# Patient Record
Sex: MALE | Race: WHITE | Employment: OTHER | URBAN - METROPOLITAN AREA
[De-identification: names, ages, dates, MRNs, and addresses within clinical notes are randomized per-mention and may not be internally consistent; named-entity substitution may affect disease eponyms.]

---

## 2023-02-24 ENCOUNTER — APPOINTMENT (OUTPATIENT)
Dept: GENERAL RADIOLOGY | Age: 83
DRG: 057 | End: 2023-02-24
Payer: MEDICARE

## 2023-02-24 ENCOUNTER — HOSPITAL ENCOUNTER (INPATIENT)
Age: 83
LOS: 1 days | Discharge: HOME | DRG: 057 | End: 2023-02-25
Attending: STUDENT IN AN ORGANIZED HEALTH CARE EDUCATION/TRAINING PROGRAM | Admitting: PSYCHIATRY & NEUROLOGY
Payer: MEDICARE

## 2023-02-24 ENCOUNTER — APPOINTMENT (OUTPATIENT)
Dept: CT IMAGING | Age: 83
DRG: 057 | End: 2023-02-24
Payer: MEDICARE

## 2023-02-24 DIAGNOSIS — F02.818 ALZHEIMER'S DEMENTIA WITH OTHER BEHAVIORAL DISTURBANCE, UNSPECIFIED DEMENTIA SEVERITY, UNSPECIFIED TIMING OF DEMENTIA ONSET (HCC): Primary | ICD-10-CM

## 2023-02-24 DIAGNOSIS — G30.9 ALZHEIMER'S DEMENTIA WITH OTHER BEHAVIORAL DISTURBANCE, UNSPECIFIED DEMENTIA SEVERITY, UNSPECIFIED TIMING OF DEMENTIA ONSET (HCC): Primary | ICD-10-CM

## 2023-02-24 LAB
A/G RATIO: 1.6 (ref 1.1–2.2)
ACETAMINOPHEN LEVEL: <5 UG/ML (ref 10–30)
ALBUMIN SERPL-MCNC: 3.9 G/DL (ref 3.4–5)
ALP BLD-CCNC: 69 U/L (ref 40–129)
ALT SERPL-CCNC: 19 U/L (ref 10–40)
ANION GAP SERPL CALCULATED.3IONS-SCNC: 8 MMOL/L (ref 3–16)
AST SERPL-CCNC: 21 U/L (ref 15–37)
BACTERIA: ABNORMAL /HPF
BASOPHILS ABSOLUTE: 0 K/UL (ref 0–0.2)
BASOPHILS RELATIVE PERCENT: 0.4 %
BILIRUB SERPL-MCNC: 0.4 MG/DL (ref 0–1)
BILIRUBIN URINE: NEGATIVE
BLOOD, URINE: ABNORMAL
BUN BLDV-MCNC: 19 MG/DL (ref 7–20)
CALCIUM SERPL-MCNC: 9.1 MG/DL (ref 8.3–10.6)
CHLORIDE BLD-SCNC: 103 MMOL/L (ref 99–110)
CLARITY: CLEAR
CO2: 30 MMOL/L (ref 21–32)
COLOR: YELLOW
CREAT SERPL-MCNC: 1 MG/DL (ref 0.8–1.3)
EOSINOPHILS ABSOLUTE: 0 K/UL (ref 0–0.6)
EOSINOPHILS RELATIVE PERCENT: 0.7 %
ETHANOL: NORMAL MG/DL (ref 0–0.08)
GFR SERPL CREATININE-BSD FRML MDRD: >60 ML/MIN/{1.73_M2}
GLUCOSE BLD-MCNC: 192 MG/DL (ref 70–99)
GLUCOSE BLD-MCNC: 62 MG/DL (ref 70–99)
GLUCOSE URINE: NEGATIVE MG/DL
HCT VFR BLD CALC: 39.3 % (ref 40.5–52.5)
HEMOGLOBIN: 13.3 G/DL (ref 13.5–17.5)
INFLUENZA A: NOT DETECTED
INFLUENZA B: NOT DETECTED
KETONES, URINE: NEGATIVE MG/DL
LEUKOCYTE ESTERASE, URINE: NEGATIVE
LYMPHOCYTES ABSOLUTE: 1 K/UL (ref 1–5.1)
LYMPHOCYTES RELATIVE PERCENT: 18.7 %
MAGNESIUM: 2.1 MG/DL (ref 1.8–2.4)
MCH RBC QN AUTO: 30.7 PG (ref 26–34)
MCHC RBC AUTO-ENTMCNC: 33.9 G/DL (ref 31–36)
MCV RBC AUTO: 90.6 FL (ref 80–100)
MICROSCOPIC EXAMINATION: YES
MONOCYTES ABSOLUTE: 0.5 K/UL (ref 0–1.3)
MONOCYTES RELATIVE PERCENT: 9.2 %
NEUTROPHILS ABSOLUTE: 3.9 K/UL (ref 1.7–7.7)
NEUTROPHILS RELATIVE PERCENT: 71 %
NITRITE, URINE: NEGATIVE
PDW BLD-RTO: 14.3 % (ref 12.4–15.4)
PERFORMED ON: ABNORMAL
PH UA: 6 (ref 5–8)
PLATELET # BLD: 81 K/UL (ref 135–450)
PLATELET SLIDE REVIEW: ABNORMAL
PMV BLD AUTO: 7.1 FL (ref 5–10.5)
POTASSIUM REFLEX MAGNESIUM: 3.2 MMOL/L (ref 3.5–5.1)
PROTEIN UA: ABNORMAL MG/DL
RBC # BLD: 4.34 M/UL (ref 4.2–5.9)
RBC UA: ABNORMAL /HPF (ref 0–4)
SALICYLATE, SERUM: <0.3 MG/DL (ref 15–30)
SARS-COV-2 RNA, RT PCR: NOT DETECTED
SLIDE REVIEW: ABNORMAL
SODIUM BLD-SCNC: 141 MMOL/L (ref 136–145)
SPECIFIC GRAVITY UA: 1.01 (ref 1–1.03)
TOTAL PROTEIN: 6.4 G/DL (ref 6.4–8.2)
TROPONIN: 0.11 NG/ML
TROPONIN: 0.13 NG/ML
URINE TYPE: ABNORMAL
UROBILINOGEN, URINE: 0.2 E.U./DL
WBC # BLD: 5.5 K/UL (ref 4–11)
WBC UA: ABNORMAL /HPF (ref 0–5)

## 2023-02-24 PROCEDURE — 2580000003 HC RX 258: Performed by: STUDENT IN AN ORGANIZED HEALTH CARE EDUCATION/TRAINING PROGRAM

## 2023-02-24 PROCEDURE — 93005 ELECTROCARDIOGRAM TRACING: CPT | Performed by: STUDENT IN AN ORGANIZED HEALTH CARE EDUCATION/TRAINING PROGRAM

## 2023-02-24 PROCEDURE — 85025 COMPLETE CBC W/AUTO DIFF WBC: CPT

## 2023-02-24 PROCEDURE — 80143 DRUG ASSAY ACETAMINOPHEN: CPT

## 2023-02-24 PROCEDURE — 80053 COMPREHEN METABOLIC PANEL: CPT

## 2023-02-24 PROCEDURE — 2580000003 HC RX 258

## 2023-02-24 PROCEDURE — 84443 ASSAY THYROID STIM HORMONE: CPT

## 2023-02-24 PROCEDURE — 36415 COLL VENOUS BLD VENIPUNCTURE: CPT

## 2023-02-24 PROCEDURE — 84484 ASSAY OF TROPONIN QUANT: CPT

## 2023-02-24 PROCEDURE — 82077 ASSAY SPEC XCP UR&BREATH IA: CPT

## 2023-02-24 PROCEDURE — 99285 EMERGENCY DEPT VISIT HI MDM: CPT

## 2023-02-24 PROCEDURE — 6360000002 HC RX W HCPCS: Performed by: STUDENT IN AN ORGANIZED HEALTH CARE EDUCATION/TRAINING PROGRAM

## 2023-02-24 PROCEDURE — 83735 ASSAY OF MAGNESIUM: CPT

## 2023-02-24 PROCEDURE — 81001 URINALYSIS AUTO W/SCOPE: CPT

## 2023-02-24 PROCEDURE — 80179 DRUG ASSAY SALICYLATE: CPT

## 2023-02-24 PROCEDURE — 71045 X-RAY EXAM CHEST 1 VIEW: CPT

## 2023-02-24 PROCEDURE — 87636 SARSCOV2 & INF A&B AMP PRB: CPT

## 2023-02-24 PROCEDURE — 96372 THER/PROPH/DIAG INJ SC/IM: CPT

## 2023-02-24 PROCEDURE — 70450 CT HEAD/BRAIN W/O DYE: CPT

## 2023-02-24 RX ORDER — LANOLIN ALCOHOL/MO/W.PET/CERES
CREAM (GRAM) TOPICAL
Status: ON HOLD | COMMUNITY
End: 2023-02-25

## 2023-02-24 RX ORDER — AMLODIPINE BESYLATE 2.5 MG/1
TABLET ORAL
Status: ON HOLD | COMMUNITY
End: 2023-03-02 | Stop reason: SDUPTHER

## 2023-02-24 RX ORDER — ASPIRIN 81 MG/1
TABLET ORAL
Status: ON HOLD | COMMUNITY
End: 2023-02-25

## 2023-02-24 RX ORDER — GLIMEPIRIDE 1 MG/1
TABLET ORAL
Status: ON HOLD | COMMUNITY
Start: 2023-01-06 | End: 2023-03-08 | Stop reason: HOSPADM

## 2023-02-24 RX ORDER — ZIPRASIDONE MESYLATE 20 MG/ML
20 INJECTION, POWDER, LYOPHILIZED, FOR SOLUTION INTRAMUSCULAR ONCE
Status: COMPLETED | OUTPATIENT
Start: 2023-02-24 | End: 2023-02-24

## 2023-02-24 RX ORDER — LORAZEPAM 2 MG/ML
2 INJECTION INTRAMUSCULAR ONCE
Status: COMPLETED | OUTPATIENT
Start: 2023-02-24 | End: 2023-02-24

## 2023-02-24 RX ORDER — DIVALPROEX SODIUM 500 MG/1
TABLET, EXTENDED RELEASE ORAL
Status: ON HOLD | COMMUNITY
End: 2023-03-02 | Stop reason: HOSPADM

## 2023-02-24 RX ORDER — LOSARTAN POTASSIUM 100 MG/1
TABLET ORAL
Status: ON HOLD | COMMUNITY
End: 2023-03-02 | Stop reason: SDUPTHER

## 2023-02-24 RX ORDER — ASCORBIC ACID 1000 MG
120 TABLET ORAL
Status: ON HOLD | COMMUNITY
End: 2023-02-25

## 2023-02-24 RX ORDER — VITAMIN E 268 MG
CAPSULE ORAL
Status: ON HOLD | COMMUNITY
End: 2023-02-25

## 2023-02-24 RX ORDER — WATER 1000 ML/1000ML
INJECTION, SOLUTION INTRAVENOUS
Status: COMPLETED
Start: 2023-02-24 | End: 2023-02-24

## 2023-02-24 RX ORDER — DONEPEZIL HYDROCHLORIDE 10 MG/1
TABLET, FILM COATED ORAL
Status: ON HOLD | COMMUNITY
End: 2023-03-02 | Stop reason: HOSPADM

## 2023-02-24 RX ORDER — ATORVASTATIN CALCIUM 40 MG/1
TABLET, FILM COATED ORAL
Status: ON HOLD | COMMUNITY
End: 2023-03-08 | Stop reason: HOSPADM

## 2023-02-24 RX ORDER — DEXTROSE MONOHYDRATE 25 G/50ML
25 INJECTION, SOLUTION INTRAVENOUS ONCE
Status: DISCONTINUED | OUTPATIENT
Start: 2023-02-24 | End: 2023-02-24 | Stop reason: CLARIF

## 2023-02-24 RX ADMIN — WATER 10 ML: 1 INJECTION INTRAMUSCULAR; INTRAVENOUS; SUBCUTANEOUS at 18:45

## 2023-02-24 RX ADMIN — ZIPRASIDONE MESYLATE 20 MG: 20 INJECTION, POWDER, LYOPHILIZED, FOR SOLUTION INTRAMUSCULAR at 18:44

## 2023-02-24 RX ADMIN — LORAZEPAM 2 MG: 2 INJECTION INTRAMUSCULAR; INTRAVENOUS at 18:44

## 2023-02-24 RX ADMIN — DEXTROSE MONOHYDRATE 250 ML: 100 INJECTION, SOLUTION INTRAVENOUS at 20:41

## 2023-02-24 NOTE — ED PROVIDER NOTES
[address] ED     EMERGENCY DEPARTMENT ENCOUNTER            Pt Name: Halina Dodge   MRN: [id number]   [place] 1940   Date of evaluation: 2/24/2023   Provider: Abby Guzman MD   PCP: Vicente Renee   Note Started: 5:31 PM EST 2/24/23          CHIEF COMPLAINT     Chief Complaint   Patient presents with    Altered Mental Status     Pt from home with a history of Alzheimer's. Per EMS, pt has been wandering from home for miles in the last few days and needing to be searched out. HISTORY OF PRESENT ILLNESS:   History from : EMS   Limitations to history : Altered Mental Status     Halina Dodge is a [de-identified] y.o. male who presents with concerns for altered mental status. Patient presents from home, does have a history of Alzheimer's. Apparently has been leaving the house the past few days. Upon arrival in the ED, patient is unable to contribute to the history and and is oriented only to self. Per EMS, patient's family members are hoping to have them placed. Nursing Notes were all reviewed and agreed with, or any disagreements were addressed in the HPI. REVIEW OF SYSTEMS :    Positives and Pertinent negatives as per HPI. MEDICAL HISTORY   has no past medical history on file. No past surgical history on file. CURRENTMEDICATIONS       Previous Medications    AMLODIPINE (NORVASC) 2.5 MG TABLET    amlodipine 2.5 mg tablet   TAKE 1 TABLET BY MOUTH EVERY DAY AT BEDTIME    ASPIRIN 81 MG EC TABLET    aspirin 81 mg tablet,delayed release   Take 1 tablet every day by oral route.     ATORVASTATIN (LIPITOR) 40 MG TABLET    atorvastatin 40 mg tablet   TAKE 1 TABLET BY MOUTH EVERY DAY AT BEDTIME    DIVALPROEX (DEPAKOTE ER) 500 MG EXTENDED RELEASE TABLET    divalproex  mg tablet,extended release 24 hr   TAKE 1 TABLET BY MOUTH ONCE DAILY FOR 2 WEEKS THEN INCREASE TO 1 TABLET TWICE DAILY    DONEPEZIL (ARICEPT) 10 MG TABLET    donepezil 10 mg tablet    GINKGO BILOBA 40 MG TABS    Take 120 mg by mouth    GLIMEPIRIDE (AMARYL) 1 MG TABLET    TAKE 1 TABLET BY MOUTH EVERY DAY    LOSARTAN (COZAAR) 100 MG TABLET    losartan 100 mg tablet   TAKE 1 TABLET BY MOUTH DAILY    MELATONIN 3 MG TABS TABLET    melatonin 3 mg tablet   Take 4 tablets every day by oral route at bedtime. MULTIPLE VITAMINS-MINERALS (CENTRUM SILVER 50+MEN PO)    Take by mouth    VITAMIN B-12 (CYANOCOBALAMIN) 1000 MCG TABLET    Vitamin B12   1 po daily    VITAMIN E 180 MG (400 UNIT) CAPS CAPSULE    vitamin E   1 po daily      SCREENINGS          Ragan Coma Scale  Eye Opening: Spontaneous  Best Verbal Response: Confused  Best Motor Response: Obeys commands  Ragan Coma Scale Score: 14                CIWA Assessment  BP: 122/65  Heart Rate: 66                  PHYSICAL EXAM :  ED Triage Vitals   BP Temp Temp src Pulse Resp SpO2 Height Weight   -- -- -- -- -- -- -- --      GENERAL APPEARANCE: Awake and alert. Cooperative. No acute distress. HEAD: Normocephalic. Atraumatic. EYES: PERRL. EOM's grossly intact. ENT: Mucous membranes are moist.   NECK: Supple, trachea midline. HEART: RRR. Normal S1, S2. No murmurs, rubs or gallops. LUNGS: Respirations unlabored. CTAB. Good air exchange. No wheezes, rales, or rhonchi. ABDOMEN: Soft. Non-distended. Non-tender. No guarding or rebound. Normal Bowel sounds. EXTREMITIES: No peripheral edema. MAEE. No acute deformities. SKIN: Warm and dry. No acute rashes. NEUROLOGICAL: Alert and oriented X 1. Doing all extremities spontaneously and attempting to get out of bed.   PSYCHIATRIC: Demented    DIAGNOSTIC RESULTS     LABS:   Labs Reviewed   CBC WITH AUTO DIFFERENTIAL - Abnormal; Notable for the following components:       Result Value    Hemoglobin 13.3 (*)     Hematocrit 39.3 (*)     Platelets 81 (*)     All other components within normal limits   COMPREHENSIVE METABOLIC PANEL W/ REFLEX TO MG FOR LOW K - Abnormal; Notable for the following components:    Potassium reflex Magnesium 3.2 (*)     Glucose 62 (*)     All other components within normal limits   TROPONIN - Abnormal; Notable for the following components:    Troponin 0.13 (*)     All other components within normal limits   URINALYSIS - Abnormal; Notable for the following components:    Blood, Urine TRACE-INTACT (*)     Protein, UA TRACE (*)     All other components within normal limits   ACETAMINOPHEN LEVEL - Abnormal; Notable for the following components:    Acetaminophen Level <5 (*)     All other components within normal limits   SALICYLATE LEVEL - Abnormal; Notable for the following components:    Salicylate, Serum <9.3 (*)     All other components within normal limits   MICROSCOPIC URINALYSIS - Abnormal; Notable for the following components:    Bacteria, UA Rare (*)     All other components within normal limits   TROPONIN - Abnormal; Notable for the following components:    Troponin 0.11 (*)     All other components within normal limits   POCT GLUCOSE - Abnormal; Notable for the following components:    POC Glucose 192 (*)     All other components within normal limits   COVID-19 & INFLUENZA COMBO   ETHANOL   MAGNESIUM      When ordered only abnormal lab results are displayed. All other labs were within normal range or not returned as of this dictation. RADIOLOGY:      Non-plain film images such as CT, Ultrasound and MRI are read by the radiologist. Plain radiographic images are visualized and preliminarily interpreted by the ED Provider with the below findings:   Interpretation per the Radiologist below, if available at the time of this note:     CT HEAD WO CONTRAST   Final Result   Chronic findings in the brain without acute CT abnormality identified. XR CHEST PORTABLE   Final Result   Mild bibasilar discoid atelectasis or scarring with no acute infiltrate or   effusion            No results found. EKG:   The Ekg interpreted by me shows  normal sinus rhythm with a rate of 91  Axis is   Normal  QTc is  within an acceptable range  Intervals and Durations are unremarkable. ST Segments: nonspecific changes  No previous available for comparison    PROCEDURES   Unless otherwise noted below, none     CRITICAL CARE TIME   0         Vitals:    Vitals:    02/24/23 1804 02/24/23 1929 02/24/23 2002 02/24/23 2202   BP: (!) 166/83 (!) 129/97 127/67 122/65   Pulse: 70 96 76 66   Resp: 18 13 20 20   Temp: 97.1 °F (36.2 °C)      TempSrc: Oral      SpO2: 100% 100% 100% 100%             Is this patient to be included in the SEP-1 Core Measure due to severe sepsis or septic shock? No   Exclusion criteria - the patient is NOT to be included for SEP-1 Core Measure due to: Infection is not suspected       CC/HPI Summary, DDx, ED Course, and Reassessment: Patient is an 82-year-old male, with history of Alzheimer's, presenting with home care with concerns for worsening altered mental status. He is alert and oriented only to self. He is moving all extremities spontaneously. Upon arrival in the ED, vitals are reassuring. Patient is resting comfortably and is in no acute distress but was attempting to get out of bed and for this reason was placed in a Posey belt for his own safety. He became increasingly more agitated and was treated with Geodon and IM Ativan with improvement of his agitation. Labs were performed and are overall reassuring. No acute concerning electrolyte abnormalities. His troponin was initially elevated at 0.13. I have no previous for comparison and repeat was found to be downtrending at 0.11. They with no evidence of infection. CT head negative for acute concerning findings. Patient is medically cleared for behavioral health assessment. Will be signed out to the oncoming physician pending their evaluation and recommendations.      Patient was given the following medications:   Medications   dextrose bolus 10% 125 mL ( IntraVENous See Alternative 2/24/23 2041)     Or   dextrose bolus 10% 250 mL (250 mLs IntraVENous New Bag 2/24/23 2041)   dextrose bolus 10% 250 mL ( IntraVENous Canceled Entry 2/24/23 2225)   ziprasidone (GEODON) injection 20 mg (20 mg IntraMUSCular Given 2/24/23 1844)   LORazepam (ATIVAN) injection 2 mg (2 mg IntraMUSCular Given 2/24/23 1844)   sterile water injection (10 mLs  Given 2/24/23 1845)        CONSULTS:   IP CONSULT TO PSYCHIATRY   Discussion with Other Professionals: None   {Social Determinants: None  Chronic Conditions: Alzheimer's dementia  Records Reviewed: NA      Disposition Considerations: Will be signed out to oncoming physician pending behavioral health evaluation and recommendations. I am the Primary Clinician of Record. FINAL IMPRESSION    1. Alzheimer's dementia with other behavioral disturbance, unspecified dementia severity, unspecified timing of dementia onset ([facility] [address]           DISPOSITION/PLAN     PATIENT REFERRED TO:   No follow-up provider specified.      DISCHARGE MEDICATIONS:   New Prescriptions    No medications on file        DISCONTINUED MEDICATIONS:   Discontinued Medications    No medications on file              (Please note that portions of this note were completed with a voice recognition program.  Efforts were made to edit the dictations but occasionally words are mis-transcribed.)       Hermann Geronimo MD (electronically signed)            Hermann Geronimo MD  02/24/23 3888
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
## 2023-02-24 NOTE — ED NOTES
Pt uncooperative not allowing staff to touch him; could not obtain EKG or blood work at this time; provider notified           Elijah Diane RN  02/24/23 6012

## 2023-02-25 ENCOUNTER — APPOINTMENT (OUTPATIENT)
Dept: GENERAL RADIOLOGY | Age: 83
End: 2023-02-25
Attending: INTERNAL MEDICINE
Payer: MEDICARE

## 2023-02-25 ENCOUNTER — HOSPITAL ENCOUNTER (INPATIENT)
Age: 83
LOS: 6 days | Discharge: NURSING, REHAB OR LONG-TERM CARE | End: 2023-03-03
Attending: INTERNAL MEDICINE | Admitting: INTERNAL MEDICINE
Payer: MEDICARE

## 2023-02-25 ENCOUNTER — APPOINTMENT (OUTPATIENT)
Dept: CT IMAGING | Age: 83
DRG: 057 | End: 2023-02-25
Payer: MEDICARE

## 2023-02-25 VITALS
RESPIRATION RATE: 18 BRPM | SYSTOLIC BLOOD PRESSURE: 132 MMHG | OXYGEN SATURATION: 97 % | HEART RATE: 90 BPM | TEMPERATURE: 98.7 F | DIASTOLIC BLOOD PRESSURE: 80 MMHG

## 2023-02-25 PROBLEM — I10 PRIMARY HYPERTENSION: Status: ACTIVE | Noted: 2023-02-25

## 2023-02-25 PROBLEM — F03.918 DEMENTIA WITH BEHAVIORAL DISTURBANCE: Status: ACTIVE | Noted: 2023-02-25

## 2023-02-25 PROBLEM — R41.82 ALTERED MENTAL STATUS: Status: ACTIVE | Noted: 2023-02-25

## 2023-02-25 PROBLEM — J96.01 ACUTE RESPIRATORY FAILURE WITH HYPOXIA (HCC): Status: ACTIVE | Noted: 2023-02-25

## 2023-02-25 PROBLEM — F02.80 ALZHEIMER'S DEMENTIA (HCC): Status: ACTIVE | Noted: 2023-02-25

## 2023-02-25 PROBLEM — T17.908A ASPIRATION INTO AIRWAY: Status: ACTIVE | Noted: 2023-02-25

## 2023-02-25 PROBLEM — E11.9 TYPE 2 DIABETES MELLITUS WITHOUT COMPLICATION, WITHOUT LONG-TERM CURRENT USE OF INSULIN (HCC): Status: ACTIVE | Noted: 2023-02-25

## 2023-02-25 PROBLEM — G30.9 ALZHEIMER'S DEMENTIA (HCC): Status: ACTIVE | Noted: 2023-02-25

## 2023-02-25 LAB
ANION GAP SERPL CALCULATED.3IONS-SCNC: 14 MMOL/L (ref 3–16)
BASE EXCESS ARTERIAL: 3.7 MMOL/L (ref -3–3)
BASOPHILS ABSOLUTE: 0 K/UL (ref 0–0.2)
BASOPHILS RELATIVE PERCENT: 0.1 %
BUN BLDV-MCNC: 23 MG/DL (ref 7–20)
CALCIUM SERPL-MCNC: 9.1 MG/DL (ref 8.3–10.6)
CARBOXYHEMOGLOBIN ARTERIAL: 1 % (ref 0–1.5)
CHLORIDE BLD-SCNC: 103 MMOL/L (ref 99–110)
CO2: 26 MMOL/L (ref 21–32)
CREAT SERPL-MCNC: 1.4 MG/DL (ref 0.8–1.3)
EKG ATRIAL RATE: 91 BPM
EKG DIAGNOSIS: NORMAL
EKG P AXIS: 62 DEGREES
EKG P-R INTERVAL: 138 MS
EKG Q-T INTERVAL: 370 MS
EKG QRS DURATION: 82 MS
EKG QTC CALCULATION (BAZETT): 455 MS
EKG R AXIS: 46 DEGREES
EKG T AXIS: -28 DEGREES
EKG VENTRICULAR RATE: 91 BPM
EOSINOPHILS ABSOLUTE: 0 K/UL (ref 0–0.6)
EOSINOPHILS RELATIVE PERCENT: 0 %
GFR SERPL CREATININE-BSD FRML MDRD: 50 ML/MIN/{1.73_M2}
GLUCOSE BLD-MCNC: 111 MG/DL (ref 70–99)
GLUCOSE BLD-MCNC: 130 MG/DL (ref 70–99)
HCO3 ARTERIAL: 29.3 MMOL/L (ref 21–29)
HCT VFR BLD CALC: 47.1 % (ref 40.5–52.5)
HEMOGLOBIN, ART, EXTENDED: 16.3 G/DL (ref 13.5–17.5)
HEMOGLOBIN: 15.5 G/DL (ref 13.5–17.5)
LACTIC ACID: 2.6 MMOL/L (ref 0.4–2)
LYMPHOCYTES ABSOLUTE: 0.4 K/UL (ref 1–5.1)
LYMPHOCYTES RELATIVE PERCENT: 3.4 %
MCH RBC QN AUTO: 30.8 PG (ref 26–34)
MCHC RBC AUTO-ENTMCNC: 32.9 G/DL (ref 31–36)
MCV RBC AUTO: 93.7 FL (ref 80–100)
METHEMOGLOBIN ARTERIAL: 0 %
MONOCYTES ABSOLUTE: 1.3 K/UL (ref 0–1.3)
MONOCYTES RELATIVE PERCENT: 10.6 %
NEUTROPHILS ABSOLUTE: 10.7 K/UL (ref 1.7–7.7)
NEUTROPHILS RELATIVE PERCENT: 85.9 %
O2 SAT, ARTERIAL: 54.8 %
O2 THERAPY: ABNORMAL
PCO2 ARTERIAL: 47.5 MMHG (ref 35–45)
PDW BLD-RTO: 14.8 % (ref 12.4–15.4)
PERFORMED ON: ABNORMAL
PH ARTERIAL: 7.41 (ref 7.35–7.45)
PLATELET # BLD: 80 K/UL (ref 135–450)
PMV BLD AUTO: 7.5 FL (ref 5–10.5)
PO2 ARTERIAL: 29 MMHG (ref 75–108)
POTASSIUM REFLEX MAGNESIUM: 3.8 MMOL/L (ref 3.5–5.1)
PROCALCITONIN: 1.58 NG/ML (ref 0–0.15)
RBC # BLD: 5.02 M/UL (ref 4.2–5.9)
SODIUM BLD-SCNC: 143 MMOL/L (ref 136–145)
TCO2 ARTERIAL: 30.8 MMOL/L
TROPONIN: 0.09 NG/ML
TSH SERPL DL<=0.05 MIU/L-ACNC: 3.38 UIU/ML (ref 0.27–4.2)
WBC # BLD: 12.5 K/UL (ref 4–11)

## 2023-02-25 PROCEDURE — 99223 1ST HOSP IP/OBS HIGH 75: CPT | Performed by: PSYCHIATRY & NEUROLOGY

## 2023-02-25 PROCEDURE — 6360000004 HC RX CONTRAST MEDICATION

## 2023-02-25 PROCEDURE — 2060000000 HC ICU INTERMEDIATE R&B

## 2023-02-25 PROCEDURE — 71045 X-RAY EXAM CHEST 1 VIEW: CPT

## 2023-02-25 PROCEDURE — 93010 ELECTROCARDIOGRAM REPORT: CPT | Performed by: INTERNAL MEDICINE

## 2023-02-25 PROCEDURE — 85025 COMPLETE CBC W/AUTO DIFF WBC: CPT

## 2023-02-25 PROCEDURE — 82803 BLOOD GASES ANY COMBINATION: CPT

## 2023-02-25 PROCEDURE — 80048 BASIC METABOLIC PNL TOTAL CA: CPT

## 2023-02-25 PROCEDURE — 6360000002 HC RX W HCPCS

## 2023-02-25 PROCEDURE — 1240000000 HC EMOTIONAL WELLNESS R&B

## 2023-02-25 PROCEDURE — 93005 ELECTROCARDIOGRAM TRACING: CPT

## 2023-02-25 PROCEDURE — 94761 N-INVAS EAR/PLS OXIMETRY MLT: CPT

## 2023-02-25 PROCEDURE — 36415 COLL VENOUS BLD VENIPUNCTURE: CPT

## 2023-02-25 PROCEDURE — 2700000000 HC OXYGEN THERAPY PER DAY

## 2023-02-25 PROCEDURE — 71260 CT THORAX DX C+: CPT

## 2023-02-25 PROCEDURE — 31720 CLEARANCE OF AIRWAYS: CPT

## 2023-02-25 PROCEDURE — 83036 HEMOGLOBIN GLYCOSYLATED A1C: CPT

## 2023-02-25 PROCEDURE — 84145 PROCALCITONIN (PCT): CPT

## 2023-02-25 PROCEDURE — 99223 1ST HOSP IP/OBS HIGH 75: CPT

## 2023-02-25 PROCEDURE — 83605 ASSAY OF LACTIC ACID: CPT

## 2023-02-25 PROCEDURE — 84484 ASSAY OF TROPONIN QUANT: CPT

## 2023-02-25 PROCEDURE — 2580000003 HC RX 258

## 2023-02-25 RX ORDER — SODIUM CHLORIDE 0.9 % (FLUSH) 0.9 %
5-40 SYRINGE (ML) INJECTION EVERY 12 HOURS SCHEDULED
Status: CANCELLED | OUTPATIENT
Start: 2023-02-25

## 2023-02-25 RX ORDER — BENZTROPINE MESYLATE 1 MG/ML
2 INJECTION INTRAMUSCULAR; INTRAVENOUS 2 TIMES DAILY PRN
Status: CANCELLED | OUTPATIENT
Start: 2023-02-25

## 2023-02-25 RX ORDER — AMLODIPINE BESYLATE 2.5 MG/1
2.5 TABLET ORAL NIGHTLY
Status: DISCONTINUED | OUTPATIENT
Start: 2023-02-25 | End: 2023-02-25 | Stop reason: HOSPADM

## 2023-02-25 RX ORDER — OLANZAPINE 5 MG/1
5 TABLET ORAL EVERY 8 HOURS PRN
Status: CANCELLED | OUTPATIENT
Start: 2023-02-25

## 2023-02-25 RX ORDER — POTASSIUM CHLORIDE 20 MEQ/1
40 TABLET, EXTENDED RELEASE ORAL PRN
Status: DISCONTINUED | OUTPATIENT
Start: 2023-02-25 | End: 2023-03-03 | Stop reason: HOSPADM

## 2023-02-25 RX ORDER — POTASSIUM CHLORIDE 7.45 MG/ML
10 INJECTION INTRAVENOUS PRN
Status: CANCELLED | OUTPATIENT
Start: 2023-02-25

## 2023-02-25 RX ORDER — DONEPEZIL HYDROCHLORIDE 5 MG/1
10 TABLET, FILM COATED ORAL NIGHTLY
Status: DISCONTINUED | OUTPATIENT
Start: 2023-02-25 | End: 2023-03-03 | Stop reason: HOSPADM

## 2023-02-25 RX ORDER — MAGNESIUM HYDROXIDE/ALUMINUM HYDROXICE/SIMETHICONE 120; 1200; 1200 MG/30ML; MG/30ML; MG/30ML
30 SUSPENSION ORAL EVERY 6 HOURS PRN
Status: DISCONTINUED | OUTPATIENT
Start: 2023-02-25 | End: 2023-03-03 | Stop reason: HOSPADM

## 2023-02-25 RX ORDER — ONDANSETRON 4 MG/1
4 TABLET, ORALLY DISINTEGRATING ORAL EVERY 8 HOURS PRN
Status: CANCELLED | OUTPATIENT
Start: 2023-02-25

## 2023-02-25 RX ORDER — LOSARTAN POTASSIUM 100 MG/1
100 TABLET ORAL DAILY
Status: DISCONTINUED | OUTPATIENT
Start: 2023-02-26 | End: 2023-03-03 | Stop reason: HOSPADM

## 2023-02-25 RX ORDER — ACETAMINOPHEN 325 MG/1
650 TABLET ORAL EVERY 6 HOURS PRN
Status: CANCELLED | OUTPATIENT
Start: 2023-02-25

## 2023-02-25 RX ORDER — M-VIT,TX,IRON,MINS/CALC/FOLIC 27MG-0.4MG
1 TABLET ORAL
Status: DISCONTINUED | OUTPATIENT
Start: 2023-02-25 | End: 2023-02-25 | Stop reason: HOSPADM

## 2023-02-25 RX ORDER — DIVALPROEX SODIUM 250 MG/1
250 TABLET, EXTENDED RELEASE ORAL 2 TIMES DAILY
Status: CANCELLED | OUTPATIENT
Start: 2023-02-25

## 2023-02-25 RX ORDER — ENOXAPARIN SODIUM 100 MG/ML
40 INJECTION SUBCUTANEOUS DAILY
Status: CANCELLED | OUTPATIENT
Start: 2023-02-25

## 2023-02-25 RX ORDER — ASPIRIN 81 MG/1
81 TABLET ORAL DAILY
Status: DISCONTINUED | OUTPATIENT
Start: 2023-02-26 | End: 2023-03-03 | Stop reason: HOSPADM

## 2023-02-25 RX ORDER — SODIUM CHLORIDE 9 MG/ML
INJECTION, SOLUTION INTRAVENOUS PRN
Status: CANCELLED | OUTPATIENT
Start: 2023-02-25

## 2023-02-25 RX ORDER — LABETALOL HYDROCHLORIDE 5 MG/ML
10 INJECTION, SOLUTION INTRAVENOUS EVERY 6 HOURS PRN
Status: DISCONTINUED | OUTPATIENT
Start: 2023-02-25 | End: 2023-03-03 | Stop reason: HOSPADM

## 2023-02-25 RX ORDER — BENZTROPINE MESYLATE 1 MG/ML
2 INJECTION INTRAMUSCULAR; INTRAVENOUS 2 TIMES DAILY PRN
Status: DISCONTINUED | OUTPATIENT
Start: 2023-02-25 | End: 2023-03-03 | Stop reason: HOSPADM

## 2023-02-25 RX ORDER — SODIUM CHLORIDE 9 MG/ML
INJECTION, SOLUTION INTRAVENOUS CONTINUOUS
Status: DISCONTINUED | OUTPATIENT
Start: 2023-02-25 | End: 2023-02-26

## 2023-02-25 RX ORDER — NICOTINE 21 MG/24HR
1 PATCH, TRANSDERMAL 24 HOURS TRANSDERMAL DAILY
Status: DISCONTINUED | OUTPATIENT
Start: 2023-02-25 | End: 2023-02-25 | Stop reason: HOSPADM

## 2023-02-25 RX ORDER — ATORVASTATIN CALCIUM 40 MG/1
40 TABLET, FILM COATED ORAL NIGHTLY
Status: DISCONTINUED | OUTPATIENT
Start: 2023-02-25 | End: 2023-03-03 | Stop reason: HOSPADM

## 2023-02-25 RX ORDER — AMLODIPINE BESYLATE 2.5 MG/1
2.5 TABLET ORAL NIGHTLY
Status: DISCONTINUED | OUTPATIENT
Start: 2023-02-25 | End: 2023-03-03 | Stop reason: HOSPADM

## 2023-02-25 RX ORDER — VITAMIN E 268 MG
400 CAPSULE ORAL DAILY
Status: DISCONTINUED | OUTPATIENT
Start: 2023-02-25 | End: 2023-02-25 | Stop reason: HOSPADM

## 2023-02-25 RX ORDER — ASPIRIN 81 MG/1
81 TABLET ORAL DAILY
Status: CANCELLED | OUTPATIENT
Start: 2023-02-26

## 2023-02-25 RX ORDER — POLYETHYLENE GLYCOL 3350 17 G/17G
17 POWDER, FOR SOLUTION ORAL DAILY PRN
Status: CANCELLED | OUTPATIENT
Start: 2023-02-25

## 2023-02-25 RX ORDER — ONDANSETRON 4 MG/1
4 TABLET, ORALLY DISINTEGRATING ORAL EVERY 8 HOURS PRN
Status: DISCONTINUED | OUTPATIENT
Start: 2023-02-25 | End: 2023-03-03 | Stop reason: HOSPADM

## 2023-02-25 RX ORDER — HYDROXYZINE 50 MG/1
50 TABLET, FILM COATED ORAL 3 TIMES DAILY PRN
Status: DISCONTINUED | OUTPATIENT
Start: 2023-02-25 | End: 2023-02-25 | Stop reason: HOSPADM

## 2023-02-25 RX ORDER — ATORVASTATIN CALCIUM 40 MG/1
40 TABLET, FILM COATED ORAL NIGHTLY
Status: DISCONTINUED | OUTPATIENT
Start: 2023-02-25 | End: 2023-02-25 | Stop reason: HOSPADM

## 2023-02-25 RX ORDER — GLIPIZIDE 5 MG/1
2.5 TABLET ORAL DAILY
Status: DISCONTINUED | OUTPATIENT
Start: 2023-02-26 | End: 2023-02-25

## 2023-02-25 RX ORDER — ONDANSETRON 2 MG/ML
4 INJECTION INTRAMUSCULAR; INTRAVENOUS EVERY 6 HOURS PRN
Status: DISCONTINUED | OUTPATIENT
Start: 2023-02-25 | End: 2023-03-03 | Stop reason: HOSPADM

## 2023-02-25 RX ORDER — POTASSIUM CHLORIDE 7.45 MG/ML
10 INJECTION INTRAVENOUS PRN
Status: DISCONTINUED | OUTPATIENT
Start: 2023-02-25 | End: 2023-03-03 | Stop reason: HOSPADM

## 2023-02-25 RX ORDER — DONEPEZIL HYDROCHLORIDE 5 MG/1
10 TABLET, FILM COATED ORAL NIGHTLY
Status: DISCONTINUED | OUTPATIENT
Start: 2023-02-25 | End: 2023-02-25 | Stop reason: HOSPADM

## 2023-02-25 RX ORDER — DEXTROSE MONOHYDRATE 100 MG/ML
INJECTION, SOLUTION INTRAVENOUS CONTINUOUS PRN
Status: DISCONTINUED | OUTPATIENT
Start: 2023-02-25 | End: 2023-03-03 | Stop reason: HOSPADM

## 2023-02-25 RX ORDER — POLYETHYLENE GLYCOL 3350 17 G/17G
17 POWDER, FOR SOLUTION ORAL DAILY PRN
Status: DISCONTINUED | OUTPATIENT
Start: 2023-02-25 | End: 2023-03-03 | Stop reason: HOSPADM

## 2023-02-25 RX ORDER — ASPIRIN 81 MG/1
81 TABLET ORAL DAILY
Status: DISCONTINUED | OUTPATIENT
Start: 2023-02-25 | End: 2023-02-25 | Stop reason: HOSPADM

## 2023-02-25 RX ORDER — CHOLECALCIFEROL (VITAMIN D3) 125 MCG
500 CAPSULE ORAL DAILY
Status: DISCONTINUED | OUTPATIENT
Start: 2023-02-25 | End: 2023-02-25 | Stop reason: HOSPADM

## 2023-02-25 RX ORDER — GLIPIZIDE 5 MG/1
2.5 TABLET ORAL DAILY
Status: CANCELLED | OUTPATIENT
Start: 2023-02-26

## 2023-02-25 RX ORDER — LANOLIN ALCOHOL/MO/W.PET/CERES
4.5 CREAM (GRAM) TOPICAL NIGHTLY PRN
Status: DISCONTINUED | OUTPATIENT
Start: 2023-02-25 | End: 2023-02-25 | Stop reason: HOSPADM

## 2023-02-25 RX ORDER — GLIPIZIDE 5 MG/1
2.5 TABLET ORAL DAILY
Status: DISCONTINUED | OUTPATIENT
Start: 2023-02-25 | End: 2023-02-25 | Stop reason: HOSPADM

## 2023-02-25 RX ORDER — BENZTROPINE MESYLATE 1 MG/ML
2 INJECTION INTRAMUSCULAR; INTRAVENOUS 2 TIMES DAILY PRN
Status: DISCONTINUED | OUTPATIENT
Start: 2023-02-25 | End: 2023-02-25 | Stop reason: HOSPADM

## 2023-02-25 RX ORDER — AMLODIPINE BESYLATE 2.5 MG/1
2.5 TABLET ORAL NIGHTLY
Status: CANCELLED | OUTPATIENT
Start: 2023-02-25

## 2023-02-25 RX ORDER — CHOLECALCIFEROL (VITAMIN D3) 125 MCG
500 CAPSULE ORAL DAILY
Status: CANCELLED | OUTPATIENT
Start: 2023-02-26

## 2023-02-25 RX ORDER — M-VIT,TX,IRON,MINS/CALC/FOLIC 27MG-0.4MG
1 TABLET ORAL
Status: CANCELLED | OUTPATIENT
Start: 2023-02-26

## 2023-02-25 RX ORDER — M-VIT,TX,IRON,MINS/CALC/FOLIC 27MG-0.4MG
1 TABLET ORAL
Status: DISCONTINUED | OUTPATIENT
Start: 2023-02-26 | End: 2023-03-03 | Stop reason: HOSPADM

## 2023-02-25 RX ORDER — SODIUM CHLORIDE 0.9 % (FLUSH) 0.9 %
5-40 SYRINGE (ML) INJECTION EVERY 12 HOURS SCHEDULED
Status: DISCONTINUED | OUTPATIENT
Start: 2023-02-25 | End: 2023-03-03 | Stop reason: HOSPADM

## 2023-02-25 RX ORDER — MAGNESIUM SULFATE 1 G/100ML
1000 INJECTION INTRAVENOUS PRN
Status: DISCONTINUED | OUTPATIENT
Start: 2023-02-25 | End: 2023-03-03 | Stop reason: HOSPADM

## 2023-02-25 RX ORDER — MAGNESIUM HYDROXIDE/ALUMINUM HYDROXICE/SIMETHICONE 120; 1200; 1200 MG/30ML; MG/30ML; MG/30ML
30 SUSPENSION ORAL EVERY 6 HOURS PRN
Status: DISCONTINUED | OUTPATIENT
Start: 2023-02-25 | End: 2023-02-25 | Stop reason: HOSPADM

## 2023-02-25 RX ORDER — SODIUM CHLORIDE 9 MG/ML
INJECTION, SOLUTION INTRAVENOUS PRN
Status: DISCONTINUED | OUTPATIENT
Start: 2023-02-25 | End: 2023-03-03 | Stop reason: HOSPADM

## 2023-02-25 RX ORDER — POLYETHYLENE GLYCOL 3350 17 G
2 POWDER IN PACKET (EA) ORAL
Status: DISCONTINUED | OUTPATIENT
Start: 2023-02-25 | End: 2023-02-25 | Stop reason: HOSPADM

## 2023-02-25 RX ORDER — ACETAMINOPHEN 650 MG/1
650 SUPPOSITORY RECTAL EVERY 6 HOURS PRN
Status: DISCONTINUED | OUTPATIENT
Start: 2023-02-25 | End: 2023-03-03 | Stop reason: HOSPADM

## 2023-02-25 RX ORDER — POTASSIUM CHLORIDE 20 MEQ/1
40 TABLET, EXTENDED RELEASE ORAL ONCE
Status: DISCONTINUED | OUTPATIENT
Start: 2023-02-25 | End: 2023-02-25 | Stop reason: HOSPADM

## 2023-02-25 RX ORDER — ACETAMINOPHEN 325 MG/1
650 TABLET ORAL EVERY 4 HOURS PRN
Status: DISCONTINUED | OUTPATIENT
Start: 2023-02-25 | End: 2023-02-25 | Stop reason: HOSPADM

## 2023-02-25 RX ORDER — LEVOFLOXACIN 5 MG/ML
750 INJECTION, SOLUTION INTRAVENOUS EVERY 24 HOURS
Status: DISCONTINUED | OUTPATIENT
Start: 2023-02-25 | End: 2023-02-25

## 2023-02-25 RX ORDER — CHOLECALCIFEROL (VITAMIN D3) 125 MCG
500 CAPSULE ORAL DAILY
Status: DISCONTINUED | OUTPATIENT
Start: 2023-02-26 | End: 2023-03-03 | Stop reason: HOSPADM

## 2023-02-25 RX ORDER — DIVALPROEX SODIUM 250 MG/1
250 TABLET, EXTENDED RELEASE ORAL 2 TIMES DAILY
Status: DISCONTINUED | OUTPATIENT
Start: 2023-02-25 | End: 2023-02-25 | Stop reason: HOSPADM

## 2023-02-25 RX ORDER — VITAMIN E 268 MG
400 CAPSULE ORAL DAILY
Status: DISCONTINUED | OUTPATIENT
Start: 2023-02-26 | End: 2023-03-03 | Stop reason: HOSPADM

## 2023-02-25 RX ORDER — POTASSIUM CHLORIDE 20 MEQ/1
40 TABLET, EXTENDED RELEASE ORAL PRN
Status: CANCELLED | OUTPATIENT
Start: 2023-02-25

## 2023-02-25 RX ORDER — OLANZAPINE 5 MG/1
5 TABLET ORAL EVERY 8 HOURS PRN
Status: DISCONTINUED | OUTPATIENT
Start: 2023-02-25 | End: 2023-02-25 | Stop reason: HOSPADM

## 2023-02-25 RX ORDER — OLANZAPINE 5 MG/1
5 TABLET ORAL EVERY 8 HOURS PRN
Status: DISCONTINUED | OUTPATIENT
Start: 2023-02-25 | End: 2023-03-03 | Stop reason: HOSPADM

## 2023-02-25 RX ORDER — VITAMIN E 268 MG
400 CAPSULE ORAL DAILY
Status: CANCELLED | OUTPATIENT
Start: 2023-02-26

## 2023-02-25 RX ORDER — LEVOFLOXACIN 5 MG/ML
750 INJECTION, SOLUTION INTRAVENOUS EVERY 24 HOURS
Status: DISCONTINUED | OUTPATIENT
Start: 2023-02-25 | End: 2023-02-25 | Stop reason: HOSPADM

## 2023-02-25 RX ORDER — ENOXAPARIN SODIUM 100 MG/ML
40 INJECTION SUBCUTANEOUS DAILY
Status: DISCONTINUED | OUTPATIENT
Start: 2023-02-25 | End: 2023-03-03 | Stop reason: HOSPADM

## 2023-02-25 RX ORDER — ACETAMINOPHEN 325 MG/1
650 TABLET ORAL EVERY 6 HOURS PRN
Status: DISCONTINUED | OUTPATIENT
Start: 2023-02-25 | End: 2023-03-03 | Stop reason: HOSPADM

## 2023-02-25 RX ORDER — MAGNESIUM SULFATE 1 G/100ML
1000 INJECTION INTRAVENOUS PRN
Status: CANCELLED | OUTPATIENT
Start: 2023-02-25

## 2023-02-25 RX ORDER — IBUPROFEN 400 MG/1
400 TABLET ORAL EVERY 6 HOURS PRN
Status: DISCONTINUED | OUTPATIENT
Start: 2023-02-25 | End: 2023-02-25 | Stop reason: HOSPADM

## 2023-02-25 RX ORDER — ACETAMINOPHEN 650 MG/1
650 SUPPOSITORY RECTAL EVERY 6 HOURS PRN
Status: CANCELLED | OUTPATIENT
Start: 2023-02-25

## 2023-02-25 RX ORDER — ATORVASTATIN CALCIUM 40 MG/1
40 TABLET, FILM COATED ORAL NIGHTLY
Status: CANCELLED | OUTPATIENT
Start: 2023-02-25

## 2023-02-25 RX ORDER — HYDROXYZINE 50 MG/1
50 TABLET, FILM COATED ORAL 3 TIMES DAILY PRN
Status: DISCONTINUED | OUTPATIENT
Start: 2023-02-25 | End: 2023-02-27

## 2023-02-25 RX ORDER — SODIUM CHLORIDE 0.9 % (FLUSH) 0.9 %
10 SYRINGE (ML) INJECTION PRN
Status: DISCONTINUED | OUTPATIENT
Start: 2023-02-25 | End: 2023-03-03 | Stop reason: HOSPADM

## 2023-02-25 RX ORDER — DIVALPROEX SODIUM 250 MG/1
250 TABLET, EXTENDED RELEASE ORAL 2 TIMES DAILY
Status: DISCONTINUED | OUTPATIENT
Start: 2023-02-25 | End: 2023-02-26

## 2023-02-25 RX ORDER — TRAZODONE HYDROCHLORIDE 50 MG/1
50 TABLET ORAL NIGHTLY PRN
Status: DISCONTINUED | OUTPATIENT
Start: 2023-02-25 | End: 2023-02-25 | Stop reason: HOSPADM

## 2023-02-25 RX ORDER — DONEPEZIL HYDROCHLORIDE 5 MG/1
10 TABLET, FILM COATED ORAL NIGHTLY
Status: CANCELLED | OUTPATIENT
Start: 2023-02-25

## 2023-02-25 RX ORDER — LOSARTAN POTASSIUM 100 MG/1
100 TABLET ORAL DAILY
Status: CANCELLED | OUTPATIENT
Start: 2023-02-26

## 2023-02-25 RX ORDER — OLANZAPINE 10 MG/1
10 TABLET ORAL EVERY 4 HOURS PRN
Status: DISCONTINUED | OUTPATIENT
Start: 2023-02-25 | End: 2023-02-25

## 2023-02-25 RX ORDER — MAGNESIUM HYDROXIDE/ALUMINUM HYDROXICE/SIMETHICONE 120; 1200; 1200 MG/30ML; MG/30ML; MG/30ML
30 SUSPENSION ORAL EVERY 6 HOURS PRN
Status: CANCELLED | OUTPATIENT
Start: 2023-02-25

## 2023-02-25 RX ORDER — ONDANSETRON 2 MG/ML
4 INJECTION INTRAMUSCULAR; INTRAVENOUS EVERY 6 HOURS PRN
Status: CANCELLED | OUTPATIENT
Start: 2023-02-25

## 2023-02-25 RX ORDER — LOSARTAN POTASSIUM 100 MG/1
100 TABLET ORAL DAILY
Status: DISCONTINUED | OUTPATIENT
Start: 2023-02-25 | End: 2023-02-25 | Stop reason: HOSPADM

## 2023-02-25 RX ORDER — INSULIN LISPRO 100 [IU]/ML
0-4 INJECTION, SOLUTION INTRAVENOUS; SUBCUTANEOUS EVERY 4 HOURS
Status: DISCONTINUED | OUTPATIENT
Start: 2023-02-25 | End: 2023-03-03 | Stop reason: HOSPADM

## 2023-02-25 RX ORDER — HYDROXYZINE 50 MG/1
50 TABLET, FILM COATED ORAL 3 TIMES DAILY PRN
Status: CANCELLED | OUTPATIENT
Start: 2023-02-25

## 2023-02-25 RX ORDER — SODIUM CHLORIDE 0.9 % (FLUSH) 0.9 %
10 SYRINGE (ML) INJECTION PRN
Status: CANCELLED | OUTPATIENT
Start: 2023-02-25

## 2023-02-25 RX ADMIN — AMPICILLIN SODIUM AND SULBACTAM SODIUM 3000 MG: 2; 1 INJECTION, POWDER, FOR SOLUTION INTRAMUSCULAR; INTRAVENOUS at 18:51

## 2023-02-25 RX ADMIN — IOPAMIDOL 75 ML: 755 INJECTION, SOLUTION INTRAVENOUS at 16:04

## 2023-02-25 RX ADMIN — SODIUM CHLORIDE: 9 INJECTION, SOLUTION INTRAVENOUS at 18:48

## 2023-02-25 ASSESSMENT — PAIN SCALES - WONG BAKER
WONGBAKER_NUMERICALRESPONSE: 0

## 2023-02-25 ASSESSMENT — LIFESTYLE VARIABLES
HOW MANY STANDARD DRINKS CONTAINING ALCOHOL DO YOU HAVE ON A TYPICAL DAY: PATIENT DOES NOT DRINK
HOW OFTEN DO YOU HAVE A DRINK CONTAINING ALCOHOL: NEVER
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
## 2023-02-25 NOTE — PROGRESS NOTES
Rn accessed the right forearm with 20g peripheral iv.   Good blood return and flushed well with normal saline

## 2023-02-25 NOTE — PROGRESS NOTES
Consult has been called to Dr. Francisco Leos on 2/25/23.  Spoke with   Sera Garnett. 5:26 PM    Jewell Garcia  2/25/2023

## 2023-02-25 NOTE — ED NOTES
Level of Care Disposition: Admit      Patient was seen by ED provider and [facility] staff. The case presented to psychiatric provider on-call Douglas FELDMAN.  Based on the ED evaluation and information presented to the provider by Corry Ardon RN it is the recommendation of the on call psychiatric provider that inpatient hospitalization is the least restrictive environment for the patient at this time. The patient will be admitted to the inpatient unit. Patient will be transferred  to accepting unit as soon as a bed is available.            Gunner Collins RN  02/25/23 0519
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
## 2023-02-25 NOTE — H&P
Hospital Medicine History & Physical      PCP: Audrey Allegan    Date of Admission: 2/25/2023    Date of Service: Pt seen/examined on 2/25/2023     Chief Complaint:  No chief complaint on file. History Of Present Illness: The patient is a [de-identified] y.o. male with pmhx of alzheimers dementia, CAD, DM who presented to [facility] ED with concerning for worsening altered mental status, patient apparently had some odd behavior last night, told family that he was going to kill them, they ultimately brought him in for placement, inability to care for him but he was communicating. He was medically cleared in ED and sent to inpatient [facility]. When I assessed patient he was unable to contribute to history, sleeping and snoring very loudly, hypoxic on RA 85%, sounded extremely congested, could only get him to respond and withdrawal from pain. Did not open his eyes or communicate. He does move all 4 extremities in response to pain. He will be transferred to medical floor. Past Medical History:    No past medical history on file. Past Surgical History:    No past surgical history on file. Medications Prior to Admission:    Prior to Admission medications    Medication Sig Start Date End Date Taking?  Authorizing Provider   donepezil (ARICEPT) 10 MG tablet donepezil 10 mg tablet    Historical Provider, MD   divalproex (DEPAKOTE ER) 500 MG extended release tablet divalproex  mg tablet,extended release 24 hr   TAKE 1 TABLET BY MOUTH ONCE DAILY FOR 2 WEEKS THEN INCREASE TO 1 TABLET TWICE DAILY    Historical Provider, MD   glimepiride (AMARYL) 1 MG tablet TAKE 1 TABLET BY MOUTH EVERY DAY 1/6/23   Historical Provider, MD   losartan (COZAAR) 100 MG tablet losartan 100 mg tablet   TAKE 1 TABLET BY MOUTH DAILY    Historical Provider, MD   atorvastatin (LIPITOR) 40 MG tablet atorvastatin 40 mg tablet   TAKE 1 TABLET BY MOUTH EVERY DAY AT BEDTIME    Historical Provider, MD   amLODIPine (NORVASC) 2.5 MG tablet amlodipine 2.5 mg tablet   TAKE 1 TABLET BY MOUTH EVERY DAY AT BEDTIME    Historical Provider, MD       Allergies:  Patient has no known allergies. Social History:      TOBACCO:   reports that he has never smoked. He has never used smokeless tobacco.  ETOH:   has no history on file for alcohol use. Family History:   Positive as follows:    No family history on file. REVIEW OF SYSTEMS:     Unable to assess     PHYSICAL EXAM:    Pulse 90   Temp 99.4 °F (37.4 °C) (Axillary)   Resp 18   SpO2 91%     Gen: No distress. +lethargic   Eyes: PERRL. No sclera icterus. No conjunctival injection. ENT: No discharge. Pharynx clear. Neck: No JVD. No Carotid Bruit. Trachea midline. Resp: No accessory muscle use.+audible rhonchi bilaterally, requiring 5 L O2   CV: Regular rate. Regular rhythm. No murmur. No rub. No edema. +midline scar   Peripheral Pulses: +2 palpable, equal bilaterally   GI: Non-tender. Non-distended. No masses. No organomegaly. Normal bowel sounds. No hernia. Skin: Warm and dry. No nodule on exposed extremities. No rash on exposed extremities. M/S: No cyanosis. No joint deformity. No clubbing. Neuro:  Will only withdrawal from painful stimuli, will groan and wince with pain   Psych: Unable to assess     Lab Results   Component Value Date    WBC 5.5 02/24/2023    HGB 13.3 (L) 02/24/2023    HCT 39.3 (L) 02/24/2023    MCV 90.6 02/24/2023    PLT 81 (L) 02/24/2023     Lab Results   Component Value Date     02/24/2023    K 3.2 (L) 02/24/2023     02/24/2023    CO2 30 02/24/2023    BUN 19 02/24/2023    CREATININE 1.0 02/24/2023    GLUCOSE 62 (L) 02/24/2023    CALCIUM 9.1 02/24/2023    PROT 6.4 02/24/2023    LABALBU 3.9 02/24/2023    BILITOT 0.4 02/24/2023    ALKPHOS 69 02/24/2023    AST 21 02/24/2023    ALT 19 02/24/2023    LABGLOM >60 02/24/2023    AGRATIO 1.6 02/24/2023           CARDIAC ENZYMES  Recent Labs     02/24/23  1939 02/24/23  2211 02/25/23  1425   TROPONINI 0.13* 0.11* 0.09*       U/A:    Lab Results   Component Value Date/Time    COLORU Yellow 02/24/2023 05:35 PM    WBCUA 0-2 02/24/2023 05:35 PM    RBCUA None seen 02/24/2023 05:35 PM    BACTERIA Rare 02/24/2023 05:35 PM    CLARITYU Clear 02/24/2023 05:35 PM    SPECGRAV 1.010 02/24/2023 05:35 PM    LEUKOCYTESUR Negative 02/24/2023 05:35 PM    BLOODU TRACE-INTACT 02/24/2023 05:35 PM    GLUCOSEU Negative 02/24/2023 05:35 PM       CULTURES  COVID and Flu not detected     EKG: Normal sinus rhythmPossible Inferior infarct , age undeterminedAbnormal ECGNo previous ECGs availableConfirmed by Ronney Apley MD, [address] ([id number]) on 2/25/2023 5:35:04 AM    RADIOLOGY  No orders to display     CT HEAD WO CONTRAST   Final Result   Chronic findings in the brain without acute CT abnormality identified. XR CHEST PORTABLE   Final Result   Mild bibasilar discoid atelectasis or scarring with no acute infiltrate or   effusion         ASSESSMENT/PLAN:  #Acute encephalopathy metabolic vs toxic?  -NPO  -could be 2/2 to medication last night?  Given ativan and geodon in ED  -had negative CT head in ED   -ABG pending  -stat labs pending    #Acute hypoxic respiratory failure   -requiring 5 L O2 while sleeping initially   -continue to wean as tolerated   -CXR was neg in ED   -respiratory called and they were able to suction significant amount of mucus out   -IV unasyn   -procalc, LA pending  -sputum culture pending  -swallow eval   -respiratory following   -CT chest now with dense mucus plugging, consolidations possible aspiration or pna   -pulm consulted     #Alzheimers dementia with behavioral disturbances   - per psychiatry team       #Elevated troponin   0.13--> 0.11 x 2   -EKG without acute ischemic changes   -on ASA, statin  -cardiology consulted, cannot see prior work up      #CAD   -s/p CABG   -on ASA, statin      #HTN   -on losartan and norvasc -NPO  -IV prn labetalol      #DM type 2   -holding oral regimen   -monitor BG    -SSI     DVT Prophylaxis: Lovenox   Diet: Diet NPO  Code Status: Full Code    CAPRICE Mosquera  02/25/23  5:30 PM

## 2023-02-25 NOTE — ED NOTES
Attempted to evaluate patient. Patient is currently resting with eyes closed and I was unable to speak with him at this time.       Laila Chavez RN  02/24/23 1776

## 2023-02-25 NOTE — ED NOTES
Collateral Contact:  Name: Cristina Hardy  Phone: [phone]  Relation to Patient: Daughter  Last Contact with Patient: 2/24/22    Concerns:  According pt's daughter, Vonnie Kirkpatrick, pt was putting both her, and her mother [de-identified][de-identified] y/o) at risk  tonight after threatening to kill the both of them while swinging a \" around. \" He \"looked me in the eye, and said softly, I am going to kill you. \" Pt is also reported to be forcibly wondering off on his own, and walking long distances. Last Monday pt stated he was going to kill himself before storming out of the house of where he made his way on foot from [facility] to [facility] before being found. Pt is unable to sleep, and \"stays up all night,\" which is now reportedly \"unmanageable. \"     He fell today while in the shower; no injuries. Otherwise pt is able to walk, and ambulate well. Pt is also reported to have made other statements about suicide on separate occassions to his wife, although according to her pt has not stated a specific plan, or any ideas about how he would     Pt was dx with Alzheimer's a year ago by [facility] neurology, and recently by by  neuro again for possible, which was ruled out. According to Vonnie Kirkpatrick they can no longer keep pt safe at this time due to his behaviors. Specially family is worried about pt forcibly leaving and endangering himself. He's reportedly made homicidal statements to both daughter, and his wife, and is also, making suicidal statements. Vonnie Kirkpatrick is working to find placement at [facility] on their memory unit. They've been contacted by family, and have reportedly applied. Robert Fabian currently waiting on a tour.       Nallely Bell  02/25/23 0115

## 2023-02-25 NOTE — BH NOTE
Lashanda Monique arrived on the unit accompanied by security and [facility] AN AFFILIATE OF [facility] staff. Lashanda Monique was assisted into bed, oriented to the room and was cooperative with vital signs. Bed alarm is on.

## 2023-02-25 NOTE — PROGRESS NOTES
Behavioral Services  Medicare Certification Upon Admission    I certify that this patient's inpatient psychiatric hospital admission is medically necessary for:    [x] (1) Treatment which could reasonably be expected to improve this patient's condition,       [x] (2) Or for diagnostic study;     AND     [x](2) The inpatient psychiatric services are provided while the individual is under the care of a physician and are included in the individualized plan of care.     Estimated length of stay/service 7 d    Plan for post-hospital care outpt      Electronically signed by Tej Staton MD on 2/25/2023 at 12:43 PM

## 2023-02-25 NOTE — FLOWSHEET NOTE
02/25/23 1644   Vitals   Temp 99.4 °F (37.4 °C)   Temp Source Axillary   Heart Rate 90   Heart Rate Source Monitor   Resp 18   BP Location Right upper arm   BP Upper/Lower Upper   Patient Position Semi fowlers   Pain Assessment   Pain Assessment Grace-Portillo REBECA   Oxygen Therapy   SpO2 91 %   O2 Device None (Room air)   Patient admitted to room 325 from [place]. Patient oriented to room, call light, bed rails, phone, lights and bathroom. Patient instructed about the schedule of the day including: vital sign frequency, lab draws, possible tests, frequency of MD and staff rounds, daily weights, I &O's and prescribed diet. Telemetry box in place, patient aware of placement and reason. Bed locked, in lowest position, side rails up 2/4, call light within reach. Recliner Assessment  Patient is not able to demonstrate the ability to move from a reclining position to an upright position within the recliner due to Alert Mental status. 4 Eyes Skin Assessment     The patient is being assess for   Transfer to New Unit    I agree that 2 RN's have performed a thorough Head to Toe Skin Assessment on the patient. ALL assessment sites listed below have been assessed. Areas assessed for pressure by both nurses:   [x]   Head, Face, and Ears   [x]   Shoulders, Back, and Chest, Abdomen  [x]   Arms, Elbows, and Hands   [x]   Coccyx, Sacrum, and Ischium  [x]   Legs, Feet, and Heels     Skin Intact scattered bruising        Skin Assessed Under all Medical Devices by both nurses:  N/A               All Mepilex Borders were peeled back and area peeked at by both nurses:  No: N/A  Please list where Mepilex Borders are located:               **SHARE this note so that the co-signing nurse is able to place an eSignature**    Co-signer eSignature: Electronically signed by Rober Cardoso RN on 2/25/23 at 6:15 PM EST    Does the Patient have Skin Breakdown related to pressure?   No              Chidi Prevention initiated:  No Wound Care Orders initiated:  No      WOC nurse consulted for Pressure Injury (Stage 3,4, Unstageable, DTI, NWPT, Complex wounds)and New or Established Ostomies:  No      Primary Nurse eSignature: Electronically signed by Joanne Billingsley RN on 2/25/23 at 4:49 PM EST

## 2023-02-25 NOTE — PROGRESS NOTES
Consult has been called to Dr. Marielena Easley on 2/25/23. Spoke with Nasima Menchaca.  5:34 PM    Catrachita Salgado  2/25/2023

## 2023-02-26 LAB
AMMONIA: 16 UMOL/L (ref 16–60)
ANION GAP SERPL CALCULATED.3IONS-SCNC: 10 MMOL/L (ref 3–16)
BASOPHILS ABSOLUTE: 0 K/UL (ref 0–0.2)
BASOPHILS RELATIVE PERCENT: 0.3 %
BUN BLDV-MCNC: 28 MG/DL (ref 7–20)
CALCIUM SERPL-MCNC: 9 MG/DL (ref 8.3–10.6)
CHLORIDE BLD-SCNC: 105 MMOL/L (ref 99–110)
CO2: 30 MMOL/L (ref 21–32)
CREAT SERPL-MCNC: 1.4 MG/DL (ref 0.8–1.3)
EKG ATRIAL RATE: 96 BPM
EKG DIAGNOSIS: NORMAL
EKG P AXIS: 41 DEGREES
EKG P-R INTERVAL: 150 MS
EKG Q-T INTERVAL: 382 MS
EKG QRS DURATION: 78 MS
EKG QTC CALCULATION (BAZETT): 482 MS
EKG R AXIS: -30 DEGREES
EKG T AXIS: 72 DEGREES
EKG VENTRICULAR RATE: 96 BPM
EOSINOPHILS ABSOLUTE: 0 K/UL (ref 0–0.6)
EOSINOPHILS RELATIVE PERCENT: 0 %
GFR SERPL CREATININE-BSD FRML MDRD: 50 ML/MIN/{1.73_M2}
GLUCOSE BLD-MCNC: 100 MG/DL (ref 70–99)
GLUCOSE BLD-MCNC: 105 MG/DL (ref 70–99)
GLUCOSE BLD-MCNC: 74 MG/DL (ref 70–99)
GLUCOSE BLD-MCNC: 88 MG/DL (ref 70–99)
GLUCOSE BLD-MCNC: 90 MG/DL (ref 70–99)
GLUCOSE BLD-MCNC: 94 MG/DL (ref 70–99)
HCT VFR BLD CALC: 42.4 % (ref 40.5–52.5)
HEMOGLOBIN: 13.9 G/DL (ref 13.5–17.5)
LYMPHOCYTES ABSOLUTE: 0.8 K/UL (ref 1–5.1)
LYMPHOCYTES RELATIVE PERCENT: 6.3 %
MCH RBC QN AUTO: 30.4 PG (ref 26–34)
MCHC RBC AUTO-ENTMCNC: 32.8 G/DL (ref 31–36)
MCV RBC AUTO: 92.7 FL (ref 80–100)
MONOCYTES ABSOLUTE: 1.2 K/UL (ref 0–1.3)
MONOCYTES RELATIVE PERCENT: 8.9 %
NEUTROPHILS ABSOLUTE: 11.2 K/UL (ref 1.7–7.7)
NEUTROPHILS RELATIVE PERCENT: 84.5 %
PDW BLD-RTO: 14.5 % (ref 12.4–15.4)
PERFORMED ON: ABNORMAL
PERFORMED ON: NORMAL
PLATELET # BLD: 86 K/UL (ref 135–450)
PMV BLD AUTO: 7.5 FL (ref 5–10.5)
POTASSIUM REFLEX MAGNESIUM: 4.7 MMOL/L (ref 3.5–5.1)
RBC # BLD: 4.57 M/UL (ref 4.2–5.9)
SODIUM BLD-SCNC: 145 MMOL/L (ref 136–145)
WBC # BLD: 13.2 K/UL (ref 4–11)

## 2023-02-26 PROCEDURE — 2580000003 HC RX 258: Performed by: INTERNAL MEDICINE

## 2023-02-26 PROCEDURE — 6360000002 HC RX W HCPCS

## 2023-02-26 PROCEDURE — 85025 COMPLETE CBC W/AUTO DIFF WBC: CPT

## 2023-02-26 PROCEDURE — 99233 SBSQ HOSP IP/OBS HIGH 50: CPT | Performed by: INTERNAL MEDICINE

## 2023-02-26 PROCEDURE — 94761 N-INVAS EAR/PLS OXIMETRY MLT: CPT

## 2023-02-26 PROCEDURE — 93010 ELECTROCARDIOGRAM REPORT: CPT | Performed by: INTERNAL MEDICINE

## 2023-02-26 PROCEDURE — 80048 BASIC METABOLIC PNL TOTAL CA: CPT

## 2023-02-26 PROCEDURE — 36415 COLL VENOUS BLD VENIPUNCTURE: CPT

## 2023-02-26 PROCEDURE — 2060000000 HC ICU INTERMEDIATE R&B

## 2023-02-26 PROCEDURE — 82140 ASSAY OF AMMONIA: CPT

## 2023-02-26 PROCEDURE — 31720 CLEARANCE OF AIRWAYS: CPT

## 2023-02-26 PROCEDURE — 2580000003 HC RX 258

## 2023-02-26 PROCEDURE — 83036 HEMOGLOBIN GLYCOSYLATED A1C: CPT

## 2023-02-26 PROCEDURE — 99223 1ST HOSP IP/OBS HIGH 75: CPT | Performed by: INTERNAL MEDICINE

## 2023-02-26 PROCEDURE — 99222 1ST HOSP IP/OBS MODERATE 55: CPT | Performed by: INTERNAL MEDICINE

## 2023-02-26 PROCEDURE — 2700000000 HC OXYGEN THERAPY PER DAY

## 2023-02-26 RX ORDER — DIVALPROEX SODIUM 250 MG/1
250 TABLET, EXTENDED RELEASE ORAL 2 TIMES DAILY
Status: DISCONTINUED | OUTPATIENT
Start: 2023-02-27 | End: 2023-02-26

## 2023-02-26 RX ORDER — DEXTROSE AND SODIUM CHLORIDE 5; .45 G/100ML; G/100ML
INJECTION, SOLUTION INTRAVENOUS CONTINUOUS
Status: DISCONTINUED | OUTPATIENT
Start: 2023-02-26 | End: 2023-02-28

## 2023-02-26 RX ORDER — ALBUTEROL SULFATE 2.5 MG/3ML
2.5 SOLUTION RESPIRATORY (INHALATION) EVERY 6 HOURS PRN
Status: DISCONTINUED | OUTPATIENT
Start: 2023-02-26 | End: 2023-03-03 | Stop reason: HOSPADM

## 2023-02-26 RX ORDER — DIVALPROEX SODIUM 250 MG/1
250 TABLET, EXTENDED RELEASE ORAL 2 TIMES DAILY
Status: DISCONTINUED | OUTPATIENT
Start: 2023-02-26 | End: 2023-02-27

## 2023-02-26 RX ADMIN — AMPICILLIN SODIUM AND SULBACTAM SODIUM 3000 MG: 2; 1 INJECTION, POWDER, FOR SOLUTION INTRAMUSCULAR; INTRAVENOUS at 12:24

## 2023-02-26 RX ADMIN — AMPICILLIN SODIUM AND SULBACTAM SODIUM 3000 MG: 2; 1 INJECTION, POWDER, FOR SOLUTION INTRAMUSCULAR; INTRAVENOUS at 06:28

## 2023-02-26 RX ADMIN — ENOXAPARIN SODIUM 40 MG: 100 INJECTION SUBCUTANEOUS at 10:05

## 2023-02-26 RX ADMIN — DEXTROSE AND SODIUM CHLORIDE: 5; 450 INJECTION, SOLUTION INTRAVENOUS at 12:23

## 2023-02-26 RX ADMIN — AMPICILLIN SODIUM AND SULBACTAM SODIUM 3000 MG: 2; 1 INJECTION, POWDER, FOR SOLUTION INTRAMUSCULAR; INTRAVENOUS at 00:53

## 2023-02-26 RX ADMIN — AMPICILLIN SODIUM AND SULBACTAM SODIUM 3000 MG: 2; 1 INJECTION, POWDER, FOR SOLUTION INTRAMUSCULAR; INTRAVENOUS at 17:42

## 2023-02-26 ASSESSMENT — PAIN SCALES - WONG BAKER
WONGBAKER_NUMERICALRESPONSE: 0

## 2023-02-26 NOTE — CONSULTS
[facility]   CONSULTATION  [phone]        Reason for Consultation/Chief Complaint: \"I have been asked to see him for elevated troponin.\"    History of Present Illness:  Eliceo Lilly is a 82 y.o. patient who presented to the hospital with complaints of altered mental status.  He was on Psych floor where he was noted to be hypoxic while sleeping on 2.25.23.  He was not c/o any chest pain. He was sent to ED and had work up with findings of elevated troponin and mucous plugging in his lungs.  He is sleepy but partially arousable. He does not appear to be in pain and actually denies any chest pain yesterday or today.  He has history of CABG.He suffers from Alzheimer's dementia.  EKG non specific and not diagnostic.  I have been asked to provide consultation regarding further management and testing.      Past Medical History:CABG   Diabetes mellitus  Hypertension  High cholesterol    has no past medical history on file.    Surgical History:   has no past surgical history on file.     Social History:   reports that he has never smoked. He has never used smokeless tobacco.     Family History:  family history is not on file.     Home Medications:  Were reviewed and are listed in nursing record. and/or listed below  Prior to Admission medications    Medication Sig Start Date End Date Taking? Authorizing Provider   donepezil (ARICEPT) 10 MG tablet donepezil 10 mg tablet    Historical Provider, MD   divalproex (DEPAKOTE ER) 500 MG extended release tablet divalproex  mg tablet,extended release 24 hr   TAKE 1 TABLET BY MOUTH ONCE DAILY FOR 2 WEEKS THEN INCREASE TO 1 TABLET TWICE DAILY    Historical Provider, MD   glimepiride (AMARYL) 1 MG tablet TAKE 1 TABLET BY MOUTH EVERY DAY 1/6/23   Historical Provider, MD   losartan (COZAAR) 100 MG tablet losartan 100 mg tablet   TAKE 1 TABLET BY MOUTH DAILY    Historical Provider, MD   atorvastatin (LIPITOR) 40 MG tablet atorvastatin 40 mg tablet   TAKE 1 TABLET BY  MOUTH EVERY DAY AT BEDTIME    Historical Provider, MD   amLODIPine (NORVASC) 2.5 MG tablet amlodipine 2.5 mg tablet   TAKE 1 TABLET BY MOUTH EVERY DAY AT BEDTIME    Historical Provider, MD        Allergies:  Patient has no known allergies. Review of Systems:   12 point ROS negative in all areas as listed below except as in [place]  Constitutional, EENT,  GI, , Musculoskeletal, skin, neurological, hematological, endocrine.     Physical Examination:    Vitals:    02/26/23 0336   BP: (!) 144/82   Pulse: 78   Resp: 16   Temp: 97.8 °F (36.6 °C)   SpO2: 95%    Weight: 128 lb 1 oz (58.1 kg)         General Appearance:  Alert, cooperative, no distress, appears stated age   Head:  Normocephalic, without obvious abnormality, atraumatic   Eyes:  PERRL, conjunctiva/corneas clear       Nose: Nares normal, no drainage or sinus tenderness   Throat: Lips, mucosa, and tongue normal   Neck: Supple, symmetrical, trachea midline, no adenopathy, thyroid: not enlarged, symmetric, no tenderness/mass/nodules, no carotid bruit or JVD       Lungs:   Diminished  to auscultation bilaterally, respirations unlabored   Chest Wall:  No tenderness or deformity   Heart:  Regular rate and rhythm, S1, S2 normal, no murmur, rub or gallop   Abdomen:   Soft, non-tender, bowel sounds active all four quadrants,  no masses, no organomegaly           Extremities: Extremities normal, atraumatic, no cyanosis or edema   Pulses: 1+ and symmetric   Skin: Skin color, texture, turgor normal, no rashes or lesions   Pysch: Normal mood and affect   Neurologic: Normal gross motor and sensory exam.         Labs  CBC:   Lab Results   Component Value Date/Time    WBC 13.2 02/26/2023 05:03 AM    RBC 4.57 02/26/2023 05:03 AM    HGB 13.9 02/26/2023 05:03 AM    HCT 42.4 02/26/2023 05:03 AM    MCV 92.7 02/26/2023 05:03 AM    RDW 14.5 02/26/2023 05:03 AM    PLT 86 02/26/2023 05:03 AM     CMP:    Lab Results   Component Value Date/Time     02/26/2023 05:03 AM    K 4.7 02/26/2023 05:03 AM     02/26/2023 05:03 AM    CO2 30 02/26/2023 05:03 AM    BUN 28 02/26/2023 05:03 AM    CREATININE 1.4 02/26/2023 05:03 AM    AGRATIO 1.6 02/24/2023 07:39 PM    LABGLOM 50 02/26/2023 05:03 AM    GLUCOSE 100 02/26/2023 05:03 AM    PROT 6.4 02/24/2023 07:39 PM    CALCIUM 9.0 02/26/2023 05:03 AM    BILITOT 0.4 02/24/2023 07:39 PM    ALKPHOS 69 02/24/2023 07:39 PM    AST 21 02/24/2023 07:39 PM    ALT 19 02/24/2023 07:39 PM     PT/INR:  No results found for: PTINR  Lab Results   Component Value Date    TROPONINI 0.09 (H) 02/25/2023   Creatinine 1.4  Troponin  .13    0.11  0.09    Normal sinus rhythmLeft axis deviationNonspecific T wave abnormalityAbnormal ECGWhen compared with ECG of 24-FEB-2023 19:24,Significant changes have occurredConfirmed by Chet Jorgensen MD, [address] (1986) on 2/26/2023 8:05:21 AM Normal sinus rhythmPossible Inferior infarct , age undeterminedAbnormal ECGNo previous ECGs availableConfirmed by Chet Jorgensen MD, [address] (1986) on 2/25/2023 5:35:04 AM      Resulting Agency      2.25.23CT Chest with contrast       Impression   1. Dense mucous plugging within both lower lobes, with partial consolidation   of both lower lobes, which could represent either aspiration or pneumonia. 2. Cholelithiasis. Assessment  Acute myocardial injury troponin declining no chest pain and no definite ischemia on EKG. This is in setting of hypoxia due to mucous plugging and possible pneumonia. Coronary artery bypass surgery  Primary hypertension  Hypercholesterolemia    He is not appropriate at this time for any ischemic evaluation due to his dementia and lack of symptoms of angina  However if he was to c/o angina I will just treat it symptomatically with nitrates and beta blockers. Continue meds for HBP elevated cholesterol and asa        Thank you for allowing to us to participate in the care or Pat Navarro.  Further evaluation will be based upon the patient's clinical course and testing results. All questions and concerns were addressed to the patient/family. Alternatives to my treatment were discussed. The note was completed using EMR. Every effort was made to ensure accuracy; however, inadvertent computerized transcription errors may be present.

## 2023-02-26 NOTE — H&P
[address]                 [address] [place] [address]                              HISTORY AND PHYSICAL    PATIENT NAME: Elias Sicard                         :        1940  MED REC NO:   [id number]                          ROOM:       2207  ACCOUNT NO:   [de-identified]                           ADMIT DATE: 2023  PROVIDER:     Delphine Lozada. Daniel Martinez MD    CHIEF COMPLAINT:  Dementia. HISTORY OF PRESENT ILLNESS:  The patient in an 51-year-old male with a  history of Alzheimer's who is brought to the ED at [facility] on  2023 after he had been wandering from home for miles in the past  few days. Apparently, family has been concerned about his altered  mental status. Apparently, he has been leaving the house for the past  few days. The patient in the ED was unable to give much information  regarding his current behavior. There is collateral from his daughter,  Donell Pettit and she stated that the patient was putting her and the  patient's wife at risk after threatened to kill both of them while  swinging a . Daughter states that he looked her in the eye and  said \"I'm going to kill you. \"  Apparently, he has also been wandering  off on his own for long distances. Last week he said he was going to  kill himself before storming out of the house and made his way from  [facility] to [place] before being found. Apparently, he has been  sleeping poorly, staying up all night. Family feels like they cannot  manage him safely. He also fell in the shower but normally could walk  and ambulate. Apparently, he has made up a statement about suicide on  other occasions to his wife but no specific plan. He was diagnosed with  Alzheimer's a year ago by [facility] and family reports that they can  no longer keep him safe in the home and are looking for placement at  [facility].     MEDICAL HISTORY:  Unable to obtain other than hypertension. REVIEW OF SYSTEMS:  Unable to respond to questions regarding review of  systems. VITAL SIGNS:  Temperature 98.1, pulse 97, respirations 18, blood  pressure 142/71. LABORATORY DATA:  Troponin was elevated at 0.13 yesterday then 0.11 and  now 0.09 today. No ethanol. Urinalysis shows protein and blood. Potassium 3.2, white count 5.5, hemoglobin 13.3, hematocrit 39.3. TSH  3.38. EKG, 02/24/2023, QTc of 455, rate of 91. PHYSICAL EXAMINATION:  CAPRICE Sadler, 02/25/2023. CURRENT MEDICATIONS:  Norvasc 2.5 mg nightly, aspirin 81 mg daily,  Lipitor 40 mg nightly, Depakote  mg daily, Aricept 10 mg nightly,  Glucotrol 2.5 mg daily, Cozaar 100 mg daily, potassium chloride 40 mEq  once. LEGAL ISSUES:  None per family. TRAUMA HISTORY:  Unable to obtain from the patient. FAMILY PSYCH HISTORY:  Unable to obtain from the patient. MENTAL STATUS EXAMINATION:  The patient is an 74-year-old male. He is  in bed. I was unable to awaken him today. There is some concern that  he appears congested. He had a chest x-ray yesterday that showed mild  atelectasis but no infiltrates. CAPRICE Sadler was evaluating the  patient today and was unable to awaken him. There is concern that with  him being unable to be awakened and with his elevated troponin that he  will be moved to the medical unit 2-West today to help manage him  medically and once stabilized will return to the [facility] Psychiatry. DIAGNOSES:  AXIS I:  Dementia with behavioral disturbance. AXIS II:  Deferred. AXIS III:  See medical history. AXIS IV:  Severe. AXIS V:  45. PLAN:  1. Discharge and readmit to Medical.  2.  Continue to follow through consult service. 3.  Return to [facility] Psychiatry once medically stabilized. 4.  Goal for discharge will be for the patient to show improved  behavior, pharmacotherapy, and placement per family in a memory care  unit.     I spent approximately 75 minutes on this eval with more than 50% of the  time discussing the patient's care and treatment options.         Jenaro Montesinos MD    D: 02/25/2023 15:15:43       T: 02/25/2023 15:19:03     [email or web address]  Job#: [id number]     Doc#: [id number]    CC:

## 2023-02-26 NOTE — FLOWSHEET NOTE
02/26/23 1001   Vitals   Temp 97.6 °F (36.4 °C)   Temp Source Axillary   Heart Rate 78   Heart Rate Source Monitor   Resp 16   BP (!) 143/80   MAP (Calculated) 101   BP Location Right upper arm   BP Upper/Lower Upper   BP Method Automatic   Patient Position Semi fowlers   Level of Consciousness 0   MEWS Score 1   Cardiac Rhythm Sinus tachy   Oxygen Therapy   SpO2 96 %   O2 Device None (Room air)   Shift assessment completed-see flow sheet. Patient in bed still lethargic, able to open eyes to name. Unable to answer questions. Vitals stable. HR 78, sinus on monitor. 96% on RA, lung sounds diminished. Patient repositioned in, tele-sitter and bed alarm in place.

## 2023-02-26 NOTE — PROGRESS NOTES
Progress Note    Admit Date:  2/25/2023    -pmhx of dementia    Transferred from psychiatry for confusion and hypoxia, abn troponin    Subjective:  Mr. Lilly today is awake, eyes open, moving around in the bed. He is vocal but speech is incomprehensible.   He is on room air.     Objective:   Patient Vitals for the past 4 hrs:   BP Temp Temp src Pulse Resp SpO2   02/26/23 1001 (!) 143/80 97.6 °F (36.4 °C) Axillary 78 16 96 %          Intake/Output Summary (Last 24 hours) at 2/26/2023 1150  Last data filed at 2/26/2023 0420  Gross per 24 hour   Intake --   Output 300 ml   Net -300 ml       Physical Exam:    Gen: No distress. Alert.   Eyes: PERRL. No sclera icterus. No conjunctival injection.   ENT: No discharge. Pharynx clear.   Neck: No JVD.  Trachea midline.  Resp: No accessory muscle use. No crackles. No wheezes. No rhonchi.   CV: Regular rate. Regular rhythm. No murmur.  No rub. No edema.   Peripheral Pulses: +2 palpable, equal bilaterally   GI: Non-tender. Non-distended.  Normal bowel sounds.  Skin: Warm and dry. No nodule on exposed extremities. No rash on exposed extremities.   M/S: No cyanosis. No joint deformity. No clubbing.   Neuro: Awake. Grossly nonfocal    Psych: +Agitated         I Donis Solares MD have reviewed the chart on Eliceo Lilly and personally interviewed and examined patient, reviewed the data (labs and imaging) and after discussion with my PA formulated the plan.  Agree with note with the following edits.    HPI:     I reviewed the patient's Past Medical History, Past Surgical History, Medications, and Allergies.       Transferred from psychiatry for confusion and hypoxia, abn troponin    Remains very drowsy , off o2 today       General:  elderly male, drowsy, eyes closed, pupils nery reactive Appears to be not in any distress  Mucous Membranes:  Pink , anicteric  Neck: No JVD, no carotid bruit, no thyromegaly  Chest:  Clear to auscultation bilaterally, diminished in  bases  Cardiovascular:  RRR S1S2 heard, no murmurs or gallops  Abdomen:  Soft, undistended, non tender, no organomegaly, BS present  Extremities: No edema or cyanosis.  Distal pulses well felt  Neurological : drowsy but moving all ext              Medications:  [START ON 2/27/2023] divalproex, 250 mg, BID  vitamin E, 400 Units, Daily  vitamin B-12, 500 mcg, Daily  therapeutic multivitamin-minerals, 1 tablet, Daily with breakfast  losartan, 100 mg, Daily  donepezil, 10 mg, Nightly  atorvastatin, 40 mg, Nightly  aspirin, 81 mg, Daily  amLODIPine, 2.5 mg, Nightly  sodium chloride flush, 5-40 mL, 2 times per day  enoxaparin, 40 mg, Daily  insulin lispro, 0-4 Units, Q4H  ampicillin-sulbactam, 3,000 mg, Q6H      PRN Medications:  perflutren lipid microspheres, 1.5 mL, ONCE PRN  albuterol, 2.5 mg, Q6H PRN  OLANZapine, 5 mg, Q8H PRN   Or  OLANZapine (ZyPREXA) in sterile water IntraMUSCular, 5 mg, Q8H PRN  hydrOXYzine HCl, 50 mg, TID PRN  benztropine mesylate, 2 mg, BID PRN  aluminum & magnesium hydroxide-simethicone, 30 mL, Q6H PRN  sodium chloride flush, 10 mL, PRN  sodium chloride, , PRN  potassium chloride, 40 mEq, PRN   Or  potassium alternative oral replacement, 40 mEq, PRN   Or  potassium chloride, 10 mEq, PRN  magnesium sulfate, 1,000 mg, PRN  ondansetron, 4 mg, Q8H PRN   Or  ondansetron, 4 mg, Q6H PRN  polyethylene glycol, 17 g, Daily PRN  acetaminophen, 650 mg, Q6H PRN   Or  acetaminophen, 650 mg, Q6H PRN  glucose, 4 tablet, PRN  dextrose bolus, 125 mL, PRN   Or  dextrose bolus, 250 mL, PRN  glucagon (rDNA), 1 mg, PRN  dextrose, , Continuous PRN  labetalol, 10 mg, Q6H PRN          Data:  CBC:   Recent Labs     02/24/23  1939 02/25/23  1722 02/26/23  0503   WBC 5.5 12.5* 13.2*   HGB 13.3* 15.5 13.9   HCT 39.3* 47.1 42.4   MCV 90.6 93.7 92.7   PLT 81* 80* 86*     BMP:   Recent Labs     02/24/23  1939 02/25/23  1425 02/26/23  0503    143 145   K 3.2* 3.8 4.7    103 105   CO2 30 26 30   BUN 19 23* 28* CREATININE 1.0 1.4* 1.4*     LIVER PROFILE:   Recent Labs     02/24/23  1939   AST 21   ALT 19   BILITOT 0.4   ALKPHOS 69     PT/INR: No results for input(s): PROTIME, INR in the last 72 hours. CULTURES  COVID and Flu not detected   Sputum culture pending      RADIOLOGY  XR CHEST PORTABLE   Final Result   Bands of opacity in the lower lungs bilaterally, new when compared to the   previous exam.  These are probably atelectasis, but pneumonia or aspiration   remains a differential.           CT chest 2/26/23    1. Dense mucous plugging within both lower lobes, with partial consolidation   of both lower lobes, which could represent either aspiration or pneumonia. 2. Cholelithiasis. Assessment/Plan:    #Acute encephalopathy metabolic vs toxic?  -NPO  -could be 2/2 to medication last night?  Given ativan and geodon in ED  -holding depakote and other sedatives   -had negative CT head in ED   -ABG -> WNLs   - check ammonia      #Acute hypoxic respiratory failure   - sec to atelectasis vs aspiration pneumonia   -requiring 5 L O2 while sleeping initially   -continue to wean as tolerated   -CXR was neg in ED   --CT chest now with dense mucus plugging, consolidations possible aspiration or pna   -respiratory team were able to suction significant amount of mucus out   -IV unasyn started   -procalc, LA  elevated   -sputum culture pending  -NPO now, swallow eval when awake  -on RA today   -pulm consulted      #Alzheimers dementia with behavioral disturbances   - per psychiatry team  - holding psych meds for drowsiness    #Elevated Cr   -Cr 1.4 today  -IVF   -holding ARB   -continue to monitor      #Elevated troponin   0.13--> 0.11 x 2   -EKG without acute ischemic changes   -on ASA, statin  -cardiology consulted, cannot see prior work up --> no current work up      #CAD   -s/p CABG   -on ASA, statin      #HTN   -on losartan and norvasc -NPO  -IV prn labetalol      #DM type 2   -holding oral regimen   -monitor BG -SSI      #Hx of seizures   -on depakote     DVT Prophylaxis: Lovenox   Diet: Diet NPO  Code Status: Full Code    CAPRICE Chaudhari  02/26/23  12:08 PM    Agree with above  Changes made to note    Renetta Damon MD,2/26/2023 2:09 PM

## 2023-02-26 NOTE — CARE COORDINATION
Chart reviewed. Patient transferred from [facility] psych due to hypoxia and decreased mentation. From home with support of 3 children. Wife cannot managed. Per notes, family seeking memory care at [place] [phone]. Cm will follow clinical progress. Currently on RA with sat of 95%.  Marisela Peters RN

## 2023-02-26 NOTE — PROGRESS NOTES
Patient attempts to get out of bed on multiple occasions, telesitter at bedside, bed alarm on, report given to Edis Barron

## 2023-02-26 NOTE — PROGRESS NOTES
Spoke to Daughter Anca Prieto on the phone. Statees patient is only orient to self, can be combative and an elopement risk, states patient is fit and strong and will try to leave, is confused with alzheimers. Plans are for a memory care unit at discharge. Patient is able to eat food without assistance and take pills whole. Patient is incontinent of urine. Will become confused and angry and sad with labile feelings.   Assured would pass along information for care

## 2023-02-26 NOTE — CONSULTS
[facility]                                 Pulmonary Consult /Progress Note :                                                                  CHIEF COMPLAINT: worsening SOB ,hypoxia    Consulting provider: Elisabeth VASQUES    HPI:   Patient does not talk   History from records and talking to staff    The patient is a 82 y.o. male with pmhx of alzheimers dementia,     He  presented to [facility] ED with  worsening altered mental status, patient apparently had some odd behavior last night,     He was  assessed by IM team and patient was unable to contribute to history, sleeping and snoring very loudly, hypoxic on RA 85%, sounded extremely congested, Patient did not respond and withdrawal from pain. Did not open his eyes or communicate. He does move all 4 extremities in response to pain.     He was transferred to PCU .   past medical history on file.  dementia  Past Surgical History:    No past surgical history on file.  Can not obtain   Allergies:  has No Known Allergies.  Social History:    TOBACCO:   reports that he has never smoked. He has never used smokeless tobacco.  ETOH:   has no history on file for alcohol use.      Family History:   No family history on file.    Current Medications:    Current Facility-Administered Medications:     [START ON 2/27/2023] divalproex (DEPAKOTE ER) extended release tablet 250 mg, 250 mg, Oral, BID, Donis Solares MD    perflutren lipid microspheres (DEFINITY) injection 1.5 mL, 1.5 mL, IntraVENous, ONCE PRN, Donis Solares MD    dextrose 5 % and 0.45 % sodium chloride infusion, , IntraVENous, Continuous, Donis Solares MD    albuterol (PROVENTIL) nebulizer solution 2.5 mg, 2.5 mg, Nebulization, Q6H PRN, Donis Solares MD    vitamin E capsule 400 Units, 400 Units, Oral, Daily, CAPRICE Giordano    vitamin B-12 (CYANOCOBALAMIN) tablet 500 mcg, 500 mcg, Oral, Daily, CAPRICE Giordano     therapeutic multivitamin-minerals 1 tablet, 1 tablet, Oral, Daily with breakfast, CAPRICE Giordano    OLANZapine (ZYPREXA) tablet 5 mg, 5 mg, Oral, Q8H PRN **OR** OLANZapine (ZYPREXA) 5 mg in sterile water 1 mL injection, 5 mg, IntraMUSCular, Q8H PRN, CAPRICE Yates    losartan (COZAAR) tablet 100 mg, 100 mg, Oral, Daily, CAPRICE Giordano    hydrOXYzine HCl (ATARAX) tablet 50 mg, 50 mg, Oral, TID PRN, CAPRICE Yates    donepezil (ARICEPT) tablet 10 mg, 10 mg, Oral, Nightly, CAPRICE Giordano    benztropine mesylate (COGENTIN) injection 2 mg, 2 mg, IntraMUSCular, BID PRN, CAPRICE Yates    atorvastatin (LIPITOR) tablet 40 mg, 40 mg, Oral, Nightly, CAPRICE Giordano    aspirin EC tablet 81 mg, 81 mg, Oral, Daily, CAPRICE Giordano    amLODIPine (NORVASC) tablet 2.5 mg, 2.5 mg, Oral, Nightly, CAPRICE Giordano    aluminum & magnesium hydroxide-simethicone (MAALOX) 200-200-20 MG/5ML suspension 30 mL, 30 mL, Oral, Q6H PRN, CAPRICE Giordano    sodium chloride flush 0.9 % injection 5-40 mL, 5-40 mL, IntraVENous, 2 times per day, CAPRICE Yaets    sodium chloride flush 0.9 % injection 10 mL, 10 mL, IntraVENous, PRN, CAPRICE Giordano    0.9 % sodium chloride infusion, , IntraVENous, PRN, CAPRICE Vanegas    potassium chloride (KLOR-CON M) extended release tablet 40 mEq, 40 mEq, Oral, PRN **OR** potassium bicarb-citric acid (EFFER-K) effervescent tablet 40 mEq, 40 mEq, Oral, PRN **OR** potassium chloride 10 mEq/100 mL IVPB (Peripheral Line), 10 mEq, IntraVENous, PRN, CAPRICE Vanegas    magnesium sulfate 1000 mg in dextrose 5% 100 mL IVPB, 1,000 mg, IntraVENous, PRN, CAPRICE Vanegas    enoxaparin (LOVENOX) injection 40 mg, 40 mg, SubCUTAneous, Daily, CAPRICE Giordano    ondansetron (ZOFRAN-ODT) disintegrating tablet 4 mg, 4 mg, Oral, Q8H PRN **OR** ondansetron (ZOFRAN) injection 4 mg, 4 mg, IntraVENous, Q6H PRN, CAPRICE Yates    polyethylene glycol (GLYCOLAX) packet 17 g, 17 g, Oral, Daily PRN, CAPRICE Cantu    acetaminophen (TYLENOL) tablet 650 mg, 650 mg, Oral, Q6H PRN **OR** acetaminophen (TYLENOL) suppository 650 mg, 650 mg, Rectal, Q6H PRN, CAPRICE Giordano    glucose chewable tablet 16 g, 4 tablet, Oral, PRN, CAPRICE Giordano    dextrose bolus 10% 125 mL, 125 mL, IntraVENous, PRN **OR** dextrose bolus 10% 250 mL, 250 mL, IntraVENous, PRN, CAPRICE Giordano    glucagon (rDNA) injection 1 mg, 1 mg, SubCUTAneous, PRN, CAPRICE Giordano    dextrose 10 % infusion, , IntraVENous, Continuous PRN, CAPRICE Giordano    insulin lispro (HUMALOG) injection vial 0-4 Units, 0-4 Units, SubCUTAneous, Q4H, CAPRICE Giordano    labetalol (NORMODYNE;TRANDATE) injection 10 mg, 10 mg, IntraVENous, Q6H PRN, CAPRICE Giordano    ampicillin-sulbactam (UNASYN) 3,000 mg in sodium chloride 0.9 % 100 mL IVPB (mini-bag), 3,000 mg, IntraVENous, Q6H, CAPRICE Giordano, Last Rate: 200 mL/hr at 02/26/23 0628, 3,000 mg at 02/26/23 8499      REVIEW OF SYSTEMS:  Cannot obtain       Objective:   PHYSICAL EXAM:    Blood pressure (!) 144/82, pulse 78, temperature 97.8 °F (36.6 °C), temperature source Axillary, resp. rate 16, height 5' 4\" (1.626 m), weight 128 lb 1 oz (58.1 kg), SpO2 95 %.' on RA  Gen: No distress. Eyes: PERRL. No sclera icterus. No conjunctival injection. ENT: No discharge. Pharynx clear. Neck: Trachea midline. No obvious mass. Resp:scattered rhonchi/wheezing /rales/CTA bilateral   CV: Regular rate. Regular rhythm. No murmur or rub. No edema. GI: Non-tender. Non-distended. No hernia. Skin: Warm and dry. No nodule on exposed extremities. Lymph: No cervical LAD. No supraclavicular LAD. M/S: No cyanosis. No joint deformity. No clubbing. Neuro: Awake. Alert. Moves all four extremities. Psych: Oriented x 3. No anxiety.              LABS/IMAGING:    CBC:  Lab Results   Component Value Date    WBC 13.2 (H) 02/26/2023    HGB 13.9 02/26/2023    HCT 42.4 02/26/2023    MCV 92.7 02/26/2023    PLT 86 (L) 02/26/2023    LYMPHOPCT 6.3 02/26/2023    RBC 4.57 02/26/2023    MCH 30.4 02/26/2023    MCHC 32.8 02/26/2023    RDW 14.5 02/26/2023    NEUTOPHILPCT 84.5 02/26/2023    MONOPCT 8.9 02/26/2023    BASOPCT 0.3 02/26/2023    NEUTROABS 11.2 (H) 02/26/2023    LYMPHSABS 0.8 (L) 02/26/2023    MONOSABS 1.2 02/26/2023    EOSABS 0.0 02/26/2023    BASOSABS 0.0 02/26/2023       Recent Labs     02/26/23  0503 02/25/23  1722 02/24/23 1939   WBC 13.2* 12.5* 5.5   HGB 13.9 15.5 13.3*   HCT 42.4 47.1 39.3*   MCV 92.7 93.7 90.6   PLT 86* 80* 81*       BMP:   Recent Labs     02/24/23  1939 02/25/23  1425 02/26/23  0503    143 145   K 3.2* 3.8 4.7    103 105   CO2 30 26 30   BUN 19 23* 28*   CREATININE 1.0 1.4* 1.4*       MG:   Lab Results   Component Value Date/Time    MG 2.10 02/24/2023 07:39 PM     Ca/Phos:   Lab Results   Component Value Date    CALCIUM 9.0 02/26/2023     LIVER PROFILE:   Recent Labs     02/24/23 1939   AST 21   ALT 19   BILITOT 0.4   ALKPHOS 69       PT/INR: No results for input(s): PROTIME, INR in the last 72 hours. APTT: No results for input(s): APTT in the last 72 hours.         MV Settings:     / / /     IV:   dextrose 5 % and 0.45 % NaCl      sodium chloride      dextrose             Medications:  Scheduled Meds:   [START ON 2/27/2023] divalproex  250 mg Oral BID    vitamin E  400 Units Oral Daily    vitamin B-12  500 mcg Oral Daily    therapeutic multivitamin-minerals  1 tablet Oral Daily with breakfast    losartan  100 mg Oral Daily    donepezil  10 mg Oral Nightly    atorvastatin  40 mg Oral Nightly    aspirin  81 mg Oral Daily    amLODIPine  2.5 mg Oral Nightly    sodium chloride flush  5-40 mL IntraVENous 2 times per day    enoxaparin  40 mg SubCUTAneous Daily    insulin lispro  0-4 Units SubCUTAneous Q4H    ampicillin-sulbactam  3,000 mg IntraVENous Q6H       PRN Meds:  perflutren lipid microspheres, albuterol, OLANZapine **OR** OLANZapine (ZyPREXA) in sterile water IntraMUSCular, hydrOXYzine HCl, benztropine mesylate, aluminum & magnesium hydroxide-simethicone, sodium chloride flush, sodium chloride, potassium chloride **OR** potassium alternative oral replacement **OR** potassium chloride, magnesium sulfate, ondansetron **OR** ondansetron, polyethylene glycol, acetaminophen **OR** acetaminophen, glucose, dextrose bolus **OR** dextrose bolus, glucagon (rDNA), dextrose, labetalol    Results:  CBC:   Recent Labs     02/24/23 1939 02/25/23  1722 02/26/23  0503   WBC 5.5 12.5* 13.2*   HGB 13.3* 15.5 13.9   HCT 39.3* 47.1 42.4   MCV 90.6 93.7 92.7   PLT 81* 80* 86*     BMP:   Recent Labs     02/24/23 1939 02/25/23  1425 02/26/23  0503    143 145   K 3.2* 3.8 4.7    103 105   CO2 30 26 30   BUN 19 23* 28*   CREATININE 1.0 1.4* 1.4*     LIVER PROFILE:   Recent Labs     02/24/23 1939   AST 21   ALT 19   BILITOT 0.4   ALKPHOS 69     PT/INR: No results for input(s): PROTIME, INR in the last 72 hours. APTT: No results for input(s): APTT in the last 72 hours.   UA:  Recent Labs     02/24/23  1735   COLORU Yellow   PHUR 6.0   WBCUA 0-2   RBCUA None seen   BACTERIA Rare*   CLARITYU Clear   SPECGRAV 1.010   LEUKOCYTESUR Negative   UROBILINOGEN 0.2   BILIRUBINUR Negative   BLOODU TRACE-INTACT*   GLUCOSEU Negative       Cultures:      Films:  CXR reviewed by me and it showed b/l infiltrate     Assessment:       -Acute Hypoxic res failure  -Aspiration pneumonia   -Possible mucus Plug   -dementia         Plan:      *-keep sat above 93  *-BD  *-sputum culture  *- Agree with Unasyn   *_NT suction   *- Mucomyst   *-consider Bronch   *-may need chest vest ,aggressive Pulm toilet  *-aspiration precautions,  Head of bed 35 d   NPO     Thank you very much for allowing me to participate in the care of this pleasant patient , should you have any questions ,please do not hesitate to contact me      Franky Boyce MD,FCCP  Pulmonary&Critical Care Medicine  [facility]    NOTE: This report was transcribed using voice recognition software. Every effort was made to ensure accuracy; however, inadvertent computerized transcription errors may be present.

## 2023-02-26 NOTE — FLOWSHEET NOTE
02/26/23 1516   Vitals   Temp 97.4 °F (36.3 °C)   Temp Source Axillary   Heart Rate 79   Heart Rate Source Monitor   Resp 16   BP (!) 166/89   MAP (Calculated) 115   BP Location Right upper arm   BP Upper/Lower Upper   BP Method Automatic   Patient Position Semi fowlers   Level of Consciousness 0   MEWS Score 1   Cardiac Rhythm Sinus tachy   Oxygen Therapy   SpO2 94 %   O2 Device None (Room air)   O2 Flow Rate (L/min) 4 L/min   Patient in bed, awakens to name. Vitals obtained. Patient attempting to get out of bed  multiple times today. Tele-sitter in place and bed alarm on. Patient changed and repositioned in bed. No further needs at this time,call light within reach.

## 2023-02-26 NOTE — PLAN OF CARE
Problem: Pain  Goal: Verbalizes/displays adequate comfort level or baseline comfort level  Outcome: Progressing  Flowsheets (Taken 2/26/2023 1250)  Verbalizes/displays adequate comfort level or baseline comfort level: Assess pain using appropriate pain scale     Problem: Chronic Conditions and Co-morbidities  Goal: Patient's chronic conditions and co-morbidity symptoms are monitored and maintained or improved  Outcome: Progressing

## 2023-02-26 NOTE — FLOWSHEET NOTE
At bedside, difficult to arouse, Lynette Shaikh Mai notified, stat chest x ray ordered.   Patient currently on 4 liters of oxygen, patient flinches with deep sternal rub, IV assessed stat lab reviewed with Dr. Lynette Smith, 02 sat currently 97% on 4 liters of 02

## 2023-02-27 PROBLEM — J69.0 ASPIRATION PNEUMONIA OF BOTH LOWER LOBES (HCC): Status: ACTIVE | Noted: 2023-02-27

## 2023-02-27 LAB
ANION GAP SERPL CALCULATED.3IONS-SCNC: 8 MMOL/L (ref 3–16)
BASOPHILS ABSOLUTE: 0 K/UL (ref 0–0.2)
BASOPHILS RELATIVE PERCENT: 0.1 %
BUN BLDV-MCNC: 27 MG/DL (ref 7–20)
CALCIUM SERPL-MCNC: 9 MG/DL (ref 8.3–10.6)
CHLORIDE BLD-SCNC: 106 MMOL/L (ref 99–110)
CO2: 30 MMOL/L (ref 21–32)
CREAT SERPL-MCNC: 1.1 MG/DL (ref 0.8–1.3)
EOSINOPHILS ABSOLUTE: 0 K/UL (ref 0–0.6)
EOSINOPHILS RELATIVE PERCENT: 0 %
ESTIMATED AVERAGE GLUCOSE: 139.9 MG/DL
GFR SERPL CREATININE-BSD FRML MDRD: >60 ML/MIN/{1.73_M2}
GLUCOSE BLD-MCNC: 115 MG/DL (ref 70–99)
GLUCOSE BLD-MCNC: 174 MG/DL (ref 70–99)
GLUCOSE BLD-MCNC: 185 MG/DL (ref 70–99)
GLUCOSE BLD-MCNC: 86 MG/DL (ref 70–99)
GLUCOSE BLD-MCNC: 95 MG/DL (ref 70–99)
GLUCOSE BLD-MCNC: 97 MG/DL (ref 70–99)
HBA1C MFR BLD: 6.5 %
HCT VFR BLD CALC: 41.8 % (ref 40.5–52.5)
HEMOGLOBIN: 14.3 G/DL (ref 13.5–17.5)
LV EF: 55 %
LVEF MODALITY: NORMAL
LYMPHOCYTES ABSOLUTE: 0.8 K/UL (ref 1–5.1)
LYMPHOCYTES RELATIVE PERCENT: 6.8 %
MCH RBC QN AUTO: 31.2 PG (ref 26–34)
MCHC RBC AUTO-ENTMCNC: 34.2 G/DL (ref 31–36)
MCV RBC AUTO: 91.2 FL (ref 80–100)
MONOCYTES ABSOLUTE: 0.8 K/UL (ref 0–1.3)
MONOCYTES RELATIVE PERCENT: 7.1 %
NEUTROPHILS ABSOLUTE: 9.4 K/UL (ref 1.7–7.7)
NEUTROPHILS RELATIVE PERCENT: 86 %
PDW BLD-RTO: 14.5 % (ref 12.4–15.4)
PERFORMED ON: ABNORMAL
PERFORMED ON: NORMAL
PERFORMED ON: NORMAL
PLATELET # BLD: 79 K/UL (ref 135–450)
PMV BLD AUTO: 7.5 FL (ref 5–10.5)
POTASSIUM REFLEX MAGNESIUM: 3.7 MMOL/L (ref 3.5–5.1)
RBC # BLD: 4.58 M/UL (ref 4.2–5.9)
SODIUM BLD-SCNC: 144 MMOL/L (ref 136–145)
TOTAL CK: 1971 U/L (ref 39–308)
WBC # BLD: 11 K/UL (ref 4–11)

## 2023-02-27 PROCEDURE — 6360000002 HC RX W HCPCS: Performed by: INTERNAL MEDICINE

## 2023-02-27 PROCEDURE — 2060000000 HC ICU INTERMEDIATE R&B

## 2023-02-27 PROCEDURE — 2580000003 HC RX 258

## 2023-02-27 PROCEDURE — 85025 COMPLETE CBC W/AUTO DIFF WBC: CPT

## 2023-02-27 PROCEDURE — 6370000000 HC RX 637 (ALT 250 FOR IP): Performed by: INTERNAL MEDICINE

## 2023-02-27 PROCEDURE — 92610 EVALUATE SWALLOWING FUNCTION: CPT

## 2023-02-27 PROCEDURE — 82550 ASSAY OF CK (CPK): CPT

## 2023-02-27 PROCEDURE — 6370000000 HC RX 637 (ALT 250 FOR IP)

## 2023-02-27 PROCEDURE — 6360000002 HC RX W HCPCS

## 2023-02-27 PROCEDURE — 2580000003 HC RX 258: Performed by: INTERNAL MEDICINE

## 2023-02-27 PROCEDURE — 36415 COLL VENOUS BLD VENIPUNCTURE: CPT

## 2023-02-27 PROCEDURE — 92526 ORAL FUNCTION THERAPY: CPT

## 2023-02-27 PROCEDURE — 99233 SBSQ HOSP IP/OBS HIGH 50: CPT | Performed by: INTERNAL MEDICINE

## 2023-02-27 PROCEDURE — 93306 TTE W/DOPPLER COMPLETE: CPT

## 2023-02-27 PROCEDURE — 99232 SBSQ HOSP IP/OBS MODERATE 35: CPT | Performed by: INTERNAL MEDICINE

## 2023-02-27 PROCEDURE — 80048 BASIC METABOLIC PNL TOTAL CA: CPT

## 2023-02-27 RX ORDER — DIVALPROEX SODIUM 125 MG/1
125 CAPSULE, COATED PELLETS ORAL EVERY 12 HOURS SCHEDULED
Status: DISCONTINUED | OUTPATIENT
Start: 2023-02-27 | End: 2023-03-01

## 2023-02-27 RX ORDER — QUETIAPINE FUMARATE 25 MG/1
12.5 TABLET, FILM COATED ORAL 2 TIMES DAILY
Status: DISCONTINUED | OUTPATIENT
Start: 2023-02-27 | End: 2023-02-28

## 2023-02-27 RX ORDER — DIMETHICONE, OXYBENZONE, AND PADIMATE O 2; 2.5; 6.6 G/100G; G/100G; G/100G
STICK TOPICAL
Status: COMPLETED
Start: 2023-02-27 | End: 2023-02-27

## 2023-02-27 RX ORDER — HALOPERIDOL 5 MG/ML
5 INJECTION INTRAMUSCULAR ONCE
Status: COMPLETED | OUTPATIENT
Start: 2023-02-27 | End: 2023-02-27

## 2023-02-27 RX ADMIN — AMPICILLIN SODIUM AND SULBACTAM SODIUM 3000 MG: 2; 1 INJECTION, POWDER, FOR SOLUTION INTRAMUSCULAR; INTRAVENOUS at 00:05

## 2023-02-27 RX ADMIN — DEXTROSE AND SODIUM CHLORIDE: 5; 450 INJECTION, SOLUTION INTRAVENOUS at 08:55

## 2023-02-27 RX ADMIN — AMPICILLIN SODIUM AND SULBACTAM SODIUM 3000 MG: 2; 1 INJECTION, POWDER, FOR SOLUTION INTRAMUSCULAR; INTRAVENOUS at 17:53

## 2023-02-27 RX ADMIN — ATORVASTATIN CALCIUM 40 MG: 40 TABLET, FILM COATED ORAL at 20:24

## 2023-02-27 RX ADMIN — Medication: at 11:49

## 2023-02-27 RX ADMIN — AMPICILLIN SODIUM AND SULBACTAM SODIUM 3000 MG: 2; 1 INJECTION, POWDER, FOR SOLUTION INTRAMUSCULAR; INTRAVENOUS at 10:59

## 2023-02-27 RX ADMIN — QUETIAPINE FUMARATE 12.5 MG: 25 TABLET ORAL at 20:24

## 2023-02-27 RX ADMIN — AMLODIPINE BESYLATE 2.5 MG: 2.5 TABLET ORAL at 20:24

## 2023-02-27 RX ADMIN — HALOPERIDOL LACTATE 5 MG: 5 INJECTION, SOLUTION INTRAMUSCULAR at 11:46

## 2023-02-27 RX ADMIN — AMPICILLIN SODIUM AND SULBACTAM SODIUM 3000 MG: 2; 1 INJECTION, POWDER, FOR SOLUTION INTRAMUSCULAR; INTRAVENOUS at 06:50

## 2023-02-27 RX ADMIN — AMPICILLIN SODIUM AND SULBACTAM SODIUM 3000 MG: 2; 1 INJECTION, POWDER, FOR SOLUTION INTRAMUSCULAR; INTRAVENOUS at 23:43

## 2023-02-27 RX ADMIN — DEXTROSE AND SODIUM CHLORIDE: 5; 450 INJECTION, SOLUTION INTRAVENOUS at 18:36

## 2023-02-27 ASSESSMENT — PAIN SCALES - WONG BAKER

## 2023-02-27 NOTE — PROGRESS NOTES
Speech Language Pathology  Clinical Bedside Swallow Assessment  Facility/Department: [facility] PCU TELEMETRY    Instrumentation: Yes. MBSS is warranted to further assess oropharyngeal structures and function when MONICA improves  Diet recommendation: NPO; Ice chips with SLP/RN only; Meds via alt means of nutrition  ICE CHIPS ONLY WHEN FULLY AWAKE/ ALERT  Risk management: routine oral care, total feed, increase physical mobility as able, general GERD precautions, general aspiration precautions, and hold PO and contact SLP if s/s of aspiration or worsening respiratory status develop. Sonya Dodson  : 1940 (82 y.o.)   MRN: [id number]  ROOM: [place] [address]  ADMISSION DATE: 2023  PATIENT DIAGNOSIS(ES): Acute respiratory failure with hypoxia ([facility] [address] [J96.01]  No chief complaint on file. Patient Active Problem List    Diagnosis Date Noted    Dementia with behavioral disturbance 2023    Acute respiratory failure with hypoxia ([address] 2023    Aspiration into airway 2023    Alzheimer's dementia ([facility] [address] 2023    Primary hypertension 2023    Type 2 diabetes mellitus without complication, without long-term current use of insulin ([address] 2023    Altered mental status 2023     No past medical history on file. No past surgical history on file.   No Known Allergies    DATE ONSET: 2023    Date of Evaluation: 2023   Evaluating Therapist: CORAZON Sandhu    Chart Reviewed: [x] Yes [] No    Current Diet: Diet NPO    Recent Chest Radiography: [x] Chest XR   [] CT of Chest  Date: 23  Impressions  Impression   Bands of opacity in the lower lungs bilaterally, new when compared to the   previous exam.  These are probably atelectasis, but pneumonia or aspiration   remains a differential.     Pain: The patient does not complain of pain    Reason for Referral  Daniela Barron was referred for a bedside swallow evaluation to assess the efficiency of their swallow function, identify signs and symptoms of aspiration and make recommendations regarding safe dietary consistencies, effective compensatory strategies, and safe eating environment. Assessment    Medical record review/interview: Per MD H&P: \"The patient is a [de-identified] y.o. male with pmhx of alzheimers dementia, CAD, DM who presented to [facility] ED with concerning for worsening altered mental status, patient apparently had some odd behavior last night, told family that he was going to kill them, they ultimately brought him in for placement, inability to care for him but he was communicating. He was medically cleared in ED and sent to inpatient [facility] psych. When I assessed patient he was unable to contribute to history, sleeping and snoring very loudly, hypoxic on RA 85%, sounded extremely congested, could only get him to respond and withdrawal from pain. Did not open his eyes or communicate. He does move all 4 extremities in response to pain. He will be transferred to medical floor.  \"    Patient Complaints: minimal responses given  Odynophagia: [x] Yes [] No  Globus Sensation: [] Yes [x] No  SOB with PO intake: [] Yes [x] No  Increased WOB with PO intake: [] Yes [x] No  Reflux Sx's: [] Yes [] No  Weight loss: [] Yes [] No  Coughing/Choking with PO intake: [] Yes [] No  Reduced Appetite: [] Yes [] No  Trouble with Mastication:  [] Yes [] No  Avoidance of Certain Foods:  [] Yes [] No  Premature Satiety:  [] Yes [] No  Recent PNA:  [] Yes [] No  Recurring PNA:  [] Yes [] No  Changes in vocal quality: [] Yes [] No    Baseline Method of Oral Meds: RN reports whole with baseline diet of regular solids  [x] Whole with liquid    [] Cut with liquid    [] Whole with puree    [] Cut in puree    [] Crushed in puree    [] Crushed in liquid    [] Via TF    Additional Reported Symptoms/Complaints: No prior ST per chart review; RN reports possible aspiration with meds on BHI    Predisposing dysphagia risk factors: Cognitive Deficit and Age  Clinical signs of possible chronic dysphagia: reduced PO intake  Precipitating dysphagia risk factors: reduced physical mobility and AMS    Vitals/labs:     Vitals:    02/26/23 2015 02/27/23 0130 02/27/23 0600 02/27/23 0645   BP: (!) 146/88 (!) 156/87     Pulse: 72 74     Resp: 20      Temp: 97.9 °F (36.6 °C) 97.4 °F (36.3 °C)     TempSrc: Axillary Axillary     SpO2:  95%  97%   Weight:   126 lb 14.4 oz (57.6 kg) 126 lb 14.4 oz (57.6 kg)   Height:            CBC:   Recent Labs     02/27/23 0523   WBC 11.0   HGB 14.3   PLT 79*      BMP:  Recent Labs     02/27/23 0523      K 3.7      CO2 30   BUN 27*   CREATININE 1.1   GLUCOSE 97      Cranial nerve exam: unable to formally assess d/t pt not following dx; informally assessed with results below  CN V (trigeminal): ophthalmic, maxillary, and mandibular facial sensation- Reduced  CN VII (facial): Reduced  CN IX/X (glossopharyngeal/vagus): MPT: ASHLEY; pitch range: ASHLEY; vocal quality: weak; cough: Strong-perceptually, Non-Productive, and Dry  CN XII (hypoglossal): Reduced    Laryngeal function exam:   Secretions: dried oral mucosa; brown spots on lingual surface  Vocal quality: See CN exam above  MPT: See CN exam above  S/Z ratio: DNT  Pitch range: See CN exam above  Cough: See CN exam above    Oral Care Status:    [] Oral Care [facility]  [] Poor oral care status  [] Edentulous  [x] Upper Dentures  [] Lower Dentures  [x] Missing/Broken Teeth  [] Evidence of dental cavities/carries    PO trials:   Ice: disorganized oral phase and mastication; delayed swallow initiation; no overt s/s associated with aspiration    IDDSI 0 (thin):   - 5cc bolus (tsp): no anterior bolus loss , suspect reduced/impaired A-P bolus transit, suspect premature bolus loss into pharynx, suspect delayed swallow onset, no clinical s/s of aspiration, no coughing, dry vocal quality, and no throat clearing. Open mouth posture with max cues to seal lips around the tsp.     - Cup: no anterior bolus loss , suspect reduced/impaired A-P bolus transit, suspect premature bolus loss into pharynx, suspect delayed swallow onset, prolonged oral phase, delayed cough as trials continued , wet breath sounds upon inhalation, and wet breath sounds upon exhalation. Open mouth posture with max cues to seal lips around the cup.    - Straw: no anterior bolus loss , suspect reduced/impaired A-P bolus transit, suspect premature bolus loss into pharynx, suspect delayed swallow onset, coughing after the swallow, and increased WOB     IDDSI 2 (mildly thick): DNT    IDDSI 3 (moderately thick): DNT    IDDSI 4 (puree): no anterior bolus loss , suspect reduced/impaired A-P bolus transit, suspect premature bolus loss into pharynx, suspect delayed swallow onset, prolonged oral phase, lingual pumping, lingual mashing, oral stasis noted post swallow, delayed cough as trials continued, increased WOB as trials continued, wet breath sounds upon inhalation as trials continued, and wet breath sounds upon exhalation as trials continued. Lingual protrusion when given puree off the tsp. IDDSI 5 (minced and moist): Not clinically indicated     IDDSI 6 (soft and bite sized): Not clinically indicated     IDDSI 7 (regular): Not clinically indicated     3 oz water: DNT    Impressions:    Clinical signs of oropharyngeal dysphagia likely acute related to reduced physical mobility, respiratory illness, AMS, and co-morbidities. Swallow prognosis is guarded. Instrumental swallow study is indicated at this time when MONICA improves. Given tolerance to PO at bedside, reduced physical mobility, and overt clinical s/s of aspiration at bedside, pt is not safe for oral diet at this time. Suspect cognition negatively impacting swallow function. Instrumentation: Yes.  MBSS is warranted to further assess oropharyngeal structures and function when MONICA improves  Diet recommendation: NPO; Ice chips with SLP/RN only; Meds via alt means of nutrition  ICE CHIPS ONLY WHEN FULLY AWAKE/ ALERT  Risk management: routine oral care, total feed, increase physical mobility as able, general GERD precautions, general aspiration precautions, and hold PO and contact SLP if s/s of aspiration or worsening respiratory status develop.     Prognosis: Guarded    Recommended Intervention:  [x] Dysphagia tx  [] Videostroboscopy                      [x] NPO   [x] MBS       [] Speech/Cog Eval    [x] Therapeutic PO Trials     [x] Ice Chips   [] Other:  [] FEES                                                 Dysphagia Therapeutic Intervention:  []  Bolus control Exercises  []  Oral Motor Exercises  []  Plunkett Water Protocol  []  Thermal Stimulation  [x]  Oral Care    []  Vital Stim/NMES  []  Laryngeal Exercises  [x]  Patient/Family Education  []  Pharyngeal Exercises  [x]  Therapeutic PO trials with SLP  [x]  Diet tolerance monitoring  []  Other:     Referrals:  [] ENT    [] PT  [] Pulmonology [] GI  [] Neurology  [] RD  [] OT   []     Goals:  Short Term Goals:  Timeframe for Short Term Goals: (5 days 3/3/23)  Goal 1: The patient will tolerate therapeutic diet upgrade trials with no clinical s/s of aspiration 5/5  Goal 2: The patient/caregiver will demonstrate understanding of compensatory swallow strategies, for improved swallow safety  Goal 3: The patient will tolerate repeat BSE when able for ongoing assessment  Goal 4: The patient will tolerate instrumental assessment when able     Long Term Goals:   Timeframe for Long Term Goals: (7 days 3/5/23)  Goal 1: The patient will tolerate least restrictive diet with no clinical s/s of aspiration or worsening respiratory/pulmonary status    Treatment:  Skilled instruction completed with patient re: evidenced based practice regarding recommendations and POC, importance of oral care to reduce adverse affects in the event of aspiration, and instruction of recommended compensatory strategies developed based upon clinical exam. Pt able to recall/demonstrate compensatory strategies with max cues. Pt Education: SLP educated the patient re: Role of SLP, rationale for completion of assessment, results of assessment, and recommendations  Pt Education Response: no evidence of learning, would benefit from ongoing education, and RN aware    Duration/Frequency of Tx: 3-5x/wk    Individuals Consulted:   [x]  Patient     []  NP         [x]  RN   []  RD                   []  MD      []  Family Member                        []  PA    []  Other:      Safety Devices / Report:  [x]  All fall risk precautions in place []  Safety handoff completed with RN  [x]  Bed alarm in place  []  Left in bed     []  Chair alarm in place  []  Left in chair   [x]  Call light in reach   []  Other:                   Total Treatment Time / Charges       Time in Time out Total Time / units   Swallow Eval/Tx Time  0815 0841 26 minutes/ 2 units     Signature:  Marina Del Rio MA. CF- SLP  Speech Language Pathologist  [id number]

## 2023-02-27 NOTE — CARE COORDINATION
Case Management Assessment  Initial Evaluation    Date/Time of Evaluation: 2/27/2023 1:45 PM  Assessment Completed by: Pauline Haider RN    If patient is discharged prior to next notation, then this note serves as note for discharge by case management. Patient Name: Benjamín Tellez                   YOB: 1940  Diagnosis: Acute respiratory failure with hypoxia [facility]) [J96.01]                   Date / Time: 2/25/2023  4:43 PM    Patient Admission Status: Inpatient   Readmission Risk (Low < 19, Mod (19-27), High > 27): Readmission Risk Score: 11.9    Current PCP: Brice Reid  PCP verified by CM? Yes    Chart Reviewed: Yes      History Provided by: Child/Family  Patient Orientation: Unable to Assess    Patient Cognition: Dementia / Early Alzheimer's    Hospitalization in the last 30 days (Readmission):  No    If yes, Readmission Assessment in  Navigator will be completed.     Advance Directives:      Code Status: DNR-CC   Patient's Primary Decision Maker is: Legal Next of Kin    Primary Decision Maker: Yosef herrera - Child - [phone]    Discharge Planning:    Patient lives with: (P) Spouse/Significant Other Type of Home: (P) House  Primary Care Giver: Spouse  Patient Support Systems include: Spouse/Significant Other, Children   Current Financial resources: (P) Medicare  Current community resources: (P) ECF/Home Care  Current services prior to admission: (P) None            Current DME:              Type of Home Care services:  (P) None    ADLS  Prior functional level: (P) Assistance with the following:, Bathing, Dressing, Toileting, Other (see comment) (incontinent of urine)  Current functional level: (P) Bathing, Dressing, Toileting, Assistance with the following:, Other (see comment) (incontinenmt of urine)    PT AM-PAC:   /24  OT AM-PAC:   /24    Family can provide assistance at DC: (P) No (wife cannot keep up with his care)  Would you like Case Management to discuss the discharge plan with any other family members/significant others, and if so, who? (P) Yes (wife and daughter are next of kin)  Plans to Return to Present Housing: (P) No (need memory care- now wandering from home)  Other Identified Issues/Barriers to RETURNING to current housing: no- wandering/dementia/safety concerns  Potential Assistance needed at discharge: (P) Extended Care Placement            Potential DME:    Patient expects to discharge to: (P) Long-term care (memory care unit)  Plan for transportation at discharge:      Financial    Payor: Rosa Lu / Plan: MEDICARE PART A AND B / Product Type: *No Product type* /     Does insurance require precert for SNF: Yes    Potential assistance Purchasing Medications: (P) No  Meds-to-Beds request:      No Pharmacies Listed    Notes:    Factors facilitating achievement of predicted outcomes: Family support    Barriers to discharge: Cognitive deficit, Limited insight into deficits, and Communication deficit    Additional Case Management Notes: Chart reviewed. Met with pt dementia (confusion) Plan: Family wishes for LTC placement at [place] [address] in the memory care unit. Ongoing h/o dementia now starting to wander from home. Referral called and faxed to [address] [place] [address]. +CM following      The Plan for Transition of Care is related to the following treatment goals of Acute respiratory failure with hypoxia ([address] [U51.72]    IF APPLICABLE: The Patient and/or patient representative Melvi Montgomery and his family were provided with a choice of provider and agrees with the discharge plan. Freedom of choice list with basic dialogue that supports the patient's individualized plan of care/goals and shares the quality data associated with the providers was provided to: (P) Patient Representative   Patient Representative Name: (P) daughterMikal Able     The Patient and/or Patient Representative Agree with the Discharge Plan?  (P) Yes    Charity Britt RN  Case Management Department  Ph: [phone]

## 2023-02-27 NOTE — PROGRESS NOTES
Speech Language Pathology  Dysphagia Treatment/Follow-Up Note  [facility]    Recommendations: SLP recommends to advance to IDDSI 4 Puree Solids; IDDSI 0 Thin Liquids - straws OK; Meds crushed in puree as able  Risk Management: upright for all intake, stay upright for at least 30 mins after intake, small bites/sips, assist feed, downgrade to mildly thick if s/s of aspiration develop, oral care q4 hrs to reduce adverse affects in the event of aspiration, increase physical mobility as able, consider removing bilateral wrist restraints during meals to enhance pt's ability to self-feed if able, alternate bites/sips, slow rate of intake, general GERD precautions, general aspiration precautions, and hold PO and contact SLP if s/s of aspiration or worsening respiratory status develop. Delores Holden  : 1940 (82 y.o.)   MRN: [id number]  ROOM: [place] [address]  ADMISSION DATE: 2023  PATIENT DIAGNOSIS(ES): Acute respiratory failure with hypoxia (HCC) [J96.01]  No Known Allergies    DATE ONSET: 2023    Pain: The patient does not complain of pain     Current Diet: Diet NPO    Diet Tolerance:  Pt currently NPO. Dysphagia Treatment and Impressions:  Subjective: Pt seen in room at bedside with RN permission. Family present at bedside. RN reports family gave pt PO and no coughing; requesting SLP to re assess with family at bedside. RN Report/Chart Review: Pt NPO. Patient tolerance: Pt NPO.      Respiratory Status: Pt on RA with stable O2 sat    Liquid PO Trials:   IDDSI 0 Thin:  Assessed via straw: no anterior bolus loss , suspect reduced/impaired A-P bolus transit, suspect premature bolus loss into pharynx, suspect delayed swallow onset, and no clinical s/s of aspiration    Solid PO Trials  IDDSI 4 Puree:   no anterior bolus loss , suspect reduced/impaired A-P bolus transit, suspect premature bolus loss into pharynx, suspect delayed swallow onset, oral clearance grossly WFL, no clinical s/s of aspiration, and no coughing    Education: SLP edu pt re: Role of SLP, Rationale for dysphagia tx, Recommended compensatory strategies, Aspiration precautions, and Importance of oral care to reduce adverse affects in the event of aspiration. Pt would benefit from ongoing education and RN aware of recommendations  Assessment: Pt NPO current diet with no clinical s/s of aspiration. Poor carryover of recommended compensatory strategies    Recommendations: SLP recommends to advance to IDDSI 4 Puree Solids; IDDSI 0 Thin Liquids - straws OK; Meds crushed in puree as able  Risk Management: upright for all intake, stay upright for at least 30 mins after intake, small bites/sips, assist feed, downgrade to mildly thick if s/s of aspiration develop, oral care q4 hrs to reduce adverse affects in the event of aspiration, increase physical mobility as able, consider removing bilateral wrist restraints during meals to enhance pt's ability to self-feed if able, alternate bites/sips, slow rate of intake, general GERD precautions, general aspiration precautions, and hold PO and contact SLP if s/s of aspiration or worsening respiratory status develop. Tri Ruiz      Dysphagia Goals:  Timeframe for Long-term Goals: (7 days 3/5/23)  Goal 1: The patient will tolerate least restrictive diet with no clinical s/s of aspiration or worsening respiratory/pulmonary status 2/27 ongoing     Short-term Goals  Timeframe for Short-term Goals: (5 days 3/3/23)  Goal 1: The patient will tolerate therapeutic diet upgrade trials with no clinical s/s of aspiration 5/5 2/27 ongoing; limited PO accepted  Goal 2: The patient/caregiver will demonstrate understanding of compensatory swallow strategies, for improved swallow safety 2/27 ongoing; ongoing education required  Goal 3: The patient will tolerate repeat BSE when able for ongoing assessment 2/27 ongoing; limited tolerance of PO acceptance  Goal 4: The patient will tolerate instrumental assessment when able 2/27 ongoing; not appropriate this date     Speech/Language/Cog Goals: N/A     Recommendations:  Solid Consistency: IDDSI 4 Puree Solids  Liquid Consistency: IDDSI 0 Thin Liquids - straws OK  Medication: Meds crushed in puree as able    Plan:    Continued Dysphagia treatment with goals per plan of care. Discharge Recommendations: TBD    If pt discharges from hospital prior to Speech/Swallowing discharge, this note serves as tx and discharge summary.      Total Treatment Time / Charges     Time in Time out Total Time / units   Cognitive Tx         Speech Tx      Dysphagia Tx 1337 1355 18 minutes/ 1 unit     Signature:  Betty Munoz MA. CF- SLP  Speech Language Pathologist  [id number]

## 2023-02-27 NOTE — PROGRESS NOTES
Speech Language Pathology  Dysphagia Treatment/Follow-Up Note  Facility/Department[facility]    Diet recommendation: NPO; Ice chips with SLP/RN only; Meds via alt means of nutrition  ICE CHIPS ONLY WHEN FULLY AWAKE/ ALERT  Risk management: routine oral care, total feed, increase physical mobility as able, general GERD precautions, general aspiration precautions, and hold PO and contact SLP if s/s of aspiration or worsening respiratory status develop. Maria M Salinas  : 1940 (82 y.o.)   MRN: [id number]  ROOM: [place] [address]  ADMISSION DATE: 2023  PATIENT DIAGNOSIS(ES): Acute respiratory failure with hypoxia (HCC) [J96.01]  No Known Allergies    DATE ONSET: 2023    Pain: The patient does not complain of pain     Current Diet: Diet NPO    Diet Tolerance:  Pt currently NPO. Dysphagia Treatment and Impressions:  Subjective: Pt seen in room at bedside with RN permission. Dr wanted pt re evaled reporting suspected pt at baseline cognitive function; Dr present for portion of tx session; RN present during session; Pt in restraints and combative. RN Report/Chart Review: Pt currently NPO  Patient tolerance: Pt currently NPO    Respiratory Status: Pt on RA with stable O2 sat    Liquid PO Trials:   IDDSI 0 Thin:  Assessed via ice chips x1 accepted: spitting out half remainder of ice chip trial; anterior bolus loss, suspect reduced/impaired A-P bolus transit, suspect premature bolus loss into pharynx, suspect delayed swallow onset, disorganized oral phase, lingual mashing and pumping, disorganized mastication, no overt s/s associated with aspiration. Pt not appropriate for additional PO trials. Solid PO Trials  IDDSI 4 Puree:   Attempted puree via tsp and pt turned head away; lingual protrusion x2 attempting to push tsp away from mouth. Pt not appropriate for additional PO trials.     Education: SLP edu pt re: Role of SLP, Rationale for dysphagia tx, Recommended compensatory strategies, Aspiration precautions, Rationale for NPO status, and Importance of oral care to reduce adverse affects in the event of aspiration. Pt would benefit from ongoing education, no evidence of learning, and RN aware of recommendations  Assessment: Pt NPO with no clinical s/s of aspiration. Poor carryover of recommended compensatory strategies    Recommendations: SLP recommends to continue with NPO; Ice chips with SLP/RN only; Meds via alt means of nutrition  Risk Management: routine oral care, total feed, increase physical mobility as able, general GERD precautions, general aspiration precautions, and hold PO and contact SLP if s/s of aspiration or worsening respiratory status develop. Dysphagia Goals:  Timeframe for Long-term Goals: (7 days 3/5/23)  Goal 1: The patient will tolerate least restrictive diet with no clinical s/s of aspiration or worsening respiratory/pulmonary status 2/27 ongoing     Short-term Goals  Timeframe for Short-term Goals: (5 days 3/3/23)  Goal 1: The patient will tolerate therapeutic diet upgrade trials with no clinical s/s of aspiration 5/5 2/27 ongoing; limited PO accepted  Goal 2: The patient/caregiver will demonstrate understanding of compensatory swallow strategies, for improved swallow safety 2/27 ongoing; ongoing education required  Goal 3: The patient will tolerate repeat BSE when able for ongoing assessment 2/27 ongoing; limited tolerance of PO acceptance  Goal 4: The patient will tolerate instrumental assessment when able 2/27 ongoing; not appropriate this date       Speech/Language/Cog Goals: N/A       Recommendations:  Solid Consistency: NPO  Liquid Consistency: Ice chips with SLP/RN only  Medication: Meds via alt means of nutrition    Plan:    Continued Dysphagia treatment with goals per plan of care. Discharge Recommendations: TBD    If pt discharges from hospital prior to Speech/Swallowing discharge, this note serves as tx and discharge summary.      Total Treatment Time / Charges     Time in Time out Total Time / units   Cognitive Tx         Speech Tx      Dysphagia Tx 1115 1125 10 minutes/ 1 unit     Signature:  Nina Landa MA. CF- SLP  Speech Language Pathologist  [id number]

## 2023-02-27 NOTE — PROGRESS NOTES
AM assessment completed. Scheduled medications given per MAR. VSS room air, Patient confused in bilateral wrist restraint and lap restraint for patients safety. Patient does not appear in any distress, tele sitter in room. Call light in reach, will monitor, bed alarm on.

## 2023-02-27 NOTE — PROGRESS NOTES
Called daughter Saleem Watson, update given, advised several relaxation and distraction strategies were used prior to restraints and will continue to monitor patient for behaviors and will remove restraints as early as possible

## 2023-02-27 NOTE — PROGRESS NOTES
Patient oriented to name, multiple bed alarms throughout the night, patient with ability to move legs over the edge of the bed, hourly moisturizer for mouth care, range of motion hourly with restraints off   Report given to Susanna

## 2023-02-27 NOTE — ACP (ADVANCE CARE PLANNING)
Advance Care Planning     General Advance Care Planning (ACP) Conversation    Date of Conversation: 2/25/2023  Conducted with:  Healthcare Decision Maker: Next of Kin by law (only applies in absence of above) (name) Angela [facility] Decision Maker:    Primary Decision Maker: Amberly Portillo - Jojo - [phone]  Click here to complete Healthcare Decision Makers including selection of the Healthcare Decision Maker Relationship (ie \"Primary\"). Today we documented Decision Maker(s) consistent with Legal Next of Kin hierarchy.     Content/Action Overview:  CODE status addressed by Dr. Coletta Simmonds with Karen Avila this am  Reviewed DNR/DNI and patient   DNR-CC  Length of Voluntary ACP Conversation in minutes:  <16 minutes (Non-Billable)    Ziyad Johnson RN

## 2023-02-27 NOTE — PROGRESS NOTES
Patient is trying to get out of bed, pulling at lines, even with telesitter, patient is at risk for falling as he is quick and able to put both legs out of bed. Patient placed in bilateral wrist restraints and alisha belt. Dr. Kamryn Cody notified.   Patiet no longer on telemtry monitoring but is on continuous pulse ox

## 2023-02-27 NOTE — PROGRESS NOTES
Progress Note    Admit Date:  2/25/2023    -pmhx of dementia    Transferred from psychiatry for confusion and hypoxia, abn troponin    Subjective:  Mr. Park Fortune has increasing agitation since last night needing restraints   Pt seen up in bed, awake, alert and confused with baseline dementia  Denies any pain   Unable to follow commands     Remains on RA      Objective:   Patient Vitals for the past 4 hrs:   SpO2 Weight   02/27/23 0645 97 % 126 lb 14.4 oz (57.6 kg)   02/27/23 0600 -- 126 lb 14.4 oz (57.6 kg)            Intake/Output Summary (Last 24 hours) at 2/27/2023 0745  Last data filed at 2/27/2023 0615  Gross per 24 hour   Intake 0 ml   Output 300 ml   Net -300 ml         Physical Exam:    General:  elderly male, confused and awake, alert   Mucous Membranes:  Pink , anicteric  Neck: No JVD, no carotid bruit, no thyromegaly  Chest:  Clear to auscultation bilaterally, diminished in bases  Cardiovascular:  RRR S1S2 heard, no murmurs or gallops  Abdomen:  Soft, undistended, non tender, no organomegaly, BS present  Extremities: No edema or cyanosis.  Distal pulses well felt  Neurological : non focal but very confused , improving alertness              Medications:  divalproex, 250 mg, BID  [Held by provider] vitamin E, 400 Units, Daily  [Held by provider] vitamin B-12, 500 mcg, Daily  [Held by provider] therapeutic multivitamin-minerals, 1 tablet, Daily with breakfast  [Held by provider] losartan, 100 mg, Daily  [Held by provider] donepezil, 10 mg, Nightly  atorvastatin, 40 mg, Nightly  aspirin, 81 mg, Daily  amLODIPine, 2.5 mg, Nightly  sodium chloride flush, 5-40 mL, 2 times per day  enoxaparin, 40 mg, Daily  insulin lispro, 0-4 Units, Q4H  ampicillin-sulbactam, 3,000 mg, Q6H    PRN Medications:  perflutren lipid microspheres, 1.5 mL, ONCE PRN  albuterol, 2.5 mg, Q6H PRN  OLANZapine, 5 mg, Q8H PRN   Or  OLANZapine (ZyPREXA) in sterile water IntraMUSCular, 5 mg, Q8H PRN  hydrOXYzine HCl, 50 mg, TID PRN  benztropine mesylate, 2 mg, BID PRN  aluminum & magnesium hydroxide-simethicone, 30 mL, Q6H PRN  sodium chloride flush, 10 mL, PRN  sodium chloride, , PRN  potassium chloride, 40 mEq, PRN   Or  potassium alternative oral replacement, 40 mEq, PRN   Or  potassium chloride, 10 mEq, PRN  magnesium sulfate, 1,000 mg, PRN  ondansetron, 4 mg, Q8H PRN   Or  ondansetron, 4 mg, Q6H PRN  polyethylene glycol, 17 g, Daily PRN  acetaminophen, 650 mg, Q6H PRN   Or  acetaminophen, 650 mg, Q6H PRN  glucose, 4 tablet, PRN  dextrose bolus, 125 mL, PRN   Or  dextrose bolus, 250 mL, PRN  glucagon (rDNA), 1 mg, PRN  dextrose, , Continuous PRN  labetalol, 10 mg, Q6H PRN        Data:  CBC:   Recent Labs     02/25/23  1722 02/26/23  0503 02/27/23  0523   WBC 12.5* 13.2* 11.0   HGB 15.5 13.9 14.3   HCT 47.1 42.4 41.8   MCV 93.7 92.7 91.2   PLT 80* 86* 79*       BMP:   Recent Labs     02/25/23  1425 02/26/23  0503 02/27/23  0523    145 144   K 3.8 4.7 3.7    105 106   CO2 26 30 30   BUN 23* 28* 27*   CREATININE 1.4* 1.4* 1.1       LIVER PROFILE:   Recent Labs     02/24/23  1939   AST 21   ALT 19   BILITOT 0.4   ALKPHOS 69       PT/INR: No results for input(s): PROTIME, INR in the last 72 hours. CULTURES  COVID and Flu not detected   Sputum culture pending      RADIOLOGY  XR CHEST PORTABLE   Final Result   Bands of opacity in the lower lungs bilaterally, new when compared to the   previous exam.  These are probably atelectasis, but pneumonia or aspiration   remains a differential.           CT chest 2/26/23    1. Dense mucous plugging within both lower lobes, with partial consolidation   of both lower lobes, which could represent either aspiration or pneumonia. 2. Cholelithiasis.        Assessment/Plan:    #Acute encephalopathy - likely toxic with meds  -2/2 to medication given on adm, Given ativan and geodon in ED  -holding depakote and other sedatives now with severe drowsiness  - improved drowsiness but remains agitated with baseline dementia issues  - resume depakote at lower dose and increase slowly   -had negative CT head in ED   - add seroquel bid low dose   - psychiatry following     #Acute hypoxic respiratory failure   - sec to atelectasis vs aspiration pneumonia   -requiring 5 L O2 while sleeping initially     -CXR was neg in ED   --CT chest now with dense mucus plugging, consolidations possible aspiration or pna   -respiratory team were able to suction significant amount of mucus out   -IV unasyn started   -procalc, LA  elevated   -sputum culture pending  -NPO now, swallow eval when awake  -weaned to RA today   -pulm consulted      #Alzheimers dementia with behavioral disturbances   - per psychiatry team  - holding psych meds for drowsiness- resume depakote and add seroquel    #Elevated Cr   -Cr 1.4 today  -improved with IVF      #Elevated troponin   0.13--> 0.11 >0.09  -EKG without acute ischemic changes   -on ASA, statin  -cardiology consulted and no plans for workup given severe agitation issues     #CAD   -s/p CABG   -on ASA, statin      #HTN   -on losartan and norvasc  resume as needed  -IV prn labetalol      #DM type 2   -holding oral regimen   -monitor BG    -SSI        DVT Prophylaxis: Lovenox   Diet: Diet NPO  Code Status: Full Code    Start SLP , start PT      Leopold Covert, MD,2/27/2023 7:45 AM

## 2023-02-27 NOTE — PLAN OF CARE
Problem: Discharge Planning  Goal: Discharge to home or other facility with appropriate resources  Outcome: Progressing     Problem: Pain  Goal: Verbalizes/displays adequate comfort level or baseline comfort level  Outcome: Progressing     Problem: Chronic Conditions and Co-morbidities  Goal: Patient's chronic conditions and co-morbidity symptoms are monitored and maintained or improved  Outcome: Progressing  Flowsheets (Taken 2/26/2023 2017 by Ira Avelar RN)  Care Plan - Patient's Chronic Conditions and Co-Morbidity Symptoms are Monitored and Maintained or Improved:   Collaborate with multidisciplinary team to address chronic and comorbid conditions and prevent exacerbation or deterioration   Monitor and assess patient's chronic conditions and comorbid symptoms for stability, deterioration, or improvement     Problem: Skin/Tissue Integrity  Goal: Absence of new skin breakdown  Description: 1. Monitor for areas of redness and/or skin breakdown  2. Assess vascular access sites hourly  3. Every 4-6 hours minimum:  Change oxygen saturation probe site  4. Every 4-6 hours:  If on nasal continuous positive airway pressure, respiratory therapy assess nares and determine need for appliance change or resting period. Outcome: Progressing     Problem: Safety - Adult  Goal: Free from fall injury  Outcome: Progressing     Problem: Safety - Medical Restraint  Goal: Remains free of injury from restraints (Restraint for Interference with Medical Device)  Description: INTERVENTIONS:  1. Determine that other, less restrictive measures have been tried or would not be effective before applying the restraint  2. Evaluate the patient's condition at the time of restraint application  3. Inform patient/family regarding the reason for restraint  4.  Q2H: Monitor safety, psychosocial status, comfort, nutrition and hydration  Outcome: Progressing  Flowsheets  Taken 2/27/2023 0600 by Ira Avelar RN  Remains free of injury from restraints (restraint for interference with medical device):   Determine that other, less restrictive measures have been tried or would not be effective before applying the restraint   Every 2 hours: Monitor safety, psychosocial status, comfort, nutrition and hydration   Inform patient/family regarding the reason for restraint   Evaluate the patient's condition at the time of restraint application  Taken 5/40/4615 0021 by Harvey Jimenez RN  Remains free of injury from restraints (restraint for interference with medical device):   Determine that other, less restrictive measures have been tried or would not be effective before applying the restraint   Every 2 hours: Monitor safety, psychosocial status, comfort, nutrition and hydration     Problem: Confusion  Goal: Confusion, delirium, dementia, or psychosis is improved or at baseline  Description: INTERVENTIONS:  1. Assess for possible contributors to thought disturbance, including medications, impaired vision or hearing, underlying metabolic abnormalities, dehydration, psychiatric diagnoses, and notify attending LIP  2. [place] high risk fall precautions, as indicated  3. Provide frequent short contacts to provide reality reorientation, refocusing and direction  4. Decrease environmental stimuli, including noise as appropriate  5. Monitor and intervene to maintain adequate nutrition, hydration, elimination, sleep and activity  6. If unable to ensure safety without constant attention obtain sitter and review sitter guidelines with assigned personnel  7.  Initiate Psychosocial CNS and Spiritual Care consult, as indicated  Outcome: Progressing

## 2023-02-27 NOTE — CONSULTS
Department of Psychiatry  AttendingProgress Note  Chief Complaint: dementia with behavioral disturbance. Met with Eliceo's sister and daughter today. Discussed his dementia as this has worsened over the past 6 months. He was taken to  last month with a r/o CVA. He was placed on Depakote which family believes made him more aggressive. Sister stated that he is not safe at home as he leaves the home and wife is unable to manage him . Daughter was somewhat resistant to [facility] and possible placement in a memory unit. She was visibly upset about his deterioration. Jenna Peña was asleep when I visited. Had Haldol earlier IM for agitation . IN soft restraints  Discussed transfer to [facility] and Dr. Meade Spatz is planning to keep patient and treat medically  Patient's chart was reviewed and collaborated with  about the treatment plan. SUBJECTIVE:    Patient is feeling unchanged. Suicidal ideation:  ASHLEY. Patient  ASHLEY  medication side effects. ROS: Patient has new complaints: no  Sleeping adequately:  Yes   Appetite adequate: No:   Attending groups: no  Visitors:Yes    OBJECTIVE    Physical  VITALS:  BP (!) 149/70   Pulse 59   Temp (!) 96.6 °F (35.9 °C) (Axillary)   Resp 18   Ht 5' 4\" (1.626 m)   Wt 126 lb 14.4 oz (57.6 kg)   SpO2 96%   BMI 21.78 kg/m²     Mental Status Examination:  Patients appearance was ill-appearing. Thoughts are  askleep . Homicidal ideations ASHLEY. No abnormal movements, tics or mannerisms. Memory ASHLEY Aims 0. Concentration Unable to Assess. Alert and oriented X 4. Insight and Judgement ASHLEY. Patient was asleep. Patient gait in  bed.  Mood asleep, affect ASHLEY Hallucinations ASHLEY, suicidal ideations specific plan to harm self:  Speech [id number]  Data  Labs:   Admission on 02/25/2023   Component Date Value Ref Range Status    Ventricular Rate 02/25/2023 96  BPM Final    Atrial Rate 02/25/2023 96  BPM Final    P-R Interval 02/25/2023 150  ms Final    QRS Duration 02/25/2023 78  ms Final    Q-T Interval 02/25/2023 382  ms Final    QTc Calculation (Bazett) 02/25/2023 482  ms Final    P Axis 02/25/2023 41  degrees Final    R Axis 02/25/2023 -30  degrees Final    T [place] 02/25/2023 72  degrees Final    Diagnosis 02/25/2023 Normal sinus rhythmLeft axis deviationNonspecific T wave abnormalityAbnormal ECGWhen compared with ECG of 24-FEB-2023 19:24,Significant changes have occurredConfirmed by Amy Moreau MD, [address] (1984) on 2/26/2023 8:05:21 AM   Final    Procalcitonin 02/25/2023 1.58 (A)  0.00 - 0.15 ng/mL Final    Comment: Suspected Sepsis:  Low likelihood of sepsis  <.50 ng/mL    Increased likelihood of sepsis 0.50-2.00 ng/mL  Antibiotics encouraged    High risk of sepsis/shock   >2.00 ng/mL  Antibiotics strongly encouraged    Suspected Lower Respiratory Tract Infections:  Low likelihood of bacterial infection  <0.24 ng/mL    Increased likelihood of bacterial infection >0.24 ng/mL  Antibiotics encouraged    With successful antibiotic therapy, PCT levels should decrease  rapidly. (Half-life of 24 to 36 hours.)    Procalcitonin values from samples collected within the first  6 hours of systemic infection may still be low. Retesting may be indicated. Values from day 1 and day 4 can be entered into the Change in  Procalcitonin Calculator to determine the patient's  Mortality Risk Prognosis  ([email or web address])    In healthy neonates, plasma Procalcitonin (PCT) concentrations  increase gradually after birth, reaching peak values at about  24 hours of age then decrease to normal values below 0.5                            ng/mL  by 48-72 hours of age.       pH, Arterial 02/25/2023 7.408  7.350 - 7.450 Final    pCO2, Arterial 02/25/2023 47.5 (A)  35.0 - 45.0 mmHg Final    pO2, Arterial 02/25/2023 29.0 (A)  75.0 - 108.0 mmHg Final    HCO3, Arterial 02/25/2023 29.3 (A)  21.0 - 29.0 mmol/L Final    Base Excess, Arterial 02/25/2023 3.7 (A)  -3.0 - 3.0 mmol/L Final    Hemoglobin, Art, Extended 02/25/2023 16.3 13.5 - 17.5 g/dL Final    O2 Sat, Arterial 02/25/2023 54.8 (A)  >92 % Final    Carboxyhgb, Arterial 02/25/2023 1.0  0.0 - 1.5 % Final    Comment:      0.0-1.5  (Smokers 1.5-5.0)      Methemoglobin, Arterial 02/25/2023 0.0  <1.5 % Final    TCO2, Arterial 02/25/2023 30.8  Not Established mmol/L Final    O2 Therapy 02/25/2023 Unknown   Final    Hemoglobin A1C 02/25/2023 6.5  See comment % Final    Comment: Comment:  Diagnosis of Diabetes: > or = 6.5%  Increased risk of diabetes (Prediabetes): 5.7-6.4%  Glycemic Control: Nonpregnant Adults: <7.0%                    Pregnant: <6.0%        eAG 02/25/2023 139.9  mg/dL Final    Sodium 02/25/2023 143  136 - 145 mmol/L Final    Potassium reflex Magnesium 02/25/2023 3.8  3.5 - 5.1 mmol/L Final    Chloride 02/25/2023 103  99 - 110 mmol/L Final    CO2 02/25/2023 26  21 - 32 mmol/L Final    Anion Gap 02/25/2023 14  3 - 16 Final    Glucose 02/25/2023 130 (A)  70 - 99 mg/dL Final    BUN 02/25/2023 23 (A)  7 - 20 mg/dL Final    Creatinine 02/25/2023 1.4 (A)  0.8 - 1.3 mg/dL Final    Est, Glom Filt Rate 02/25/2023 50 (A)  >60 Final    Comment: Pediatric calculator link  [email or web address]  Effective Oct 3, 2022  These results are not intended for use in patients  <25years of age. eGFR results are calculated without  a race factor using the 2021 CKD-EPI equation. Careful  clinical correlation is recommended, particularly when  comparing to results calculated using previous equations. The CKD-EPI equation is less accurate in patients with  extremes of muscle mass, extra-renal metabolism of  creatinine, excessive creatinine ingestion, or following  therapy that affects renal tubular secretion.       Calcium 02/25/2023 9.1  8.3 - 10.6 mg/dL Final    WBC 02/25/2023 12.5 (A)  4.0 - 11.0 K/uL Final    RBC 02/25/2023 5.02  4.20 - 5.90 M/uL Final    Hemoglobin 02/25/2023 15.5  13.5 - 17.5 g/dL Final    Hematocrit 02/25/2023 47.1  40.5 - 52.5 % Final    MCV 02/25/2023 93.7  80.0 - 100.0 fL Final    MCH 02/25/2023 30.8  26.0 - 34.0 pg Final    MCHC 02/25/2023 32.9  31.0 - 36.0 g/dL Final    RDW 02/25/2023 14.8  12.4 - 15.4 % Final    Platelets 50/67/7326 80 (A)  135 - 450 K/uL Final    MPV 02/25/2023 7.5  5.0 - 10.5 fL Final    Neutrophils % 02/25/2023 85.9  % Final    Lymphocytes % 02/25/2023 3.4  % Final    Monocytes % 02/25/2023 10.6  % Final    Eosinophils % 02/25/2023 0.0  % Final    Basophils % 02/25/2023 0.1  % Final    Neutrophils Absolute 02/25/2023 10.7 (A)  1.7 - 7.7 K/uL Final    Lymphocytes Absolute 02/25/2023 0.4 (A)  1.0 - 5.1 K/uL Final    Monocytes Absolute 02/25/2023 1.3  0.0 - 1.3 K/uL Final    Eosinophils Absolute 02/25/2023 0.0  0.0 - 0.6 K/uL Final    Basophils Absolute 02/25/2023 0.0  0.0 - 0.2 K/uL Final    Lactic Acid 02/25/2023 2.6 (A)  0.4 - 2.0 mmol/L Final    WBC 02/26/2023 13.2 (A)  4.0 - 11.0 K/uL Final    RBC 02/26/2023 4.57  4.20 - 5.90 M/uL Final    Hemoglobin 02/26/2023 13.9  13.5 - 17.5 g/dL Final    Hematocrit 02/26/2023 42.4  40.5 - 52.5 % Final    MCV 02/26/2023 92.7  80.0 - 100.0 fL Final    MCH 02/26/2023 30.4  26.0 - 34.0 pg Final    MCHC 02/26/2023 32.8  31.0 - 36.0 g/dL Final    RDW 02/26/2023 14.5  12.4 - 15.4 % Final    Platelets 34/28/5795 86 (A)  135 - 450 K/uL Final    MPV 02/26/2023 7.5  5.0 - 10.5 fL Final    Neutrophils % 02/26/2023 84.5  % Final    Lymphocytes % 02/26/2023 6.3  % Final    Monocytes % 02/26/2023 8.9  % Final    Eosinophils % 02/26/2023 0.0  % Final    Basophils % 02/26/2023 0.3  % Final    Neutrophils Absolute 02/26/2023 11.2 (A)  1.7 - 7.7 K/uL Final    Lymphocytes Absolute 02/26/2023 0.8 (A)  1.0 - 5.1 K/uL Final    Monocytes Absolute 02/26/2023 1.2  0.0 - 1.3 K/uL Final    Eosinophils Absolute 02/26/2023 0.0  0.0 - 0.6 K/uL Final    Basophils Absolute 02/26/2023 0.0  0.0 - 0.2 K/uL Final    Sodium 02/26/2023 145  136 - 145 mmol/L Final    Potassium reflex Magnesium 02/26/2023 4.7  3.5 - 5.1 mmol/L Final    Chloride 02/26/2023 105  99 - 110 mmol/L Final    CO2 02/26/2023 30  21 - 32 mmol/L Final    Anion Gap 02/26/2023 10  3 - 16 Final    Glucose 02/26/2023 100 (A)  70 - 99 mg/dL Final    BUN 02/26/2023 28 (A)  7 - 20 mg/dL Final    Creatinine 02/26/2023 1.4 (A)  0.8 - 1.3 mg/dL Final    Est, Glom Filt Rate 02/26/2023 50 (A)  >60 Final    Comment: Pediatric calculator link  [email or web address]  Effective Oct 3, 2022  These results are not intended for use in patients  <25years of age. eGFR results are calculated without  a race factor using the 2021 CKD-EPI equation. Careful  clinical correlation is recommended, particularly when  comparing to results calculated using previous equations. The CKD-EPI equation is less accurate in patients with  extremes of muscle mass, extra-renal metabolism of  creatinine, excessive creatinine ingestion, or following  therapy that affects renal tubular secretion.       Calcium 02/26/2023 9.0  8.3 - 10.6 mg/dL Final    POC Glucose 02/25/2023 111 (A)  70 - 99 mg/dl Final    Performed on 02/25/2023 ACCU-CHEK   Final    POC Glucose 02/26/2023 105 (A)  70 - 99 mg/dl Final    Performed on 02/26/2023 ACCU-CHEK   Final    POC Glucose 02/26/2023 90  70 - 99 mg/dl Final    Performed on 02/26/2023 ACCU-CHEK   Final    POC Glucose 02/26/2023 74  70 - 99 mg/dl Final    Performed on 02/26/2023 ACCU-CHEK   Final    Ammonia 02/26/2023 16  16 - 60 umol/L Final    POC Glucose 02/26/2023 94  70 - 99 mg/dl Final    Performed on 02/26/2023 ACCU-CHEK   Final    WBC 02/27/2023 11.0  4.0 - 11.0 K/uL Final    RBC 02/27/2023 4.58  4.20 - 5.90 M/uL Final    Hemoglobin 02/27/2023 14.3  13.5 - 17.5 g/dL Final    Hematocrit 02/27/2023 41.8  40.5 - 52.5 % Final    MCV 02/27/2023 91.2  80.0 - 100.0 fL Final    MCH 02/27/2023 31.2  26.0 - 34.0 pg Final    MCHC 02/27/2023 34.2  31.0 - 36.0 g/dL Final    RDW 02/27/2023 14.5  12.4 - 15.4 % Final    Platelets 02/27/2023 79 (A)  135 - 450 K/uL Final    MPV 02/27/2023 7.5  5.0 - 10.5 fL Final    Neutrophils % 02/27/2023 86.0  % Final    Lymphocytes % 02/27/2023 6.8  % Final    Monocytes % 02/27/2023 7.1  % Final    Eosinophils % 02/27/2023 0.0  % Final    Basophils % 02/27/2023 0.1  % Final    Neutrophils Absolute 02/27/2023 9.4 (A)  1.7 - 7.7 K/uL Final    Lymphocytes Absolute 02/27/2023 0.8 (A)  1.0 - 5.1 K/uL Final    Monocytes Absolute 02/27/2023 0.8  0.0 - 1.3 K/uL Final    Eosinophils Absolute 02/27/2023 0.0  0.0 - 0.6 K/uL Final    Basophils Absolute 02/27/2023 0.0  0.0 - 0.2 K/uL Final    Sodium 02/27/2023 144  136 - 145 mmol/L Final    Potassium reflex Magnesium 02/27/2023 3.7  3.5 - 5.1 mmol/L Final    Chloride 02/27/2023 106  99 - 110 mmol/L Final    CO2 02/27/2023 30  21 - 32 mmol/L Final    Anion Gap 02/27/2023 8  3 - 16 Final    Glucose 02/27/2023 97  70 - 99 mg/dL Final    BUN 02/27/2023 27 (A)  7 - 20 mg/dL Final    Creatinine 02/27/2023 1.1  0.8 - 1.3 mg/dL Final    Est, Glom Filt Rate 02/27/2023 >60  >60 Final    Comment: Pediatric calculator link  [email or web address]_[email or web address]  Effective Oct 3, 2022  These results are not intended for use in patients  <25years of age. eGFR results are calculated without  a race factor using the 2021 CKD-EPI equation. Careful  clinical correlation is recommended, particularly when  comparing to results calculated using previous equations. The CKD-EPI equation is less accurate in patients with  extremes of muscle mass, extra-renal metabolism of  creatinine, excessive creatinine ingestion, or following  therapy that affects renal tubular secretion.       Calcium 02/27/2023 9.0  8.3 - 10.6 mg/dL Final    POC Glucose 02/26/2023 88  70 - 99 mg/dl Final    Performed on 02/26/2023 ACCU-CHEK   Final    POC Glucose 02/27/2023 95  70 - 99 mg/dl Final    Performed on 02/27/2023 ACCU-CHEK   Final    POC Glucose 02/27/2023 86  70 - 99 mg/dl Final Performed on 02/27/2023 ACCU-CHEK   Final    Total CK 02/27/2023 1,971 (A)  39 - 308 U/L Final    POC Glucose 02/27/2023 115 (A)  70 - 99 mg/dl Final    Performed on 02/27/2023 ACCU-CHEK   Final            Medications  Current Facility-Administered Medications: divalproex (DEPAKOTE SPRINKLE) DR capsule 125 mg, 125 mg, Oral, 2 times per day  QUEtiapine (SEROQUEL) tablet 12.5 mg, 12.5 mg, Oral, BID  perflutren lipid microspheres (DEFINITY) injection 1.5 mL, 1.5 mL, IntraVENous, ONCE PRN  dextrose 5 % and 0.45 % sodium chloride infusion, , IntraVENous, Continuous  albuterol (PROVENTIL) nebulizer solution 2.5 mg, 2.5 mg, Nebulization, Q6H PRN  [Held by provider] vitamin E capsule 400 Units, 400 Units, Oral, Daily  [Held by provider] vitamin B-12 (CYANOCOBALAMIN) tablet 500 mcg, 500 mcg, Oral, Daily  [Held by provider] therapeutic multivitamin-minerals 1 tablet, 1 tablet, Oral, Daily with breakfast  OLANZapine (ZYPREXA) tablet 5 mg, 5 mg, Oral, Q8H PRN **OR** OLANZapine (ZYPREXA) 5 mg in sterile water 1 mL injection, 5 mg, IntraMUSCular, Q8H PRN  [Held by provider] losartan (COZAAR) tablet 100 mg, 100 mg, Oral, Daily  [Held by provider] donepezil (ARICEPT) tablet 10 mg, 10 mg, Oral, Nightly  benztropine mesylate (COGENTIN) injection 2 mg, 2 mg, IntraMUSCular, BID PRN  atorvastatin (LIPITOR) tablet 40 mg, 40 mg, Oral, Nightly  aspirin EC tablet 81 mg, 81 mg, Oral, Daily  amLODIPine (NORVASC) tablet 2.5 mg, 2.5 mg, Oral, Nightly  aluminum & magnesium hydroxide-simethicone (MAALOX) 200-200-20 MG/5ML suspension 30 mL, 30 mL, Oral, Q6H PRN  sodium chloride flush 0.9 % injection 5-40 mL, 5-40 mL, IntraVENous, 2 times per day  sodium chloride flush 0.9 % injection 10 mL, 10 mL, IntraVENous, PRN  0.9 % sodium chloride infusion, , IntraVENous, PRN  potassium chloride (KLOR-CON M) extended release tablet 40 mEq, 40 mEq, Oral, PRN **OR** potassium bicarb-citric acid (EFFER-K) effervescent tablet 40 mEq, 40 mEq, Oral, PRN **OR** potassium chloride 10 mEq/100 mL IVPB (Peripheral Line), 10 mEq, IntraVENous, PRN  magnesium sulfate 1000 mg in dextrose 5% 100 mL IVPB, 1,000 mg, IntraVENous, PRN  enoxaparin (LOVENOX) injection 40 mg, 40 mg, SubCUTAneous, Daily  ondansetron (ZOFRAN-ODT) disintegrating tablet 4 mg, 4 mg, Oral, Q8H PRN **OR** ondansetron (ZOFRAN) injection 4 mg, 4 mg, IntraVENous, Q6H PRN  polyethylene glycol (GLYCOLAX) packet 17 g, 17 g, Oral, Daily PRN  acetaminophen (TYLENOL) tablet 650 mg, 650 mg, Oral, Q6H PRN **OR** acetaminophen (TYLENOL) suppository 650 mg, 650 mg, Rectal, Q6H PRN  glucose chewable tablet 16 g, 4 tablet, Oral, PRN  dextrose bolus 10% 125 mL, 125 mL, IntraVENous, PRN **OR** dextrose bolus 10% 250 mL, 250 mL, IntraVENous, PRN  glucagon (rDNA) injection 1 mg, 1 mg, SubCUTAneous, PRN  dextrose 10 % infusion, , IntraVENous, Continuous PRN  insulin lispro (HUMALOG) injection vial 0-4 Units, 0-4 Units, SubCUTAneous, Q4H  labetalol (NORMODYNE;TRANDATE) injection 10 mg, 10 mg, IntraVENous, Q6H PRN  ampicillin-sulbactam (UNASYN) 3,000 mg in sodium chloride 0.9 % 100 mL IVPB (mini-bag), 3,000 mg, IntraVENous, Q6H    ASSESSMENT AND PLAN    Principal Problem:    Acute respiratory failure with hypoxia (HCC)  Active Problems:    Aspiration into airway    Alzheimer's dementia (HCC)    Primary hypertension    Type 2 diabetes mellitus without complication, without long-term current use of insulin (HCC)    Altered mental status  Resolved Problems:    * No resolved hospital problems. *       1. Patient s symptoms   show no change  2. Probable discharge is TBD  3. Discharge planning is incomplete  4. Suicidal ideation is  ASHLEY  5. Total time with patient was 50 minutes and more than 50 % of that time was spent counseling the patient on their symptoms, treatment and expected goals.         Julio Petersen MD  Physician Psychiatry

## 2023-02-27 NOTE — PLAN OF CARE
SLP completed evaluation. Please refer to notes in EMR.     Nj Azar MA. CF- SLP  Speech Language Pathologist  [id number]

## 2023-02-27 NOTE — PROGRESS NOTES
Bilateral wrist restraints, lap restraints-  attempting restraints removed at this time family present, Patient in bed, eyes closed, no distress noted, call light in reach, will continue to monitor, bed alarm on.

## 2023-02-27 NOTE — PROGRESS NOTES
Dr. Ronald Wilkerson spoke with daughter, [facility] concerning code status and update of care. Patients code status changed to [facility] per Dr. Neli Nam order.

## 2023-02-28 LAB
ANION GAP SERPL CALCULATED.3IONS-SCNC: 8 MMOL/L (ref 3–16)
BASOPHILS ABSOLUTE: 0 K/UL (ref 0–0.2)
BASOPHILS RELATIVE PERCENT: 0.5 %
BUN BLDV-MCNC: 21 MG/DL (ref 7–20)
CALCIUM SERPL-MCNC: 8 MG/DL (ref 8.3–10.6)
CHLORIDE BLD-SCNC: 104 MMOL/L (ref 99–110)
CO2: 27 MMOL/L (ref 21–32)
CREAT SERPL-MCNC: 1 MG/DL (ref 0.8–1.3)
EOSINOPHILS ABSOLUTE: 0.1 K/UL (ref 0–0.6)
EOSINOPHILS RELATIVE PERCENT: 0.7 %
ESTIMATED AVERAGE GLUCOSE: 137 MG/DL
GFR SERPL CREATININE-BSD FRML MDRD: >60 ML/MIN/{1.73_M2}
GLUCOSE BLD-MCNC: 121 MG/DL (ref 70–99)
GLUCOSE BLD-MCNC: 124 MG/DL (ref 70–99)
GLUCOSE BLD-MCNC: 126 MG/DL (ref 70–99)
GLUCOSE BLD-MCNC: 140 MG/DL (ref 70–99)
GLUCOSE BLD-MCNC: 143 MG/DL (ref 70–99)
GLUCOSE BLD-MCNC: 163 MG/DL (ref 70–99)
GLUCOSE BLD-MCNC: 171 MG/DL (ref 70–99)
HBA1C MFR BLD: 6.4 %
HCT VFR BLD CALC: 36.2 % (ref 40.5–52.5)
HEMOGLOBIN: 12.6 G/DL (ref 13.5–17.5)
LYMPHOCYTES ABSOLUTE: 1.1 K/UL (ref 1–5.1)
LYMPHOCYTES RELATIVE PERCENT: 13.3 %
MAGNESIUM: 1.8 MG/DL (ref 1.8–2.4)
MCH RBC QN AUTO: 31.3 PG (ref 26–34)
MCHC RBC AUTO-ENTMCNC: 34.8 G/DL (ref 31–36)
MCV RBC AUTO: 90 FL (ref 80–100)
MONOCYTES ABSOLUTE: 0.6 K/UL (ref 0–1.3)
MONOCYTES RELATIVE PERCENT: 7.1 %
NEUTROPHILS ABSOLUTE: 6.7 K/UL (ref 1.7–7.7)
NEUTROPHILS RELATIVE PERCENT: 78.4 %
PDW BLD-RTO: 14.4 % (ref 12.4–15.4)
PERFORMED ON: ABNORMAL
PLATELET # BLD: 80 K/UL (ref 135–450)
PMV BLD AUTO: 7.4 FL (ref 5–10.5)
POTASSIUM REFLEX MAGNESIUM: 3 MMOL/L (ref 3.5–5.1)
RBC # BLD: 4.02 M/UL (ref 4.2–5.9)
SODIUM BLD-SCNC: 139 MMOL/L (ref 136–145)
WBC # BLD: 8.6 K/UL (ref 4–11)

## 2023-02-28 PROCEDURE — 99233 SBSQ HOSP IP/OBS HIGH 50: CPT | Performed by: INTERNAL MEDICINE

## 2023-02-28 PROCEDURE — 85025 COMPLETE CBC W/AUTO DIFF WBC: CPT

## 2023-02-28 PROCEDURE — 97530 THERAPEUTIC ACTIVITIES: CPT

## 2023-02-28 PROCEDURE — 2500000003 HC RX 250 WO HCPCS: Performed by: INTERNAL MEDICINE

## 2023-02-28 PROCEDURE — 97165 OT EVAL LOW COMPLEX 30 MIN: CPT

## 2023-02-28 PROCEDURE — 2580000003 HC RX 258: Performed by: INTERNAL MEDICINE

## 2023-02-28 PROCEDURE — 83735 ASSAY OF MAGNESIUM: CPT

## 2023-02-28 PROCEDURE — 6370000000 HC RX 637 (ALT 250 FOR IP): Performed by: INTERNAL MEDICINE

## 2023-02-28 PROCEDURE — 97535 SELF CARE MNGMENT TRAINING: CPT

## 2023-02-28 PROCEDURE — 2580000003 HC RX 258

## 2023-02-28 PROCEDURE — 36415 COLL VENOUS BLD VENIPUNCTURE: CPT

## 2023-02-28 PROCEDURE — 6370000000 HC RX 637 (ALT 250 FOR IP)

## 2023-02-28 PROCEDURE — 97161 PT EVAL LOW COMPLEX 20 MIN: CPT

## 2023-02-28 PROCEDURE — 6360000002 HC RX W HCPCS

## 2023-02-28 PROCEDURE — 2060000000 HC ICU INTERMEDIATE R&B

## 2023-02-28 PROCEDURE — 80048 BASIC METABOLIC PNL TOTAL CA: CPT

## 2023-02-28 RX ORDER — QUETIAPINE FUMARATE 25 MG/1
12.5 TABLET, FILM COATED ORAL NIGHTLY
Status: DISCONTINUED | OUTPATIENT
Start: 2023-02-28 | End: 2023-03-01

## 2023-02-28 RX ORDER — DEXTROSE, SODIUM CHLORIDE, AND POTASSIUM CHLORIDE 5; .45; .15 G/100ML; G/100ML; G/100ML
INJECTION INTRAVENOUS CONTINUOUS
Status: DISCONTINUED | OUTPATIENT
Start: 2023-02-28 | End: 2023-03-01

## 2023-02-28 RX ADMIN — OLANZAPINE 5 MG: 5 TABLET, FILM COATED ORAL at 22:34

## 2023-02-28 RX ADMIN — ATORVASTATIN CALCIUM 40 MG: 40 TABLET, FILM COATED ORAL at 20:29

## 2023-02-28 RX ADMIN — AMPICILLIN SODIUM AND SULBACTAM SODIUM 3000 MG: 2; 1 INJECTION, POWDER, FOR SOLUTION INTRAMUSCULAR; INTRAVENOUS at 17:54

## 2023-02-28 RX ADMIN — POTASSIUM BICARBONATE 20 MEQ: 782 TABLET, EFFERVESCENT ORAL at 10:50

## 2023-02-28 RX ADMIN — AMPICILLIN SODIUM AND SULBACTAM SODIUM 3000 MG: 2; 1 INJECTION, POWDER, FOR SOLUTION INTRAMUSCULAR; INTRAVENOUS at 11:47

## 2023-02-28 RX ADMIN — AMLODIPINE BESYLATE 2.5 MG: 2.5 TABLET ORAL at 20:29

## 2023-02-28 RX ADMIN — DEXTROSE AND SODIUM CHLORIDE: 5; 450 INJECTION, SOLUTION INTRAVENOUS at 05:09

## 2023-02-28 RX ADMIN — AMPICILLIN SODIUM AND SULBACTAM SODIUM 3000 MG: 2; 1 INJECTION, POWDER, FOR SOLUTION INTRAMUSCULAR; INTRAVENOUS at 22:41

## 2023-02-28 RX ADMIN — DIVALPROEX SODIUM 125 MG: 125 CAPSULE, COATED PELLETS ORAL at 20:29

## 2023-02-28 RX ADMIN — CYANOCOBALAMIN TAB 500 MCG 500 MCG: 500 TAB at 09:28

## 2023-02-28 RX ADMIN — AMPICILLIN SODIUM AND SULBACTAM SODIUM 3000 MG: 2; 1 INJECTION, POWDER, FOR SOLUTION INTRAMUSCULAR; INTRAVENOUS at 05:29

## 2023-02-28 RX ADMIN — DIVALPROEX SODIUM 125 MG: 125 CAPSULE, COATED PELLETS ORAL at 09:28

## 2023-02-28 RX ADMIN — DEXTROSE MONOHYDRATE, SODIUM CHLORIDE, AND POTASSIUM CHLORIDE: 50; 4.5; 1.49 INJECTION, SOLUTION INTRAVENOUS at 09:47

## 2023-02-28 RX ADMIN — DEXTROSE MONOHYDRATE, SODIUM CHLORIDE, AND POTASSIUM CHLORIDE: 50; 4.5; 1.49 INJECTION, SOLUTION INTRAVENOUS at 22:36

## 2023-02-28 RX ADMIN — Medication 400 UNITS: at 10:38

## 2023-02-28 RX ADMIN — ASPIRIN 81 MG: 81 TABLET, COATED ORAL at 09:27

## 2023-02-28 RX ADMIN — QUETIAPINE FUMARATE 12.5 MG: 25 TABLET ORAL at 20:30

## 2023-02-28 ASSESSMENT — PAIN SCALES - WONG BAKER
WONGBAKER_NUMERICALRESPONSE: 0

## 2023-02-28 ASSESSMENT — PAIN SCALES - GENERAL: PAINLEVEL_OUTOF10: 0

## 2023-02-28 NOTE — PROGRESS NOTES
Vitals are stable. Patient remains restraint free. Daughter present. Patient is being cooperative at this time.

## 2023-02-28 NOTE — FLOWSHEET NOTE
02/28/23 0200   Vital Signs   Temp 96.8 °F (36 °C)   Temp Source Oral   Heart Rate 73   Heart Rate Source Monitor   Resp 14   BP (!) 145/77   MAP (Calculated) 100   BP Location Left upper arm   BP Method Automatic   Patient Position Semi fowlers   Level of Consciousness 1   MEWS Score 1   Pain Assessment   Pain Assessment 0-10   Oxygen Therapy   SpO2 96 %   O2 Device None (Room air)   Height and Weight   Weight 133 lb 12.8 oz (60.7 kg)   Weight Method Bed scale   BMI (Calculated) 23     Vitals taken, shown above. Patient is stable, has posey belt and bilat wrist restraints on at this time. Incontinent care provided by myself and pca. Patient range of motion completed. Apple juice offered but denied. No current needs at this time. Call light in reach. Bed in lowest position. Bed alarm is on. Tele sitter at bedside.

## 2023-02-28 NOTE — PROGRESS NOTES
Hand off report given to  Parul Amin RN. Patient is stable showing no signs of distress and has no current needs at this time. Call light is in reach and bed is in lowest position. Care is transferred at this time.

## 2023-02-28 NOTE — DISCHARGE SUMMARY
Discharge Summary   Admit Date: 2/24/2023   Discharge Date:  2/25/2023    Condition at DC unstable  Transferred to PCU for treatment of pneumonia  Spent over 40 minutes with patient and staff on [facility] with more than 50 % of time spent with patient discussing care  Final Dx: axis I: Dementia with behavioral disturbance   Axis 2: No diagnosis  Gordon 3: See Medical History    And Present on Admission:   Dementia with behavioral disturbance     Axis 4: Other psychosocial and environmental problems  Axis 5:  On Admission: 31-40 impairment in reality testing At Discharge: 31-40 impairment in reality testing   All conditions on Axis 1 and Axis 2 and active problems on Axis 3 were treated while patient was hospitalized. [facility] Problems    Diagnosis Date Noted    Dementia with behavioral disturbance [F03.918] 02/25/2023     Priority: Medium   )   Condition on DC  Mood and affect are stable and pt is not suicidal   VITALS:  /80   Pulse 90   Temp 98.7 °F (37.1 °C) (Temporal)   Resp 18   SpO2 97%   Brief Summary Present Illness   CHIEF COMPLAINT:  Dementia. HISTORY OF PRESENT ILLNESS:  The patient in an 73-year-old male with a  history of Alzheimer's who is brought to the ED at [facility] on  02/24/2023 after he had been wandering from home for miles in the past  few days. Apparently, family has been concerned about his altered  mental status. Apparently, he has been leaving the house for the past  few days. The patient in the ED was unable to give much information  regarding his current behavior. There is collateral from his daughter,  Trista Koo and she stated that the patient was putting her and the  patient's wife at risk after threatened to kill both of them while  swinging a . Daughter states that he looked her in the eye and  said \"I'm going to kill you. \"  Apparently, he has also been wandering  off on his own for long distances.   Last week he said he was going to  kill himself before storming out of the house and made his way from  [facility] to [place] before being found. Apparently, he has been  sleeping poorly, staying up all night. Family feels like they cannot  manage him safely. He also fell in the shower but normally could walk  and ambulate. Apparently, he has made up a statement about suicide on  other occasions to his wife but no specific plan. He was diagnosed with  Alzheimer's a year ago by [facility] and family reports that they can  no longer keep him safe in the home and are looking for placement at  [facility]. Hospital Course  Patient never stabilized medically and was found to be hypoxic and mucus plugs in lower lobes. Patient was discharged to PCU to stabilize medically .  Expect return to [address] [place] [address]  PE: (reviewed) and labs (see medical H&PE)  Labs:    Admission on 02/25/2023   Component Date Value Ref Range Status    Ventricular Rate 02/25/2023 96  BPM Final    Atrial Rate 02/25/2023 96  BPM Final    P-R Interval 02/25/2023 150  ms Final    QRS Duration 02/25/2023 78  ms Final    Q-T Interval 02/25/2023 382  ms Final    QTc Calculation (Bazett) 02/25/2023 482  ms Final    P Axis 02/25/2023 41  degrees Final    R Axis 02/25/2023 -30  degrees Final    T [place] 02/25/2023 72  degrees Final    Diagnosis 02/25/2023 Normal sinus rhythmLeft axis deviationNonspecific T wave abnormalityAbnormal ECGWhen compared with ECG of 24-FEB-2023 19:24,Significant changes have occurredConfirmed by Farheen Bobby MD, [address] (1984) on 2/26/2023 8:05:21 AM   Final    Procalcitonin 02/25/2023 1.58 (A)  0.00 - 0.15 ng/mL Final    Comment: Suspected Sepsis:  Low likelihood of sepsis  <.50 ng/mL    Increased likelihood of sepsis 0.50-2.00 ng/mL  Antibiotics encouraged    High risk of sepsis/shock   >2.00 ng/mL  Antibiotics strongly encouraged    Suspected Lower Respiratory Tract Infections:  Low likelihood of bacterial infection  <0.24 ng/mL    Increased likelihood of bacterial infection >0.24 ng/mL  Antibiotics encouraged    With successful antibiotic therapy, PCT levels should decrease  rapidly. (Half-life of 24 to 36 hours.)    Procalcitonin values from samples collected within the first  6 hours of systemic infection may still be low. Retesting may be indicated. Values from day 1 and day 4 can be entered into the Change in  Procalcitonin Calculator to determine the patient's  Mortality Risk Prognosis  ([email or web address]. [email or web address])    In healthy neonates, plasma Procalcitonin (PCT) concentrations  increase gradually after birth, reaching peak values at about  24 hours of age then decrease to normal values below 0.5                            ng/mL  by 48-72 hours of age.       pH, Arterial 02/25/2023 7.408  7.350 - 7.450 Final    pCO2, Arterial 02/25/2023 47.5 (A)  35.0 - 45.0 mmHg Final    pO2, Arterial 02/25/2023 29.0 (A)  75.0 - 108.0 mmHg Final    HCO3, Arterial 02/25/2023 29.3 (A)  21.0 - 29.0 mmol/L Final    Base Excess, Arterial 02/25/2023 3.7 (A)  -3.0 - 3.0 mmol/L Final    Hemoglobin, Art, Extended 02/25/2023 16.3  13.5 - 17.5 g/dL Final    O2 Sat, Arterial 02/25/2023 54.8 (A)  >92 % Final    Carboxyhgb, Arterial 02/25/2023 1.0  0.0 - 1.5 % Final    Comment:      0.0-1.5  (Smokers 1.5-5.0)      Methemoglobin, Arterial 02/25/2023 0.0  <1.5 % Final    TCO2, Arterial 02/25/2023 30.8  Not Established mmol/L Final    O2 Therapy 02/25/2023 Unknown   Final    Hemoglobin A1C 02/25/2023 6.5  See comment % Final    Comment: Comment:  Diagnosis of Diabetes: > or = 6.5%  Increased risk of diabetes (Prediabetes): 5.7-6.4%  Glycemic Control: Nonpregnant Adults: <7.0%                    Pregnant: <6.0%        eAG 02/25/2023 139.9  mg/dL Final    Sodium 02/25/2023 143  136 - 145 mmol/L Final    Potassium reflex Magnesium 02/25/2023 3.8  3.5 - 5.1 mmol/L Final    Chloride 02/25/2023 103  99 - 110 mmol/L Final    CO2 02/25/2023 26  21 - 32 mmol/L Final    Anion Gap 02/25/2023 14  3 - 16 Final Glucose 02/25/2023 130 (A)  70 - 99 mg/dL Final    BUN 02/25/2023 23 (A)  7 - 20 mg/dL Final    Creatinine 02/25/2023 1.4 (A)  0.8 - 1.3 mg/dL Final    Est, Glom Filt Rate 02/25/2023 50 (A)  >60 Final    Comment: Pediatric calculator link  [email or web address]  Effective Oct 3, 2022  These results are not intended for use in patients  <25years of age. eGFR results are calculated without  a race factor using the 2021 CKD-EPI equation. Careful  clinical correlation is recommended, particularly when  comparing to results calculated using previous equations. The CKD-EPI equation is less accurate in patients with  extremes of muscle mass, extra-renal metabolism of  creatinine, excessive creatinine ingestion, or following  therapy that affects renal tubular secretion.       Calcium 02/25/2023 9.1  8.3 - 10.6 mg/dL Final    WBC 02/25/2023 12.5 (A)  4.0 - 11.0 K/uL Final    RBC 02/25/2023 5.02  4.20 - 5.90 M/uL Final    Hemoglobin 02/25/2023 15.5  13.5 - 17.5 g/dL Final    Hematocrit 02/25/2023 47.1  40.5 - 52.5 % Final    MCV 02/25/2023 93.7  80.0 - 100.0 fL Final    MCH 02/25/2023 30.8  26.0 - 34.0 pg Final    MCHC 02/25/2023 32.9  31.0 - 36.0 g/dL Final    RDW 02/25/2023 14.8  12.4 - 15.4 % Final    Platelets 95/29/8822 80 (A)  135 - 450 K/uL Final    MPV 02/25/2023 7.5  5.0 - 10.5 fL Final    Neutrophils % 02/25/2023 85.9  % Final    Lymphocytes % 02/25/2023 3.4  % Final    Monocytes % 02/25/2023 10.6  % Final    Eosinophils % 02/25/2023 0.0  % Final    Basophils % 02/25/2023 0.1  % Final    Neutrophils Absolute 02/25/2023 10.7 (A)  1.7 - 7.7 K/uL Final    Lymphocytes Absolute 02/25/2023 0.4 (A)  1.0 - 5.1 K/uL Final    Monocytes Absolute 02/25/2023 1.3  0.0 - 1.3 K/uL Final    Eosinophils Absolute 02/25/2023 0.0  0.0 - 0.6 K/uL Final    Basophils Absolute 02/25/2023 0.0  0.0 - 0.2 K/uL Final    Lactic Acid 02/25/2023 2.6 (A)  0.4 - 2.0 mmol/L Final    WBC 02/26/2023 13.2 (A)  4.0 - 11.0 K/uL Final    RBC 02/26/2023 4.57  4.20 - 5.90 M/uL Final    Hemoglobin 02/26/2023 13.9  13.5 - 17.5 g/dL Final    Hematocrit 02/26/2023 42.4  40.5 - 52.5 % Final    MCV 02/26/2023 92.7  80.0 - 100.0 fL Final    MCH 02/26/2023 30.4  26.0 - 34.0 pg Final    MCHC 02/26/2023 32.8  31.0 - 36.0 g/dL Final    RDW 02/26/2023 14.5  12.4 - 15.4 % Final    Platelets 33/43/0741 86 (A)  135 - 450 K/uL Final    MPV 02/26/2023 7.5  5.0 - 10.5 fL Final    Neutrophils % 02/26/2023 84.5  % Final    Lymphocytes % 02/26/2023 6.3  % Final    Monocytes % 02/26/2023 8.9  % Final    Eosinophils % 02/26/2023 0.0  % Final    Basophils % 02/26/2023 0.3  % Final    Neutrophils Absolute 02/26/2023 11.2 (A)  1.7 - 7.7 K/uL Final    Lymphocytes Absolute 02/26/2023 0.8 (A)  1.0 - 5.1 K/uL Final    Monocytes Absolute 02/26/2023 1.2  0.0 - 1.3 K/uL Final    Eosinophils Absolute 02/26/2023 0.0  0.0 - 0.6 K/uL Final    Basophils Absolute 02/26/2023 0.0  0.0 - 0.2 K/uL Final    Sodium 02/26/2023 145  136 - 145 mmol/L Final    Potassium reflex Magnesium 02/26/2023 4.7  3.5 - 5.1 mmol/L Final    Chloride 02/26/2023 105  99 - 110 mmol/L Final    CO2 02/26/2023 30  21 - 32 mmol/L Final    Anion Gap 02/26/2023 10  3 - 16 Final    Glucose 02/26/2023 100 (A)  70 - 99 mg/dL Final    BUN 02/26/2023 28 (A)  7 - 20 mg/dL Final    Creatinine 02/26/2023 1.4 (A)  0.8 - 1.3 mg/dL Final    Est, Laura Filt Rate 02/26/2023 50 (A)  >60 Final    Comment: Pediatric calculator link  [email or web address]  Effective Oct 3, 2022  These results are not intended for use in patients  <25years of age. eGFR results are calculated without  a race factor using the 2021 CKD-EPI equation. Careful  clinical correlation is recommended, particularly when  comparing to results calculated using previous equations.   The CKD-EPI equation is less accurate in patients with  extremes of muscle mass, extra-renal metabolism of  creatinine, excessive creatinine ingestion, or following  therapy that affects renal tubular secretion.       Calcium 02/26/2023 9.0  8.3 - 10.6 mg/dL Final    POC Glucose 02/25/2023 111 (A)  70 - 99 mg/dl Final    Performed on 02/25/2023 ACCU-CHEK   Final    POC Glucose 02/26/2023 105 (A)  70 - 99 mg/dl Final    Performed on 02/26/2023 ACCU-CHEK   Final    POC Glucose 02/26/2023 90  70 - 99 mg/dl Final    Performed on 02/26/2023 ACCU-CHEK   Final    POC Glucose 02/26/2023 74  70 - 99 mg/dl Final    Performed on 02/26/2023 ACCU-CHEK   Final    Ammonia 02/26/2023 16  16 - 60 umol/L Final    POC Glucose 02/26/2023 94  70 - 99 mg/dl Final    Performed on 02/26/2023 ACCU-CHEK   Final    WBC 02/27/2023 11.0  4.0 - 11.0 K/uL Final    RBC 02/27/2023 4.58  4.20 - 5.90 M/uL Final    Hemoglobin 02/27/2023 14.3  13.5 - 17.5 g/dL Final    Hematocrit 02/27/2023 41.8  40.5 - 52.5 % Final    MCV 02/27/2023 91.2  80.0 - 100.0 fL Final    MCH 02/27/2023 31.2  26.0 - 34.0 pg Final    MCHC 02/27/2023 34.2  31.0 - 36.0 g/dL Final    RDW 02/27/2023 14.5  12.4 - 15.4 % Final    Platelets 44/40/3725 79 (A)  135 - 450 K/uL Final    MPV 02/27/2023 7.5  5.0 - 10.5 fL Final    Neutrophils % 02/27/2023 86.0  % Final    Lymphocytes % 02/27/2023 6.8  % Final    Monocytes % 02/27/2023 7.1  % Final    Eosinophils % 02/27/2023 0.0  % Final    Basophils % 02/27/2023 0.1  % Final    Neutrophils Absolute 02/27/2023 9.4 (A)  1.7 - 7.7 K/uL Final    Lymphocytes Absolute 02/27/2023 0.8 (A)  1.0 - 5.1 K/uL Final    Monocytes Absolute 02/27/2023 0.8  0.0 - 1.3 K/uL Final    Eosinophils Absolute 02/27/2023 0.0  0.0 - 0.6 K/uL Final    Basophils Absolute 02/27/2023 0.0  0.0 - 0.2 K/uL Final    Sodium 02/27/2023 144  136 - 145 mmol/L Final    Potassium reflex Magnesium 02/27/2023 3.7  3.5 - 5.1 mmol/L Final    Chloride 02/27/2023 106  99 - 110 mmol/L Final    CO2 02/27/2023 30  21 - 32 mmol/L Final    Anion Gap 02/27/2023 8  3 - 16 Final    Glucose 02/27/2023 97  70 - 99 mg/dL Final    BUN 02/27/2023 27 (A)  7 - 20 mg/dL Final    Creatinine 02/27/2023 1.1  0.8 - 1.3 mg/dL Final    EstLaura Filt Rate 02/27/2023 >60  >60 Final    Comment: Pediatric calculator link  [email or web address]  Effective Oct 3, 2022  These results are not intended for use in patients  <25years of age. eGFR results are calculated without  a race factor using the 2021 CKD-EPI equation. Careful  clinical correlation is recommended, particularly when  comparing to results calculated using previous equations. The CKD-EPI equation is less accurate in patients with  extremes of muscle mass, extra-renal metabolism of  creatinine, excessive creatinine ingestion, or following  therapy that affects renal tubular secretion.       Calcium 02/27/2023 9.0  8.3 - 10.6 mg/dL Final    POC Glucose 02/26/2023 88  70 - 99 mg/dl Final    Performed on 02/26/2023 ACCU-CHEK   Final    POC Glucose 02/27/2023 95  70 - 99 mg/dl Final    Performed on 02/27/2023 ACCU-CHEK   Final    POC Glucose 02/27/2023 86  70 - 99 mg/dl Final    Performed on 02/27/2023 ACCU-CHEK   Final    Total CK 02/27/2023 1,971 (A)  39 - 308 U/L Final    POC Glucose 02/27/2023 115 (A)  70 - 99 mg/dl Final    Performed on 02/27/2023 ACCU-CHEK   Final    POC Glucose 02/27/2023 174 (A)  70 - 99 mg/dl Final    Performed on 02/27/2023 ACCU-CHEK   Final    POC Glucose 02/27/2023 185 (A)  70 - 99 mg/dl Final    Performed on 02/27/2023 ACCU-CHEK   Final   Admission on 02/24/2023, Discharged on 02/25/2023   Component Date Value Ref Range Status    WBC 02/24/2023 5.5  4.0 - 11.0 K/uL Final    RBC 02/24/2023 4.34  4.20 - 5.90 M/uL Final    Hemoglobin 02/24/2023 13.3 (A)  13.5 - 17.5 g/dL Final    Hematocrit 02/24/2023 39.3 (A)  40.5 - 52.5 % Final    MCV 02/24/2023 90.6  80.0 - 100.0 fL Final    MCH 02/24/2023 30.7  26.0 - 34.0 pg Final    MCHC 02/24/2023 33.9  31.0 - 36.0 g/dL Final    RDW 02/24/2023 14.3  12.4 - 15.4 % Final Platelets 49/61/2688 81 (A)  135 - 450 K/uL Final    MPV 02/24/2023 7.1  5.0 - 10.5 fL Final    PLATELET SLIDE REVIEW 02/24/2023 Decreased   Final    SLIDE REVIEW 02/24/2023 see below   Final    Slide review agrees with reported results    Neutrophils % 02/24/2023 71.0  % Final    Lymphocytes % 02/24/2023 18.7  % Final    Monocytes % 02/24/2023 9.2  % Final    Eosinophils % 02/24/2023 0.7  % Final    Basophils % 02/24/2023 0.4  % Final    Neutrophils Absolute 02/24/2023 3.9  1.7 - 7.7 K/uL Final    Lymphocytes Absolute 02/24/2023 1.0  1.0 - 5.1 K/uL Final    Monocytes Absolute 02/24/2023 0.5  0.0 - 1.3 K/uL Final    Eosinophils Absolute 02/24/2023 0.0  0.0 - 0.6 K/uL Final    Basophils Absolute 02/24/2023 0.0  0.0 - 0.2 K/uL Final    Sodium 02/24/2023 141  136 - 145 mmol/L Final    Potassium reflex Magnesium 02/24/2023 3.2 (A)  3.5 - 5.1 mmol/L Final    Chloride 02/24/2023 103  99 - 110 mmol/L Final    CO2 02/24/2023 30  21 - 32 mmol/L Final    Anion Gap 02/24/2023 8  3 - 16 Final    Glucose 02/24/2023 62 (A)  70 - 99 mg/dL Final    BUN 02/24/2023 19  7 - 20 mg/dL Final    Creatinine 02/24/2023 1.0  0.8 - 1.3 mg/dL Final    Est, Glom Filt Rate 02/24/2023 >60  >60 Final    Comment: Pediatric calculator link  [email or web address]  Effective Oct 3, 2022  These results are not intended for use in patients  <25years of age. eGFR results are calculated without  a race factor using the 2021 CKD-EPI equation. Careful  clinical correlation is recommended, particularly when  comparing to results calculated using previous equations. The CKD-EPI equation is less accurate in patients with  extremes of muscle mass, extra-renal metabolism of  creatinine, excessive creatinine ingestion, or following  therapy that affects renal tubular secretion.       Calcium 02/24/2023 9.1  8.3 - 10.6 mg/dL Final    Total Protein 02/24/2023 6.4  6.4 - 8.2 g/dL Final    Albumin 02/24/2023 3.9  3.4 - 5.0 g/dL Final    Albumin/Globulin Ratio 02/24/2023 1.6  1.1 - 2.2 Final    Total Bilirubin 02/24/2023 0.4  0.0 - 1.0 mg/dL Final    Alkaline Phosphatase 02/24/2023 69  40 - 129 U/L Final    ALT 02/24/2023 19  10 - 40 U/L Final    AST 02/24/2023 21  15 - 37 U/L Final    Ventricular Rate 02/24/2023 91  BPM Final    Atrial Rate 02/24/2023 91  BPM Final    P-R Interval 02/24/2023 138  ms Final    QRS Duration 02/24/2023 82  ms Final    Q-T Interval 02/24/2023 370  ms Final    QTc Calculation (Bazett) 02/24/2023 455  ms Final    P Axis 02/24/2023 62  degrees Final    R Axis 02/24/2023 46  degrees Final    T Axis 02/24/2023 -28  degrees Final    Diagnosis 02/24/2023 Normal sinus rhythmPossible Inferior infarct , age undeterminedAbnormal ECGNo previous ECGs availableConfirmed by Lidia George MD, [address] (1984) on 2/25/2023 5:35:04 AM   Final    Troponin 02/24/2023 0.13 (A)  <0.01 ng/mL Final    Methodology by Troponin T    Color, UA 02/24/2023 Yellow  Straw/Yellow Final    Clarity, UA 02/24/2023 Clear  Clear Final    Glucose, Ur 02/24/2023 Negative  Negative mg/dL Final    Bilirubin Urine 02/24/2023 Negative  Negative Final    Ketones, Urine 02/24/2023 Negative  Negative mg/dL Final    Specific Gravity, UA 02/24/2023 1.010  1.005 - 1.030 Final    Blood, Urine 02/24/2023 TRACE-INTACT (A)  Negative Final    pH, UA 02/24/2023 6.0  5.0 - 8.0 Final    Protein, UA 02/24/2023 TRACE (A)  Negative mg/dL Final    Urobilinogen, Urine 02/24/2023 0.2  <2.0 E.U./dL Final    Nitrite, Urine 02/24/2023 Negative  Negative Final    Leukocyte Esterase, Urine 02/24/2023 Negative  Negative Final    Microscopic Examination 02/24/2023 YES   Final    Urine Type 02/24/2023 see below   Final    Urine received in a container without preservatives.  NotGiven    Ethanol Lvl 02/24/2023 None Detected  mg/dL Final    Comment:    None Detected  Conversion factor:  100 mg/dl = .100 g/dl  For Medical Purposes Only      Acetaminophen Level 02/24/2023 <5 (A)  10 - 30 ug/mL Final    Comment: Therapeutic Range: 10.0-30.0 ug/mL  Toxic: >=713 ug/mL      Salicylate, Serum 72/24/9419 <0.3 (A)  15.0 - 30.0 mg/dL Final    Comment: Therapeutic Range: 15.0-30.0 mg/dL  Toxic: >30.0 mg/dL      WBC, UA 02/24/2023 0-2  0 - 5 /HPF Final    RBC, UA 02/24/2023 None seen  0 - 4 /HPF Final    Bacteria, UA 02/24/2023 Rare (A)  None Seen /HPF Final    Magnesium 02/24/2023 2.10  1.80 - 2.40 mg/dL Final    Troponin 02/24/2023 0.11 (A)  <0.01 ng/mL Final    Methodology by Troponin T    POC Glucose 02/24/2023 192 (A)  70 - 99 mg/dl Final    Performed on 02/24/2023 ACCU-CHEK   Final    SARS-CoV-2 RNA, RT PCR 02/24/2023 NOT DETECTED  NOT DETECTED Final    Comment: Not Detected results do not preclude SARS-CoV-2 infection and  should not be used as the sole basis for patient management  decisions. Results must be combined with clinical observations,  patient history, and epidemiological information. Testing was performed using VINI SHIRA SARS-CoV-2 and Influenza A/B  nucleic acid assay. This test is a multiplex Real-Time Reverse  Transcriptase Polymerase Chain Reaction (RT-PCR)-based in vitro  diagnostic test intended for the qualitative detection of nucleic  acids from SARS-CoV-2, influenza A, and influenza B in nasopharyngeal  and nasal swab specimens for use under the FDAs Emergency Use  Authorization (EUA) only.     Patient Fact Sheet:  FindDrives.pl  Provider Fact Sheet: FindDrives.pl  EUA: FindDrives.pl  IFU: FindDrives.pl    Methodology:  RT-PCR      INFLUENZA A 02/24/2023 NOT DETECTED  NOT DETECTED Final    INFLUENZA B 02/24/2023 NOT DETECTED  NOT DETECTED Final    TSH 02/24/2023 3.38  0.27 - 4.20 uIU/mL Final    Troponin 02/25/2023 0.09 (A)  <0.01 ng/mL Final    Methodology by Troponin T        Mental Status Exam at Discharge:  Level of consciousness:  asleep  Appearance: ill-appearing, in bed good grooming and good hygiene well-developed, well-nourished  Behavior/Motor:  did not observe gait and station AIMS: 0  Attitude toward examiner: asleep  Speech:  asleep  Mood:  ASHLEY  Affect:  mood congruent Anxiety:asleep  Hallucinations: ASHLEY  Thought processes: ASHLEY Attention span,UTAThought content:  ASHLEY for delusions OCD: none    Insight: impaired insight and judgment Cognition:  oriented to person, place, and time  Fund of Knowledge: average  IQ:average Memory: poor  Suicide:  UTASleep: sleeps through the night  Appetite: poor  Reassess [place] Risk:  no specific plan to harm self Pt has phone numbers to contact if suicidal thoughts recur and states pt will return to the hospital if suicidal feelings return.    Hospital Routine Meds:     Hospital PRN Meds:    Discharge Meds:    Discharge Medication List as of 2/25/2023  4:27 PM             Details   donepezil (ARICEPT) 10 MG tablet donepezil 10 mg tabletHistorical Med      divalproex (DEPAKOTE ER) 500 MG extended release tablet divalproex  mg tablet,extended release 24 hr   TAKE 1 TABLET BY MOUTH ONCE DAILY FOR 2 WEEKS THEN INCREASE TO 1 TABLET TWICE DAILYHistorical Med      glimepiride (AMARYL) 1 MG tablet TAKE 1 TABLET BY MOUTH EVERY DAYHistorical Med      losartan (COZAAR) 100 MG tablet losartan 100 mg tablet   TAKE 1 TABLET BY MOUTH DAILYHistorical Med      atorvastatin (LIPITOR) 40 MG tablet atorvastatin 40 mg tablet   TAKE 1 TABLET BY MOUTH EVERY DAY AT BEDTIMEHistorical Med      amLODIPine (NORVASC) 2.5 MG tablet amlodipine 2.5 mg tablet   TAKE 1 TABLET BY MOUTH EVERY DAY AT BEDTIMEHistorical Med               Disposition - PCU      Follow Up:  See Discharge Instructions     Joe Spence MD  Physician Psychiatry

## 2023-02-28 NOTE — FLOWSHEET NOTE
02/27/23 2000   Vital Signs   Temp 97.9 °F (36.6 °C)   Temp Source Oral   Heart Rate 70   Heart Rate Source Monitor   Resp 20   BP (!) 143/76   MAP (Calculated) 98   BP Location Left upper arm   BP Method Automatic   Patient Position Semi fowlers   Level of Consciousness 0   MEWS Score 1   Pain Assessment   Pain Assessment Grace-Baker FACES   Grace-Baker Pain Rating 0   Oxygen Therapy   SpO2 93 %   Pulse Oximeter Device Mode Continuous   O2 Device None (Room air)   Skin Assessment Clean, dry, & intact     Shift assessment completed see flow sheet. Patient in bed alert and oriented to self  Patient on room air, showing no signs of distress. Evening medications given per order with no issues. Depakote held per daughter request. Elistephanie Briggs were off upon my shift, d/c order. Tele still at bedside as well as daughter who was given the ok to stay. No other needs at this time. Call light in reach.

## 2023-02-28 NOTE — PROGRESS NOTES
Progress Note    Admit Date:  2/25/2023    -pmhx of dementia    Transferred from psychiatry for confusion and hypoxia, abn troponin    Subjective:-    Mr. Wilbur Alvarez has increasing agitation since last night needing restraints again   Given seroquel and now sleepy   Daughter at bedside    Pt seen up in bed, awake, alert and confused with baseline dementia  Remains on RA  No fevers     Objective:   No data found. Intake/Output Summary (Last 24 hours) at 2/28/2023 0720  Last data filed at 2/28/2023 0531  Gross per 24 hour   Intake 4143.33 ml   Output --   Net 4143.33 ml         Physical Exam:    General:  elderly male, confused and awake, alert   Mucous Membranes:  Pink , anicteric  Neck: No JVD, no carotid bruit, no thyromegaly  Chest:  Clear to auscultation bilaterally, diminished in bases  Cardiovascular:  RRR S1S2 heard, no murmurs or gallops  Abdomen:  Soft, undistended, non tender, no organomegaly, BS present  Extremities: No edema or cyanosis.  Distal pulses well felt  Neurological : non focal but very confused , improving alertness        Medications:  divalproex, 125 mg, 2 times per day  QUEtiapine, 12.5 mg, BID  [Held by provider] vitamin E, 400 Units, Daily  [Held by provider] vitamin B-12, 500 mcg, Daily  [Held by provider] therapeutic multivitamin-minerals, 1 tablet, Daily with breakfast  [Held by provider] losartan, 100 mg, Daily  [Held by provider] donepezil, 10 mg, Nightly  atorvastatin, 40 mg, Nightly  aspirin, 81 mg, Daily  amLODIPine, 2.5 mg, Nightly  sodium chloride flush, 5-40 mL, 2 times per day  enoxaparin, 40 mg, Daily  insulin lispro, 0-4 Units, Q4H  ampicillin-sulbactam, 3,000 mg, Q6H    PRN Medications:  perflutren lipid microspheres, 1.5 mL, ONCE PRN  albuterol, 2.5 mg, Q6H PRN  OLANZapine, 5 mg, Q8H PRN   Or  OLANZapine (ZyPREXA) in sterile water IntraMUSCular, 5 mg, Q8H PRN  benztropine mesylate, 2 mg, BID PRN  aluminum & magnesium hydroxide-simethicone, 30 mL, Q6H PRN  sodium chloride flush, 10 mL, PRN  sodium chloride, , PRN  potassium chloride, 40 mEq, PRN   Or  potassium alternative oral replacement, 40 mEq, PRN   Or  potassium chloride, 10 mEq, PRN  magnesium sulfate, 1,000 mg, PRN  ondansetron, 4 mg, Q8H PRN   Or  ondansetron, 4 mg, Q6H PRN  polyethylene glycol, 17 g, Daily PRN  acetaminophen, 650 mg, Q6H PRN   Or  acetaminophen, 650 mg, Q6H PRN  glucose, 4 tablet, PRN  dextrose bolus, 125 mL, PRN   Or  dextrose bolus, 250 mL, PRN  glucagon (rDNA), 1 mg, PRN  dextrose, , Continuous PRN  labetalol, 10 mg, Q6H PRN        Data:  CBC:   Recent Labs     02/26/23  0503 02/27/23  0523 02/28/23  0522   WBC 13.2* 11.0 8.6   HGB 13.9 14.3 12.6*   HCT 42.4 41.8 36.2*   MCV 92.7 91.2 90.0   PLT 86* 79* 80*       BMP:   Recent Labs     02/26/23  0503 02/27/23  0523 02/28/23  0522    144 139   K 4.7 3.7 3.0*    106 104   CO2 30 30 27   BUN 28* 27* 21*   CREATININE 1.4* 1.1 1.0       LIVER PROFILE:   No results for input(s): AST, ALT, LIPASE, BILIDIR, BILITOT, ALKPHOS in the last 72 hours. Invalid input(s): AMYLASE,  ALB    PT/INR: No results for input(s): PROTIME, INR in the last 72 hours. CULTURES  COVID and Flu not detected   Sputum culture pending      RADIOLOGY  XR CHEST PORTABLE   Final Result   Bands of opacity in the lower lungs bilaterally, new when compared to the   previous exam.  These are probably atelectasis, but pneumonia or aspiration   remains a differential.           CT chest 2/26/23    1. Dense mucous plugging within both lower lobes, with partial consolidation   of both lower lobes, which could represent either aspiration or pneumonia. 2. Cholelithiasis.        Assessment/Plan:    #Acute encephalopathy - likely toxic with meds  -2/2 to medication given on adm, Given ativan and geodon in ED  -held depakote and other sedatives now with severe drowsiness  - improved drowsiness but remains agitated with baseline dementia issues  - resume depakote at lower dose and increase slowly as tolerated    -had negative CT head in ED   - add seroquel nightly low dose   - psychiatry following  - updated daughter at bedside regarding plan of care and plans for rehab       #Acute hypoxic respiratory failure   - sec to atelectasis vs aspiration pneumonia   -requiring 5 L O2 while sleeping initially     -CXR was neg in ED   --CT chest now with dense mucus plugging, consolidations possible aspiration or pna   -respiratory team were able to suction significant amount of mucus out   -IV unasyn started   -procalc, LA  elevated   -sputum culture pending  -weaned to RA , SLP eval done   -pulm consulted      #Alzheimers dementia with behavioral disturbances   - per psychiatry team  -  resume depakote and add seroquel as above     #Elevated Cr   -Cr 1.4 today  -improved with IVF      #Elevated troponin   0.13--> 0.11 >0.09  -EKG without acute ischemic changes   -on ASA, statin  -cardiology consulted and no plans for workup given severe agitation issues     #CAD   -s/p CABG   -on ASA, statin      #HTN   -on losartan and norvasc  resume as needed  -IV prn labetalol      #DM type 2   -holding oral regimen   -monitor BG    -SSI        DVT Prophylaxis: Lovenox   Diet: ADULT DIET;  Dysphagia - Pureed; 5 carb choices (75 gm/meal)  Code Status: DNR-CC      Updatd daughter  Out of bed  Avoid daytime naps      Lucas Correa MD,2/28/2023 7:20 AM

## 2023-02-28 NOTE — PROGRESS NOTES
Bilat wrist restraints + posey belt placed back on patient due to patient pulling at line and removing all clothes and his brief. Patient trying to climb out of bed. Restraints added. Patient cleaned up and repositioned.

## 2023-02-28 NOTE — PROGRESS NOTES
Inpatient Physical Therapy Evaluation    Unit: PCU  Date:  2/28/2023  Patient Name:    Ganesh Davila  Admitting diagnosis:  Acute respiratory failure with hypoxia [facility]) [J96.01]  Admit Date:  2/25/2023  Precautions/Restrictions/WB Status/ Lines/ Wounds/ Oxygen: Fall risk, Bed/chair alarm, Lines (IV and Purewick catheter), Confusion, and Telesitter, restraints recently D/C'd, per RN pt okay to be up in chair with chair alarm on and telesitter present in room. Treatment Time:  10:26 - 11:00  Treatment Number:  1  Timed Code Treatment Minutes: 24 minutes  Total Treatment Minutes:  34  minutes    Patient Stated Goals for Therapy:  none stated      Discharge Recommendations: SNF (likely transition to LTC)  DME needs for discharge: Defer to facility       Therapy recommendation for EMS Transport: requires transport by cot due to pt needs lift equipment for safe transfers and pt needs A x 2 for safe transfers    Therapy recommendations for staff:   Assist of 2 for transfers with use of JOSE E STEDY and gait belt to/from chair    History of Present Illness:   Per CHENG VASQUES H&P 2/25/23:  \"The patient is a [de-identified] y.o. male with pmhx of alzheimers dementia, CAD, DM who presented to [facility] ED with concerning for worsening altered mental status, patient apparently had some odd behavior last night, told family that he was going to kill them, they ultimately brought him in for placement, inability to care for him but he was communicating. He was medically cleared in ED and sent to inpatient [facility] psych. When I assessed patient he was unable to contribute to history, sleeping and snoring very loudly, hypoxic on RA 85%, sounded extremely congested, could only get him to respond and withdrawal from pain. Did not open his eyes or communicate. He does move all 4 extremities in response to pain. He will be transferred to medical floor. \"    Home Health S4 Level Recommendation:  NA    AM-PAC Mobility Score       AM-PAC Inpatient Mobility without Stair Climbing Raw Score : 8    Subjective  Patient lying reclined bed with family present (dtr). Pt agreeable to this PT session. Cognition    A&O Person   Able to follow 1 step commands, 50% of the time  Simple slow commands. Per dtr, pt has aphasia    Pain   No  Location:   Rating: NA /10  Pain Medicine Status: Denies need    Preadmission Environment:   Pt. Lives                                              Spouse and has required more assistance than spouse can provide; planning to discharge to facility  Home environment:                            two story home-full bath on both levels, bedroom upstairs  Steps to enter first floor:                     No steps  Steps to second floor/basement:        Full flight of 12-13 and hand rail: unilateral  Laundry:                                              unknown  Bathroom:                                           walk in shower, shower chair , and standard height toilet  Pt sleeps in a:                                     Flat bed  Equipment owned:                              none     Preadmission Status:  Pt. Able to drive:                                 No, wife drives  Pt. Fully independent with ADLs:       No, wife assists  Pt. Required assistance for:               Bathing, Dressing, Cleaning, Cooking, Laundry , and grocery shopping  Pt. independent for functional transfers and utilized No Device for mobility in home and No Device out in community-sometimes more unsteady and family assists  History of falls:                                                No  Home Health Services:                       None      Objective  Does this pt have an acute or acute on chronic diagnosis of CHF? No    Upper Extremity ROM/Strength  Please see OT evaluation. Lower Extremity ROM / Strength   AROM WFL: Yes  ROM limitations:     Formal strength testing deferred due to impaired cognition. BLE strength impaired.      Lower Extremity Sensation NT    Coordination and Tone  NT    Balance  Static Sitting:  Fair +; Min A   Dynamic Sitting:  Fair ; Mod A   Comments: EOB     Static Standing: Poor; Mod A  and 2 persons  Dynamic Standing: Poor; Mod A  and 2 persons  Comments: HH assist    Posture  Seated: Forward head and neck  Standing: Forward head and neck    Bed Mobility   Supine to Sit:    Mod A  and 2 persons  Sit to Supine:   Not Tested  Rolling:   Not Tested  Scooting in sitting: Mod A   Scooting in supine:  Not Tested  Bridging:  Not Tested    Transfer Training     Sit to stand: Mod A  and 2 persons  Stand to sit:   Mod A  and 2 persons  Bed to Chair:   Mod A  and 2 persons with use of gait belt and HH assist    Gait gait completed as indicated below  Distance:      4 ft  Deviations (firm surface/linoleum):  decreased janet, increased CARINA, step through pattern, shuffles, decreased foot clearance bilaterally, and decreased step length bilaterally  Assistive Device Used:    gait belt and HH assist  Level of Assist:    Mod A  and 2 persons  Comment: unsteady    Stair Training deferred, pt unsafe/ not appropriate to complete stairs at this time  # of Steps:   N/A  Level of Assist:  Not Tested  UE Support:  NA  Assistive Device:  N/A  Pattern:   N/A  Comments:      Therapeutic Exercises Initiated  Jaya deferred secondary to treatment focus on functional mobility  Supine:  N/A    Seated:  N/A    Standing:  N/A    Activity Tolerance   During therapy session noted pt with no adverse symptoms to activity     BP (mmHg) HR (bpm) SpO2 (%) on RA  Comments   Supine at rest  67 97    Seated at EOB       Standing       End of session         Positioning Needs   Pt reclined in chair, alarm set, positioned in proper neutral alignment and pressure relief provided. Call light provided and all needs within reach  Telesitter present in room  Dtr present in room and RN aware of pt status. Other Activities  None.     Patient/Family Education   Pt educated on role of inpatient PT, POC, importance of continued activity, DC recommendations, transfer techniques, and calling for assist with mobility. Assessment  Pt seen today for physical therapy Evaluation. Pt demonstrated decreased Activity tolerance, Balance, Safety, and Strength as well as decreased independence with Ambulation, Bed Mobility , and Transfers. Recommending SNF upon discharge as patient functioning well below baseline, demonstrates good rehab potential and unable to return home due to limited or no family support, inability to negotiate stairs to enter home/bedroom/bathroom, burden of care beyond caregiver ability, home environment not conducive to patient recovery, and limited safety awareness. Goals : To be met in 3 visits:  1). Independent with LE Ex x 10 reps    To be met in 6 visits:  1). Supine to/from sit: Min A   2). Sit to/from stand: Min A   3). Bed to chair: Min A   4). Gait: Ambulate 50 ft.  with  Min A  and use of LRAD (least restrictive assistive device)  5). Tolerate B LE exercises 3 sets of 10-15 reps    Rehabilitation Potential: Fair  Strengths for achieving goals include:   PLOF and Family Support   Barriers to achieving goals include:    Impaired cognition    Plan    To be seen 3-5 x / week  while in acute care setting for therapeutic exercises, bed mobility, transfers, progressive gait training, balance training, and family/patient education. Signature: Andrea Light, PT, DPT, OMT-C  #[id number]      If patient discharges from this facility prior to next visit, this note will serve as the Discharge Summary.

## 2023-02-28 NOTE — PROGRESS NOTES
Shift assessment completed. See flow sheet. Patient is on RA. Vitals are stable. Patient is confused. He is Alert but not oriented in any way. Daughter preset. Restraints removed at this time. Call light within reach.

## 2023-02-28 NOTE — PROGRESS NOTES
Inpatient Occupational Therapy Evaluation and Treatment    Unit: PCU  Date:  2/28/2023  Patient Name:    Rupal Baldwin  Admitting diagnosis:  Acute respiratory failure with hypoxia [facility]) [J96.01]  Admit Date:  2/25/2023  Precautions/Restrictions/WB Status/ Lines/ Wounds/ Oxygen: Fall risk, Bed/chair alarm, Lines (IV and Purewick catheter), Confusion, Continuous pulse oximetry, and Telesitter    Treatment Time:  9970-0886  Treatment Number:  1  Timed Code Treatment Minutes: 24 minutes  Total Treatment Minutes:  34  minutes    Patient Goals for Therapy: unable to state        Discharge Recommendations: SNF-likely transition to LTC  DME needs for discharge: Defer to facility       Therapy recommendations for staff:   Assist of 2 for transfers with use of JOSE E STEDY and gait belt to/from [facility]  to/from chair; can also utilize assist of 2 with gait belt and handhold assist for pivot transfers as patient status allows    History of Present Illness: per CHENG VASQUES H&P 2/25/23:     \"The patient is a [de-identified] y.o. male with pmhx of alzheimers dementia, CAD, DM who presented to [facility] ED with concerning for worsening altered mental status, patient apparently had some odd behavior last night, told family that he was going to kill them, they ultimately brought him in for placement, inability to care for him but he was communicating. He was medically cleared in ED and sent to inpatient [facility]. When I assessed patient he was unable to contribute to history, sleeping and snoring very loudly, hypoxic on RA 85%, sounded extremely congested, could only get him to respond and withdrawal from pain. Did not open his eyes or communicate. He does move all 4 extremities in response to pain. He will be transferred to medical floor. \"    Home Health S4 Level Recommendation:  NA    AM-PAC Score:       Subjective:  Patient lying supine in bed with family at bedside. Pt agreeable to this OT session.      Cognition:    A&O Person - limited by aphasia  Able to follow 1 step commands, 50% of the time    Pain:   No  Location:   Rating: NA /10  Pain Medicine Status: No request made    Preadmission Environment:   Pt. Lives     Spouse and has required more assistance than spouse can provide; planning to discharge to facility  Home environment:    two story home-full bath on both levels, bedroom upstairs  Steps to enter first floor:   No steps  Steps to second floor/basement: Full flight of 12-13 and hand rail: unilateral  Laundry:     unknown  Bathroom:     walk in shower, shower chair , and standard height toilet  Pt sleeps in a:    Flat bed  Equipment owned:    none    Preadmission Status:  Pt. Able to drive:    No, wife drives  Pt. Fully independent with ADLs:  No, wife assists  Pt. Required assistance for:   Bathing, Dressing, Cleaning, Cooking, Laundry , and grocery shopping  Pt. independent for functional transfers and utilized No Device for mobility in home and No Device out in community-sometimes more unsteady and family assists  History of falls:    No  Home Health Services:  None    Objective:  Does this pt have an acute or acute on chronic diagnosis of CHF? No    Upper Extremity ROM:    Appears WFL's    Upper Extremity Strength:    BUE strength impaired but not formally assessed w/ MMT    Upper Extremity Sensation:    WFL    Upper Extremity Proprioception:  WFL    Coordination and Tone  WFL    Balance:  Sitting:    SBA  Standing:    Min A , 2 person, and with gait belt and B handhold assist      Bed Mobility:   Supine to Sit:    Mod A  and 2 person  Sit to Supine:   Not Tested  Rolling:   Not Tested  Scooting in sitting: Not Tested  Scooting in supine:   Mod A     Transfers:    Sit to stand:    Min A  and 2 person  Stand to sit:    Min A  and 2 person  Bed to chair:     Mod A , 2 person, and gait belt, B handhold assist  Bed/ chair to standard toilet:  Not Tested  Bed/chair to [facility]:   Not Tested  Functional Mobility:   Not Tested    See PT note for gait analysis. ADLs:  Dressing:      UE:   Max A don gown  LE:    Max A don socks    Bathing:    UE:  Not Tested  LE:  Not Tested    Eating:   Not Tested    Toileting:  Not Tested-purewick in place    Grooming/hygiene: Not Tested    Activity Tolerance:   Pt completed therapy session with no adverse symptoms     BP (mmHg) HR (bpm) SpO2 (%) on RA Comments   Supine at rest  67 97%    Seated at EOB       Standing       End of session         Positioning Needs:   Pt reclined in chair, alarm set, call light provided and all needs within reach . Ther Ex / Activities Initiated:   N/A    Patient/Family Education:   Pt educated on role of inpatient OT, plan of care, importance of continued activity, DC recommendations, safety awareness, and transfer techniques. Assessment:    Pt seen for Occupational therapy evaluation in acute care setting. Pt demonstrated decreased Activity tolerance, ADLs, IADLs, Bed mobility, Safety Awareness, Transfers, and Cognition. Pt functioning below baseline and will likely benefit from skilled occupational therapy services to maximize safety and independence. Recommending SNF upon discharge as patient functioning well below baseline, demonstrates good rehab potential and unable to return home due to limited or no family support, inability to negotiate stairs to enter home/bedroom/bathroom, burden of care beyond caregiver ability, and limited safety awareness. Goal(s) : To be met in 3 Visits:  Bed to toilet/BSC: Min A  Pt will complete 3/3 CHF goals     N/A    To be met in 5 Visits:  Supine to/from Sit in preparation for ADL task:   Min A  Toileting        Mod A  Grooming       Mod A  Upper Body Dressing: Mod A  Lower Body Dressing:       Mod A  Pt to demonstrate UE therapeutic exs x 15 reps with minimal cues    Rehabilitation Potential: Good  Strengths for achieving goals include: Family Support   Barriers to achieving goals include:  Complexity of condition    Plan: To be seen 3-5 x/wk while in acute care setting for therapeutic exercises, bed mobility, transfers, family/patient education, ADL/IADL retraining, and energy conservation training.     Electronically signed by Fredrick Murphy OT on 2/28/2023 at 10:31 AM      If patient discharges from this facility prior to next visit, this note will serve as the Discharge Summary

## 2023-03-01 LAB
GLUCOSE BLD-MCNC: 114 MG/DL (ref 70–99)
GLUCOSE BLD-MCNC: 116 MG/DL (ref 70–99)
GLUCOSE BLD-MCNC: 141 MG/DL (ref 70–99)
GLUCOSE BLD-MCNC: 160 MG/DL (ref 70–99)
GLUCOSE BLD-MCNC: 164 MG/DL (ref 70–99)
GLUCOSE BLD-MCNC: 175 MG/DL (ref 70–99)
PERFORMED ON: ABNORMAL

## 2023-03-01 PROCEDURE — 2060000000 HC ICU INTERMEDIATE R&B

## 2023-03-01 PROCEDURE — 2580000003 HC RX 258

## 2023-03-01 PROCEDURE — 6370000000 HC RX 637 (ALT 250 FOR IP)

## 2023-03-01 PROCEDURE — 99232 SBSQ HOSP IP/OBS MODERATE 35: CPT | Performed by: INTERNAL MEDICINE

## 2023-03-01 PROCEDURE — 6360000002 HC RX W HCPCS

## 2023-03-01 PROCEDURE — 2500000003 HC RX 250 WO HCPCS

## 2023-03-01 PROCEDURE — 6370000000 HC RX 637 (ALT 250 FOR IP): Performed by: INTERNAL MEDICINE

## 2023-03-01 PROCEDURE — 92526 ORAL FUNCTION THERAPY: CPT

## 2023-03-01 RX ORDER — DIVALPROEX SODIUM 125 MG/1
125 CAPSULE, COATED PELLETS ORAL
Status: DISCONTINUED | OUTPATIENT
Start: 2023-03-01 | End: 2023-03-03 | Stop reason: HOSPADM

## 2023-03-01 RX ORDER — QUETIAPINE FUMARATE 25 MG/1
25 TABLET, FILM COATED ORAL NIGHTLY
Status: DISCONTINUED | OUTPATIENT
Start: 2023-03-01 | End: 2023-03-01

## 2023-03-01 RX ORDER — DIVALPROEX SODIUM 250 MG/1
250 TABLET, EXTENDED RELEASE ORAL NIGHTLY
Status: DISCONTINUED | OUTPATIENT
Start: 2023-03-01 | End: 2023-03-03 | Stop reason: HOSPADM

## 2023-03-01 RX ORDER — QUETIAPINE FUMARATE 25 MG/1
25 TABLET, FILM COATED ORAL NIGHTLY
Status: DISCONTINUED | OUTPATIENT
Start: 2023-03-01 | End: 2023-03-03 | Stop reason: HOSPADM

## 2023-03-01 RX ADMIN — DIVALPROEX SODIUM 250 MG: 250 TABLET, EXTENDED RELEASE ORAL at 19:03

## 2023-03-01 RX ADMIN — MULTIPLE VITAMINS W/ MINERALS TAB 1 TABLET: TAB at 08:32

## 2023-03-01 RX ADMIN — ACETAMINOPHEN 650 MG: 325 TABLET ORAL at 22:42

## 2023-03-01 RX ADMIN — LABETALOL HYDROCHLORIDE 10 MG: 5 INJECTION, SOLUTION INTRAVENOUS at 03:15

## 2023-03-01 RX ADMIN — AMPICILLIN SODIUM AND SULBACTAM SODIUM 3000 MG: 2; 1 INJECTION, POWDER, FOR SOLUTION INTRAMUSCULAR; INTRAVENOUS at 05:55

## 2023-03-01 RX ADMIN — ONDANSETRON 4 MG: 2 INJECTION INTRAMUSCULAR; INTRAVENOUS at 22:42

## 2023-03-01 RX ADMIN — QUETIAPINE FUMARATE 25 MG: 25 TABLET ORAL at 18:59

## 2023-03-01 RX ADMIN — AMPICILLIN SODIUM AND SULBACTAM SODIUM 3000 MG: 2; 1 INJECTION, POWDER, FOR SOLUTION INTRAMUSCULAR; INTRAVENOUS at 13:30

## 2023-03-01 RX ADMIN — AMPICILLIN SODIUM AND SULBACTAM SODIUM 3000 MG: 2; 1 INJECTION, POWDER, FOR SOLUTION INTRAMUSCULAR; INTRAVENOUS at 17:32

## 2023-03-01 RX ADMIN — POTASSIUM BICARBONATE 20 MEQ: 782 TABLET, EFFERVESCENT ORAL at 08:32

## 2023-03-01 RX ADMIN — DIVALPROEX SODIUM 125 MG: 125 CAPSULE, COATED PELLETS ORAL at 08:39

## 2023-03-01 RX ADMIN — CYANOCOBALAMIN TAB 500 MCG 500 MCG: 500 TAB at 08:32

## 2023-03-01 RX ADMIN — AMPICILLIN SODIUM AND SULBACTAM SODIUM 3000 MG: 2; 1 INJECTION, POWDER, FOR SOLUTION INTRAMUSCULAR; INTRAVENOUS at 23:57

## 2023-03-01 RX ADMIN — AMLODIPINE BESYLATE 2.5 MG: 2.5 TABLET ORAL at 21:15

## 2023-03-01 RX ADMIN — SODIUM CHLORIDE, PRESERVATIVE FREE 10 ML: 5 INJECTION INTRAVENOUS at 08:34

## 2023-03-01 RX ADMIN — Medication 400 UNITS: at 08:35

## 2023-03-01 RX ADMIN — ASPIRIN 81 MG: 81 TABLET, COATED ORAL at 08:32

## 2023-03-01 RX ADMIN — ATORVASTATIN CALCIUM 40 MG: 40 TABLET, FILM COATED ORAL at 21:15

## 2023-03-01 RX ADMIN — SODIUM CHLORIDE, PRESERVATIVE FREE 10 ML: 5 INJECTION INTRAVENOUS at 21:15

## 2023-03-01 ASSESSMENT — PAIN SCALES - WONG BAKER
WONGBAKER_NUMERICALRESPONSE: 0
WONGBAKER_NUMERICALRESPONSE: 2
WONGBAKER_NUMERICALRESPONSE: 0

## 2023-03-01 ASSESSMENT — PAIN SCALES - GENERAL: PAINLEVEL_OUTOF10: 2

## 2023-03-01 NOTE — PROGRESS NOTES
Prn po zyprexa given per mar. Family at bed side. In attempt to help patient get to sleep and prevent any distractions to the patient we went ahead and hung patients antibiotic.

## 2023-03-01 NOTE — PROGRESS NOTES
Bedside report and transfer of care given to [facility]. Pt currently resting in bed with the call light within reach. Pt denies any other care needs at this time. Pt stable at this time.

## 2023-03-01 NOTE — PROGRESS NOTES
Daughter went home, she was unable to get any rest. Patient was continuously pulling things and trying to climb out of bed. We were able to clean patient up and do complete bed change. Patient was angry and violent with staff, we were able to calm patient down by getting him up to the chair and giving him a warm blanket.

## 2023-03-01 NOTE — PROGRESS NOTES
Patient kept trying to get out of his chair, we placed him back in bed and he kept trying to climb over the bed rail on his right. Staff has been in room multiple times assisting with patient , unable to get him to calm down. Left side was exit side. [place] belt was placed.

## 2023-03-01 NOTE — PROGRESS NOTES
Speech Language Pathology  Dysphagia Treatment/Follow-Up Note  Facility/Department: [facility] [place] [facility]      Recommendations: IDDSI 4 Puree Solids; IDDSI 0 Thin Liquids - straws OK; Meds crushed in puree as able, *only when pt is most alert; hold all PO intake if reduced MONICA  Risk Management: upright for all intake, stay upright for at least 30 mins after intake, small bites/sips, assist feed, downgrade to mildly thick if s/s of aspiration develop, oral care q4 hrs to reduce adverse affects in the event of aspiration, increase physical mobility as able, consider removing bilateral wrist restraints during meals to enhance pt's ability to self-feed if able, alternate bites/sips, slow rate of intake, general GERD precautions, general aspiration precautions, and hold PO and contact SLP if s/s of aspiration or worsening respiratory status develop. Cheli Sandoval  : 1940 (82 y.o.)   MRN: [id number]  ROOM: [place] [address]  ADMISSION DATE: 2023  PATIENT DIAGNOSIS(ES): Acute respiratory failure with hypoxia (HCC) [J96.01]  No Known Allergies    DATE ONSET: 2023    Pain: The patient does not complain of pain     Current Diet: ADULT DIET; Dysphagia - Pureed; 5 carb choices (75 gm/meal)    Diet Tolerance:  Pt currently on pureed diet with thin liquids (initiated )     Dysphagia Treatment and Impressions:  Subjective: Pt seen in room at bedside with RN permission Suzan Patterson). Pt upright in bedside chair with eyes closed, pt's daughter present. She reported that pt is more fatigued today than yesterday. Per chart and RN, pt agitated overnight, required administered of medication. Pt's daughter reported that the pt had coughing \"with all sips of tea yesterday\", however denied dysphagia with further PO intake (liquids or solids)  RN Report/Chart Review: No reports of dysphagia per chart review. Patient tolerance: Pt currently on modified diet (pureed), ate ~75% of breakfast per pt's daughter. Respiratory Status: Pt on RA, RR 16/min. Liquid PO Trials:   IDDSI 0 Thin:  ASHLEY at this time    Solid PO Trials  IDDSI 4 Puree:  x1 1/2 tsp only;  no anterior bolus loss , suspect reduced/impaired A-P bolus transit with significant lingual pumping noted, suspect premature bolus loss into pharynx, significantly delayed swallow initiation (>1 minute despite max multimodality cues, use of empty tsp also used to facilitate swallow initiation) oral clearance grossly WFL, no clinical s/s of aspiration. PO trials discontinued at this time given pt's fatigue and significant swallow delay despite max multimodality cueing. Education: SLP edu pt re: Role of SLP, Rationale for dysphagia tx, Recommended compensatory strategies, Aspiration precautions, and Importance of oral care to reduce adverse affects in the event of aspiration. Pt unable to demonstrate evidence of learning. Pt's daughter and RN aware of recommendations  Assessment: Continue current pureed diet with thin liquids with PO intake only when pt is most alert; Limited PO trials this date d/t pt's fatigue (per RN/chart, pt was agitated overnight and required administration of medications, now lethargic). Poor carryover of recommended compensatory strategies    Recommendations: SLP recommends to continue IDDSI 4 Puree Solids; IDDSI 0 Thin Liquids - straws OK;  Meds crushed in puree as able with PO intake only when most alert; hold all PO intake if reduced MONICA  Risk Management: upright for all intake, stay upright for at least 30 mins after intake, small bites/sips, assist feed, downgrade to mildly thick if s/s of aspiration develop, oral care q4 hrs to reduce adverse affects in the event of aspiration, increase physical mobility as able, consider removing bilateral wrist restraints during meals to enhance pt's ability to self-feed if able, alternate bites/sips, slow rate of intake, general GERD precautions, general aspiration precautions, and hold PO and contact SLP if s/s of aspiration or worsening respiratory status develop. Flex Townsend Dysphagia Goals:  Timeframe for Long-term Goals: (7 days 3/5/23)  Goal 1: The patient will tolerate least restrictive diet with no clinical s/s of aspiration or worsening respiratory/pulmonary status 03/01: ongoing     Short-term Goals  Timeframe for Short-term Goals: (5 days 3/3/23)  Goal 1: The patient will tolerate therapeutic diet upgrade trials with no clinical s/s of aspiration 5/5 03/01 ongoing, limited PO trials observed this date, see above. Goal 2: The patient/caregiver will demonstrate understanding of compensatory swallow strategies, for improved swallow safety 03/01: ongoing; pt unable to demonstrate evidence of learning. Pt's daughter verbalized understanding, would benefit from ongoing education   Goal 3: The patient will tolerate repeat BSE when able for ongoing assessment 03/01: ongoing; limited PO trials this date d/t pt's reduced MONICA  Goal 4: The patient will tolerate instrumental assessment when able 03/01: ongoing; not appropriate at this time     Speech/Language/Cog Goals: N/A     Recommendations:  Solid Consistency: IDDSI 4 Puree Solids  Liquid Consistency: IDDSI 0 Thin Liquids - straws OK  Medication: Meds crushed in puree as able  *PO intake only when most alert; hold all PO if reduced MONICA    Plan:    Continued Dysphagia treatment with goals per plan of care. Discharge Recommendations: TBD    If pt discharges from hospital prior to Speech/Swallowing discharge, this note serves as tx and discharge summary.      Total Treatment Time / Charges     Time in Time out Total Time / units   Cognitive Tx         Speech Tx      Dysphagia Tx [id number] 20 min / 1 unit     Signature:  Ishmael Melgoza M.S. [id number] [facility], [place]  Speech-language pathologist  [phone]

## 2023-03-01 NOTE — FLOWSHEET NOTE
02/28/23 2015   Vital Signs   Temp 97.4 °F (36.3 °C)   Temp Source Oral   Heart Rate 67   Heart Rate Source Monitor   Resp 14   BP (!) 160/75   MAP (Calculated) 103   BP Location Left upper arm   BP Method Automatic   Patient Position Semi fowlers   Level of Consciousness 0   MEWS Score 0   Pain Assessment   Pain Assessment None - Denies Pain   Oxygen Therapy   SpO2 98 %   Pulse Oximeter Device Mode Continuous   O2 Device None (Room air)     Shift assessment completed see flow sheet. Patient in bed alert and oriented to self. Patient on room air, showing no signs of distress. Daughter is at bedside. Evening medications given per order. Patient tolerated well. No restraint orders in place at this time. No other needs at this time. Call light in reach. Bed alarm on. Tele at bedside.

## 2023-03-01 NOTE — PROGRESS NOTES
Progress Note    Admit Date:  2/25/2023    -pmhx of dementia    Transferred from psychiatry for confusion and hypoxia, abn troponin    Subjective:-    Mr. Lanie Garcia was able to come off restraints in the day time but  increasing agitation again  last night needing restraints  Given seroquel and zyprexa and now sleepy   Daughter at bedside    Pt seen up in bed, awake, alert and confused with baseline dementia  Remains on RA  No fevers     Objective:   Patient Vitals for the past 4 hrs:   BP Pulse   03/01/23 0500 (!) 149/60 58            Intake/Output Summary (Last 24 hours) at 3/1/2023 0749  Last data filed at 3/1/2023 0511  Gross per 24 hour   Intake --   Output 1900 ml   Net -1900 ml         Physical Exam:    General:  elderly male, confused and awake, alert   Mucous Membranes:  Pink , anicteric  Neck: No JVD, no carotid bruit, no thyromegaly  Chest:  Clear to auscultation bilaterally, diminished in bases  Cardiovascular:  RRR S1S2 heard, no murmurs or gallops  Abdomen:  Soft, undistended, non tender, no organomegaly, BS present  Extremities: No edema or cyanosis.  Distal pulses well felt  Neurological : non focal but very confused         Medications:  QUEtiapine, 12.5 mg, Nightly  potassium bicarb-citric acid, 20 mEq, Daily  divalproex, 125 mg, 2 times per day  vitamin E, 400 Units, Daily  vitamin B-12, 500 mcg, Daily  therapeutic multivitamin-minerals, 1 tablet, Daily with breakfast  [Held by provider] losartan, 100 mg, Daily  [Held by provider] donepezil, 10 mg, Nightly  atorvastatin, 40 mg, Nightly  aspirin, 81 mg, Daily  amLODIPine, 2.5 mg, Nightly  sodium chloride flush, 5-40 mL, 2 times per day  enoxaparin, 40 mg, Daily  insulin lispro, 0-4 Units, Q4H  ampicillin-sulbactam, 3,000 mg, Q6H    PRN Medications:  perflutren lipid microspheres, 1.5 mL, ONCE PRN  albuterol, 2.5 mg, Q6H PRN  OLANZapine, 5 mg, Q8H PRN   Or  OLANZapine (ZyPREXA) in sterile water IntraMUSCular, 5 mg, Q8H PRN  benztropine mesylate, 2 mg, BID PRN  aluminum & magnesium hydroxide-simethicone, 30 mL, Q6H PRN  sodium chloride flush, 10 mL, PRN  sodium chloride, , PRN  potassium chloride, 40 mEq, PRN   Or  potassium alternative oral replacement, 40 mEq, PRN   Or  potassium chloride, 10 mEq, PRN  magnesium sulfate, 1,000 mg, PRN  ondansetron, 4 mg, Q8H PRN   Or  ondansetron, 4 mg, Q6H PRN  polyethylene glycol, 17 g, Daily PRN  acetaminophen, 650 mg, Q6H PRN   Or  acetaminophen, 650 mg, Q6H PRN  glucose, 4 tablet, PRN  dextrose bolus, 125 mL, PRN   Or  dextrose bolus, 250 mL, PRN  glucagon (rDNA), 1 mg, PRN  dextrose, , Continuous PRN  labetalol, 10 mg, Q6H PRN        Data:  CBC:   Recent Labs     02/27/23 0523 02/28/23  0522   WBC 11.0 8.6   HGB 14.3 12.6*   HCT 41.8 36.2*   MCV 91.2 90.0   PLT 79* 80*       BMP:   Recent Labs     02/27/23 0523 02/28/23  0522    139   K 3.7 3.0*    104   CO2 30 27   BUN 27* 21*   CREATININE 1.1 1.0       LIVER PROFILE:   No results for input(s): AST, ALT, LIPASE, BILIDIR, BILITOT, ALKPHOS in the last 72 hours. Invalid input(s): AMYLASE,  ALB    PT/INR: No results for input(s): PROTIME, INR in the last 72 hours. CULTURES  COVID and Flu not detected   Sputum culture pending      RADIOLOGY  XR CHEST PORTABLE   Final Result   Bands of opacity in the lower lungs bilaterally, new when compared to the   previous exam.  These are probably atelectasis, but pneumonia or aspiration   remains a differential.           CT chest 2/26/23    1. Dense mucous plugging within both lower lobes, with partial consolidation   of both lower lobes, which could represent either aspiration or pneumonia. 2. Cholelithiasis.        Assessment/Plan:    #Acute encephalopathy - likely toxic with meds  -2/2 to medication given on adm, Given ativan and geodon in ED  -held depakote and other sedatives now with severe drowsiness  - improved drowsiness but remains agitated with baseline dementia issues  - resume depakote at lower dose and increase slowly as tolerated    -had negative CT head in ED   - increase seroquel nightly to 25 mg and increase depakote to 250 mg nightly   - psychiatry following  - updated daughter at bedside regarding plan of care and plans for rehab       #Acute hypoxic respiratory failure   - sec to atelectasis vs aspiration pneumonia   -requiring 5 L O2 while sleeping initially     -CXR was neg in ED   --CT chest now with dense mucus plugging, consolidations possible aspiration or pna   -respiratory team were able to suction significant amount of mucus out   -IV unasyn started   -procalc, LA  elevated  -weaned to RA , SLP eval done   -pulm consulted      #Alzheimers dementia with behavioral disturbances   - per psychiatry team  -  resume depakote and seroquel as above     #Elevated Cr   -Cr 1.4 today  -improved with IVF      #Elevated troponin   0.13--> 0.11 >0.09  -EKG without acute ischemic changes   -on ASA, statin  -cardiology consulted and no plans for workup given severe agitation issues     #CAD   -s/p CABG   -on ASA, statin      #HTN   -on losartan and norvasc  resume as needed  -IV prn labetalol      #DM type 2   -holding oral regimen   -monitor BG    -SSI        DVT Prophylaxis: Lovenox   Diet: ADULT DIET;  Dysphagia - Pureed; 5 carb choices (75 gm/meal)  Code Status: DNR-CC      Updatd daughter  Out of bed  Avoid daytime naps      Lucas Correa MD,3/1/2023 7:49 AM

## 2023-03-01 NOTE — PROGRESS NOTES
Hand off report given to  David Parra RN. Patient is stable showing no signs of distress and has no current needs at this time. Call light is in reach and bed is in lowest position. Care is transferred at this time.

## 2023-03-01 NOTE — PROGRESS NOTES
Blood pressure (!) 171/73, pulse 68, temperature 97.3 °F (36.3 °C), temperature source Oral, resp. rate 18, height 5' 4\" (1.626 m), weight 130 lb 14.4 oz (59.4 kg), SpO2 99 %.    Prn labetolol given per mar for bp with systolic >170

## 2023-03-01 NOTE — CARE COORDINATION
INTERDISCIPLINARY PLAN OF CARE CONFERENCE    Date/Time: 3/1/2023 2:17 PM  Completed by: Rufina Nesbitt RN, Case Management      Patient Name:  Pat Navarro  YOB: 1940  Admitting Diagnosis: Acute respiratory failure with hypoxia [facility]) [J96.01]     Admit Date/Time:  2/25/2023  4:43 PM    Chart reviewed. Interdisciplinary team contacted or reviewed plan related to patient progress and discharge plans. Disciplines included Case Management, Nursing, and Dietitian. Current Status: IP 03/25/2023  PT/OT recommendation for discharge plan of care: [facility] 8  Discharge Recommendations: SNF (likely transition to LTC)  DME needs for discharge: Defer to facility     Expected D/C Disposition:  Skilled nursing facility  Confirmed plan with patient's daughter, caregiver Carolynn Torres  Discharge Plan Comments: Chart reviewed. Met with pt (advanced dementia) and daughter Darcy Ramos at bedside and explained the role of the CM. Pt lives w/sp and daughter in [address] [place] [address]. Goal is for placement in memory care at [place] [address]. No beds available at [facility]. Referral to [facility] memory care. Await review. Will need pre-cert.  +CM following    Home O2 in place on admit: No  Pt informed of need to bring portable home O2 tank on day of discharge for nursing to connect prior to leaving:  Not Indicated  Verbalized agreement/Understanding:  Not Indicated

## 2023-03-01 NOTE — PROGRESS NOTES
Patient was hanging out of bed pulling his iv when I attempted to help him into bed patient began swinging at me, hitting me other staff came into the room and bilateral wrist restraints were added.

## 2023-03-01 NOTE — PROGRESS NOTES
Shift assessment completed. See flow sheet. Medications given. Vitals are stable. Patient is on RA. Restraints removed for trial period. Patient ate some breakfast. Patient is Alert but not oriented. Tele sitter present. Call light within reach.

## 2023-03-02 LAB
ANION GAP SERPL CALCULATED.3IONS-SCNC: 10 MMOL/L (ref 3–16)
BUN BLDV-MCNC: 20 MG/DL (ref 7–20)
CALCIUM SERPL-MCNC: 8.8 MG/DL (ref 8.3–10.6)
CHLORIDE BLD-SCNC: 105 MMOL/L (ref 99–110)
CO2: 27 MMOL/L (ref 21–32)
CREAT SERPL-MCNC: 1 MG/DL (ref 0.8–1.3)
GFR SERPL CREATININE-BSD FRML MDRD: >60 ML/MIN/{1.73_M2}
GLUCOSE BLD-MCNC: 107 MG/DL (ref 70–99)
GLUCOSE BLD-MCNC: 113 MG/DL (ref 70–99)
GLUCOSE BLD-MCNC: 121 MG/DL (ref 70–99)
GLUCOSE BLD-MCNC: 128 MG/DL (ref 70–99)
GLUCOSE BLD-MCNC: 180 MG/DL (ref 70–99)
GLUCOSE BLD-MCNC: 206 MG/DL (ref 70–99)
GLUCOSE BLD-MCNC: 97 MG/DL (ref 70–99)
PERFORMED ON: ABNORMAL
PERFORMED ON: NORMAL
POTASSIUM SERPL-SCNC: 3.9 MMOL/L (ref 3.5–5.1)
SARS-COV-2, NAAT: NOT DETECTED
SODIUM BLD-SCNC: 142 MMOL/L (ref 136–145)

## 2023-03-02 PROCEDURE — 97116 GAIT TRAINING THERAPY: CPT

## 2023-03-02 PROCEDURE — 6360000002 HC RX W HCPCS

## 2023-03-02 PROCEDURE — 97535 SELF CARE MNGMENT TRAINING: CPT

## 2023-03-02 PROCEDURE — 97110 THERAPEUTIC EXERCISES: CPT

## 2023-03-02 PROCEDURE — 6370000000 HC RX 637 (ALT 250 FOR IP): Performed by: INTERNAL MEDICINE

## 2023-03-02 PROCEDURE — 6370000000 HC RX 637 (ALT 250 FOR IP)

## 2023-03-02 PROCEDURE — 80048 BASIC METABOLIC PNL TOTAL CA: CPT

## 2023-03-02 PROCEDURE — 2580000003 HC RX 258

## 2023-03-02 PROCEDURE — 99232 SBSQ HOSP IP/OBS MODERATE 35: CPT | Performed by: INTERNAL MEDICINE

## 2023-03-02 PROCEDURE — 87635 SARS-COV-2 COVID-19 AMP PRB: CPT

## 2023-03-02 PROCEDURE — 2060000000 HC ICU INTERMEDIATE R&B

## 2023-03-02 PROCEDURE — 97530 THERAPEUTIC ACTIVITIES: CPT

## 2023-03-02 RX ORDER — AMLODIPINE BESYLATE 2.5 MG/1
2.5 TABLET ORAL NIGHTLY
Qty: 30 TABLET | Refills: 3 | Status: ON HOLD
Start: 2023-03-02 | End: 2023-03-08 | Stop reason: HOSPADM

## 2023-03-02 RX ORDER — DIVALPROEX SODIUM 125 MG/1
125 CAPSULE, COATED PELLETS ORAL
Qty: 60 CAPSULE | Refills: 3 | Status: ON HOLD
Start: 2023-03-03 | End: 2023-03-08 | Stop reason: HOSPADM

## 2023-03-02 RX ORDER — QUETIAPINE FUMARATE 25 MG/1
25 TABLET, FILM COATED ORAL NIGHTLY
Qty: 60 TABLET | Refills: 3 | Status: ON HOLD
Start: 2023-03-02 | End: 2023-03-08 | Stop reason: HOSPADM

## 2023-03-02 RX ORDER — LOSARTAN POTASSIUM 100 MG/1
50 TABLET ORAL DAILY
Qty: 30 TABLET | Refills: 3
Start: 2023-03-02

## 2023-03-02 RX ORDER — DIVALPROEX SODIUM 250 MG/1
250 TABLET, EXTENDED RELEASE ORAL NIGHTLY
Qty: 30 TABLET | Refills: 3 | Status: ON HOLD
Start: 2023-03-02 | End: 2023-03-08 | Stop reason: HOSPADM

## 2023-03-02 RX ADMIN — POTASSIUM BICARBONATE 20 MEQ: 782 TABLET, EFFERVESCENT ORAL at 08:22

## 2023-03-02 RX ADMIN — CYANOCOBALAMIN TAB 500 MCG 500 MCG: 500 TAB at 08:22

## 2023-03-02 RX ADMIN — QUETIAPINE FUMARATE 25 MG: 25 TABLET ORAL at 19:00

## 2023-03-02 RX ADMIN — INSULIN LISPRO 2 UNITS: 100 INJECTION, SOLUTION INTRAVENOUS; SUBCUTANEOUS at 12:04

## 2023-03-02 RX ADMIN — AMLODIPINE BESYLATE 2.5 MG: 2.5 TABLET ORAL at 20:03

## 2023-03-02 RX ADMIN — ATORVASTATIN CALCIUM 40 MG: 40 TABLET, FILM COATED ORAL at 20:03

## 2023-03-02 RX ADMIN — MULTIPLE VITAMINS W/ MINERALS TAB 1 TABLET: TAB at 08:22

## 2023-03-02 RX ADMIN — SODIUM CHLORIDE, PRESERVATIVE FREE 10 ML: 5 INJECTION INTRAVENOUS at 22:09

## 2023-03-02 RX ADMIN — ENOXAPARIN SODIUM 40 MG: 100 INJECTION SUBCUTANEOUS at 08:22

## 2023-03-02 RX ADMIN — AMPICILLIN SODIUM AND SULBACTAM SODIUM 3000 MG: 2; 1 INJECTION, POWDER, FOR SOLUTION INTRAMUSCULAR; INTRAVENOUS at 04:58

## 2023-03-02 RX ADMIN — DIVALPROEX SODIUM 125 MG: 125 CAPSULE, COATED PELLETS ORAL at 06:19

## 2023-03-02 RX ADMIN — AMPICILLIN SODIUM AND SULBACTAM SODIUM 3000 MG: 2; 1 INJECTION, POWDER, FOR SOLUTION INTRAMUSCULAR; INTRAVENOUS at 10:57

## 2023-03-02 RX ADMIN — DIVALPROEX SODIUM 250 MG: 250 TABLET, EXTENDED RELEASE ORAL at 19:00

## 2023-03-02 RX ADMIN — Medication 400 UNITS: at 08:23

## 2023-03-02 RX ADMIN — ASPIRIN 81 MG: 81 TABLET, COATED ORAL at 08:22

## 2023-03-02 RX ADMIN — AMPICILLIN SODIUM AND SULBACTAM SODIUM 3000 MG: 2; 1 INJECTION, POWDER, FOR SOLUTION INTRAMUSCULAR; INTRAVENOUS at 22:46

## 2023-03-02 ASSESSMENT — PAIN SCALES - GENERAL
PAINLEVEL_OUTOF10: 0

## 2023-03-02 ASSESSMENT — PAIN SCALES - WONG BAKER: WONGBAKER_NUMERICALRESPONSE: 0

## 2023-03-02 NOTE — PROGRESS NOTES
Pt attempted to get out of bed, Tele sitter notified this nurse. Pt assisted back to bed and was reoriented to situation. Pt was incontinent of urine, pt cleaned. Pt calmed down and is cooperative. Pt shaved and hair washed. Pt is now resting in bed with eyes closed.

## 2023-03-02 NOTE — FLOWSHEET NOTE
03/02/23 0733   Vital Signs   Temp 97.1 °F (36.2 °C)   Temp Source Axillary   Heart Rate 55   Heart Rate Source Monitor   Resp 19   /70   MAP (Calculated) 92   BP Location Right upper arm   BP Method Automatic   Patient Position Semi fowlers   Level of Consciousness 0   MEWS Score 1   Pain Assessment   Pain Assessment Grace-Baker FACES   Grace-Baker Pain Rating 0   Oxygen Therapy   SpO2 97 %   O2 Device None (Room air)   O2 Flow Rate (L/min) 0 L/min       Pt assessment complete; see flow sheets. Vitals completed. Meds given per MAR. Pt up to chair with assist. Set up for breakfast; Pt feeding self. Restraints removed x24 hrs now. No s/s of distress. Pt stable.     Moisés Sparrow RN

## 2023-03-02 NOTE — PROGRESS NOTES
Progress Note    Admit Date:  2/25/2023    -pmhx of dementia    Transferred from psychiatry for confusion and hypoxia, abn troponin    Subjective:-    Mr. Marcio Paul was able to come off restraints since yesterday   Some agitation issues overnight but able to be calmed down by nurses    Slept well  Up iin chair and fed himself today . Remains confused  More redirectable and cooperative     Remains on RA  No fevers     Objective:   Patient Vitals for the past 4 hrs:   BP Temp Temp src Pulse Resp SpO2   03/02/23 0733 135/70 97.1 °F (36.2 °C) Axillary 55 19 97 %            Intake/Output Summary (Last 24 hours) at 3/2/2023 1553  Last data filed at 3/1/2023 1722  Gross per 24 hour   Intake 180 ml   Output 702 ml   Net -522 ml         Physical Exam:    General:  elderly male, confused and awake, alert   Mucous Membranes:  Pink , anicteric  Neck: No JVD, no carotid bruit, no thyromegaly  Chest:  Clear to auscultation bilaterally, diminished in bases  Cardiovascular:  RRR S1S2 heard, no murmurs or gallops  Abdomen:  Soft, undistended, non tender, no organomegaly, BS present  Extremities: No edema or cyanosis.  Distal pulses well felt  Neurological : non focal but more cooperative confused         Medications:  divalproex, 125 mg, QAM AC  divalproex, 250 mg, Nightly  QUEtiapine, 25 mg, Nightly  potassium bicarb-citric acid, 20 mEq, Daily  vitamin E, 400 Units, Daily  vitamin B-12, 500 mcg, Daily  therapeutic multivitamin-minerals, 1 tablet, Daily with breakfast  [Held by provider] losartan, 100 mg, Daily  [Held by provider] donepezil, 10 mg, Nightly  atorvastatin, 40 mg, Nightly  aspirin, 81 mg, Daily  amLODIPine, 2.5 mg, Nightly  sodium chloride flush, 5-40 mL, 2 times per day  enoxaparin, 40 mg, Daily  insulin lispro, 0-4 Units, Q4H  ampicillin-sulbactam, 3,000 mg, Q6H    PRN Medications:  perflutren lipid microspheres, 1.5 mL, ONCE PRN  albuterol, 2.5 mg, Q6H PRN  OLANZapine, 5 mg, Q8H PRN   Or  OLANZapine (ZyPREXA) in sterile water IntraMUSCular, 5 mg, Q8H PRN  benztropine mesylate, 2 mg, BID PRN  aluminum & magnesium hydroxide-simethicone, 30 mL, Q6H PRN  sodium chloride flush, 10 mL, PRN  sodium chloride, , PRN  potassium chloride, 40 mEq, PRN   Or  potassium alternative oral replacement, 40 mEq, PRN   Or  potassium chloride, 10 mEq, PRN  magnesium sulfate, 1,000 mg, PRN  ondansetron, 4 mg, Q8H PRN   Or  ondansetron, 4 mg, Q6H PRN  polyethylene glycol, 17 g, Daily PRN  acetaminophen, 650 mg, Q6H PRN   Or  acetaminophen, 650 mg, Q6H PRN  glucose, 4 tablet, PRN  dextrose bolus, 125 mL, PRN   Or  dextrose bolus, 250 mL, PRN  glucagon (rDNA), 1 mg, PRN  dextrose, , Continuous PRN  labetalol, 10 mg, Q6H PRN        Data:  CBC:   Recent Labs     02/28/23  0522   WBC 8.6   HGB 12.6*   HCT 36.2*   MCV 90.0   PLT 80*       BMP:   Recent Labs     02/28/23  0522 03/02/23  0439    142   K 3.0* 3.9    105   CO2 27 27   BUN 21* 20   CREATININE 1.0 1.0       LIVER PROFILE:   No results for input(s): AST, ALT, LIPASE, BILIDIR, BILITOT, ALKPHOS in the last 72 hours. Invalid input(s): AMYLASE,  ALB    PT/INR: No results for input(s): PROTIME, INR in the last 72 hours. CULTURES  COVID and Flu not detected   Sputum culture pending      RADIOLOGY  XR CHEST PORTABLE   Final Result   Bands of opacity in the lower lungs bilaterally, new when compared to the   previous exam.  These are probably atelectasis, but pneumonia or aspiration   remains a differential.           CT chest 2/26/23    1. Dense mucous plugging within both lower lobes, with partial consolidation   of both lower lobes, which could represent either aspiration or pneumonia. 2. Cholelithiasis.        Assessment/Plan:    #Acute encephalopathy - likely toxic with meds  -2/2 to medication given on adm, Given ativan and geodon in ED  -held depakote and other sedatives now with severe drowsiness  - improved drowsiness but remains intermittently agitated with baseline dementia issues  -had negative CT head in ED  - increased seroquel nightly to 25 mg and increased depakote to 250 mg nightly   - psychiatry following  - updated daughter  regarding plan of care and plans for rehab       #Acute hypoxic respiratory failure   - sec to atelectasis vs aspiration pneumonia   -requiring 5 L O2 while sleeping initially     -CXR was neg in ED   --CT chest now with dense mucus plugging, consolidations possible aspiration or pna   -respiratory team were able to suction significant amount of mucus out   -IV unasyn done D 7 - stop today   -procalc, LA  elevated  -weaned to RA , SLP eval done   -pulm consulted      #Alzheimers dementia with behavioral disturbances   - per psychiatry team  -  resumed depakote and seroquel as above     #Elevated Cr   -Cr 1.4   -improved with IVF      #Elevated troponin   0.13--> 0.11 >0.09  -EKG without acute ischemic changes   -on ASA, statin  -cardiology consulted and no plans for workup given severe agitation issues     #CAD   -s/p CABG   -on ASA, statin      #HTN   -on losartan and norvasc  resumed as needed       #DM type 2   -holding oral regimen   -monitor BG    -SSI        DVT Prophylaxis: Lovenox   Diet: ADULT DIET;  Dysphagia - Pureed; 5 carb choices (75 gm/meal)  Code Status: DNR-CC    Dc planning to rehab ok     Patrick Hopper MD,3/2/2023 7:38 AM

## 2023-03-02 NOTE — PROGRESS NOTES
Inpatient Occupational Therapy Treatment    Unit: PCU  Date:  3/2/2023  Patient Name:    Joya Pinto  Admitting diagnosis:  Acute respiratory failure with hypoxia [facility]) [J96.01]  Admit Date:  2/25/2023  Precautions/Restrictions/WB Status/ Lines/ Wounds/ Oxygen: Fall risk, Bed/chair alarm, Lines (IV and Purewick catheter), Confusion, Continuous pulse oximetry, and Telesitter    Treatment Time:  925-1000  Treatment Number:  2  Timed Code Treatment Minutes: 35 minutes  Total Treatment Minutes:  35 minutes    Patient Goals for Therapy: unable to state        Discharge Recommendations: SNF-likely transition to LTC  DME needs for discharge: Defer to facility       Therapy recommendations for staff:   Assist of 2 for gait belt and handhold assist or RW for transfers and functional mobility in room    History of Present Illness: per CHENG VASQUES H&P 2/25/23:  \"The patient is a [de-identified] y.o. male with pmhx of alzheimers dementia, CAD, DM who presented to [facility] ED with concerning for worsening altered mental status, patient apparently had some odd behavior last night, told family that he was going to kill them, they ultimately brought him in for placement, inability to care for him but he was communicating. He was medically cleared in ED and sent to inpatient madhuri psych. When I assessed patient he was unable to contribute to history, sleeping and snoring very loudly, hypoxic on RA 85%, sounded extremely congested, could only get him to respond and withdrawal from pain. Did not open his eyes or communicate. He does move all 4 extremities in response to pain. He will be transferred to medical floor. \"    Home Health S4 Level Recommendation:  NA    AM-PAC Score: AM-PAC Inpatient Daily Activity Raw Score: 12     Subjective:  Patient sitting up in chair with no family present. Pt agreeable to this OT session.      Cognition:    A&O Person - limited by aphasia  Able to follow 1 step commands, 50% of the time    Pain: No  Location:   Rating: NA /10  Pain Medicine Status: No request made    Preadmission Environment:   Pt. Lives     Spouse and has required more assistance than spouse can provide; planning to discharge to facility  Home environment:    two story home-full bath on both levels, bedroom upstairs  Steps to enter first floor:   No steps  Steps to second floor/basement: Full flight of 12-13 and hand rail: unilateral  Laundry:     unknown  Bathroom:     walk in shower, shower chair , and standard height toilet  Pt sleeps in a:    Flat bed  Equipment owned:    none    Preadmission Status:  Pt. Able to drive:    No, wife drives  Pt. Fully independent with ADLs:  No, wife assists  Pt. Required assistance for:   Bathing, Dressing, Cleaning, Cooking, Laundry , and grocery shopping  Pt. independent for functional transfers and utilized No Device for mobility in home and No Device out in community-sometimes more unsteady and family assists  History of falls:    No  Home Health Services:  None    Objective:    Balance:  Sitting:    SBA  Standing:    Min A  and With RW and gait belt      Bed Mobility:   Supine to Sit:    Not Tested  Sit to Supine:   Not Tested  Rolling:   Not Tested  Scooting in sitting: Not Tested  Scooting in supine:  Not Tested    Transfers:    Sit to stand:    Min A  and With RW and gait belt  Stand to sit:    Min A  and With RW and gait belt  Bed to chair:     Min A  and With RW and gait belt  Bed/ chair to standard toilet:  Not Tested  Bed/chair to [facility]:   Not Tested  Functional Mobility:   Min A  and With RW and gait belt    See PT note for gait analysis. ADLs:  Dressing:      UE:   Not Tested  LE:    Not Tested    Bathing:    UE:  Not Tested  LE:  Not Tested    Eating:   Not Tested    Toileting:  Not Tested-purewick in place    Grooming/hygiene: Min A brush teeth, clean dentures at sink in stance (MIN A for stance in RW); MIN A wash face seated (limited engagement with task, \"Am I dirty? \")    Activity Tolerance:   Pt completed therapy session with no adverse symptoms    Positioning Needs:   Pt reclined in chair, alarm set, call light provided and all needs within reach . Ther Ex / Activities Initiated:   N/A    Patient/Family Education:   Pt educated on role of inpatient OT, plan of care, importance of continued activity, DC recommendations, safety awareness, and transfer techniques. Assessment:    Pt seen for Occupational therapy treatment in acute care setting. Pt limited by cognition, making progress toward goals. Recommending SNF upon discharge as patient functioning well below baseline, demonstrates good rehab potential and unable to return home due to limited or no family support, inability to negotiate stairs to enter home/bedroom/bathroom, burden of care beyond caregiver ability, and limited safety awareness. Goal(s) : To be met in 3 Visits:  Bed to toilet/BSC: Min A  Pt will complete 3/3 CHF goals     N/A    To be met in 5 Visits:  Supine to/from Sit in preparation for ADL task:   Min A  Toileting        Mod A  Grooming       Mod A  Upper Body Dressing: Mod A  Lower Body Dressing: Mod A  Pt to demonstrate UE therapeutic exs x 15 reps with minimal cues    Rehabilitation Potential: Good  Strengths for achieving goals include: Family Support   Barriers to achieving goals include:  Complexity of condition    Plan: To be seen 3-5 x/wk while in acute care setting for therapeutic exercises, bed mobility, transfers, family/patient education, ADL/IADL retraining, and energy conservation training.     Electronically signed by David Hadley OT on 3/2/2023 at 11:52 AM      If patient discharges from this facility prior to next visit, this note will serve as the Discharge Summary

## 2023-03-02 NOTE — DISCHARGE INSTR - COC
Continuity of Care Form    Patient Name: Pat Navarro   :  1940  MRN:  [id number]    Admit date:  2023  Discharge date:  3/3/2023        Code Status Order: DNR-CC   Advance Directives:     Admitting Physician:  Gayla German MD  PCP: Aisha Tapia    Discharging Nurse: Radha Milligan, [address] Unit/Room#: /5621-32  Discharging Unit Phone Number: [phone]      Emergency Contact:   Extended Emergency Contact Information  Primary Emergency Contact: Yosef herrera  Mobile Phone: [phone]  Relation: Child  Secondary Emergency Contact: Janina Flood  Mobile Phone: [phone]  Relation: Brother/Sister    Past Surgical History:  No past surgical history on file. Immunization History: There is no immunization history on file for this patient.     Active Problems:  Patient Active Problem List   Diagnosis Code    Dementia with behavioral disturbance F03.918    Acute respiratory failure with hypoxia ([facility]) J96.01    Aspiration into airway T17.908A    Alzheimer's dementia ([facility]) G30.9, F02.80    Primary hypertension I10    Type 2 diabetes mellitus without complication, without long-term current use of insulin ([facility]) E11.9    Altered mental status R41.82    Aspiration pneumonia of both lower lobes ([facility]) J69.0       Isolation/Infection:   Isolation            No Isolation          Patient Infection Status       Infection Onset Added Last Indicated Last Indicated By Review Planned Expiration Resolved Resolved By    None active    Resolved    COVID-19 (Rule Out) 23 COVID-19 & Influenza Combo (Ordered)   23 Rule-Out Test Resulted            Nurse Assessment:  Last Vital Signs: /70   Pulse 55   Temp 97.1 °F (36.2 °C) (Axillary)   Resp 19   Ht 5' 4\" (1.626 m)   Wt 131 lb (59.4 kg)   SpO2 97%   BMI 22.49 kg/m²     Last documented pain score (0-10 scale): Pain Level: 0  Last Weight:   Wt Readings from Last 1 Encounters:   23 131 lb (59.4 kg)     Mental Status:  alert    IV Access:  - None    Nursing Mobility/ADLs:  Walking   Assisted  Transfer  Assisted  Bathing  Assisted  Dressing  Assisted  Toileting  Assisted  Feeding  Independent; needs set up  Med Admin  Assisted  Med Delivery   crushed    Wound Care Documentation and Therapy:        Elimination:  Continence: Bowel: No  Bladder: No  Urinary Catheter: None   Colostomy/Ileostomy/Ileal Conduit: No       Date of Last BM: 3/1/2023    Intake/Output Summary (Last 24 hours) at 3/2/2023 1128  Last data filed at 3/2/2023 0832  Gross per 24 hour   Intake 300 ml   Output 701 ml   Net -401 ml     I/O last 3 completed shifts: In: 180 [P.O.:180]  Out: 2602 [Urine:2600; Stool:2]    Safety Concerns: At Risk for Falls and Aspiration Risk    Impairments/Disabilities:      None    Nutrition Therapy:  Current Nutrition Therapy:   - Oral Diet:  Dysphagia - Pureed    Routes of Feeding: Oral  Liquids: Thin Liquids  Daily Fluid Restriction: no  Last Modified Barium Swallow with Video (Video Swallowing Test): not done    Treatments at the Time of Hospital Discharge:   Respiratory Treatments:   Oxygen Therapy:  is not on home oxygen therapy.   Ventilator:    - No ventilator support    Rehab Therapies: Physical Therapy and Occupational Therapy  Weight Bearing Status/Restrictions: No weight bearing restrictions  Other Medical Equipment (for information only, NOT a DME order):  walker  Other Treatments:     Patient's personal belongings (please select all that are sent with patient):  None    RN SIGNATURE:Electronically signed by Francoise Monique RN on 3/3/2023 at 9:52 AM      CASE MANAGEMENT/SOCIAL WORK SECTION    Inpatient Status Date: IP 02/25/2023    Readmission Risk Assessment Score:  Readmission Risk              Risk of Unplanned Readmission:  21           Discharging to Facility/ Agency   Name: [facility]  Address: [address] [address]  Phone: [phone]  Fax: [phone]    Dialysis Facility (if applicable)   Name:  Address:  Dialysis Schedule:  Phone:  Fax:    / signature: Electronically signed by Annalee Josue RN on 3/3/23 at 9:46 AM EST    PHYSICIAN SECTION    Prognosis: Good    Condition at Discharge: Stable    Rehab Potential (if transferring to Rehab): Fair    Recommended Labs or Other Treatments After Discharge:  avoid excessive sedatives., aspiration precautions      Physician Certification: I certify the above information and transfer of Eliceo Lilly  is necessary for the continuing treatment of the diagnosis listed and that he requires Skilled Nursing Facility for less 30 days.     Update Admission H&P: No change in H&P    PHYSICIAN SIGNATURE:  Electronically signed by Donis Solares MD on 3/2/23 at 11:28 AM EST

## 2023-03-02 NOTE — PROGRESS NOTES
Bedside report and transfer of care given to [facility]. Pt currently resting in bed with the call light within reach. Pt denies any other care needs at this time. Pt stable at this time.

## 2023-03-02 NOTE — FLOWSHEET NOTE
03/01/23 2100   Vital Signs   Temp 97 °F (36.1 °C)   Temp Source Axillary   Heart Rate 89   Heart Rate Source Monitor   Resp 18   BP (!) 143/88   MAP (Calculated) 106   BP Location Right upper arm   BP Method Automatic   Patient Position Semi fowlers   Level of Consciousness 0   MEWS Score 1   Oxygen Therapy   SpO2 96 %   O2 Device None (Room air)   Pt was very agitated and attempting to get put of bed. Pt had episode of incontinence. Several nurses worked to get patient back in bed and changed. Pt made comfortable and is now resting in bed and is currently calm. Vitals obtained. Disoriented times 4. Shift assessment completed, see flow sheet. Scheduled meds given, see MAR. Tele camera in place.

## 2023-03-02 NOTE — FLOWSHEET NOTE
03/01/23 9412   Pain Assessment   Pain Assessment Grace-Portillo FACES   Pain Level 2   Grace-Baker Pain Rating 2   PRN Tylenol and Zofran given.  Milton Samuel RN

## 2023-03-02 NOTE — PROGRESS NOTES
Inpatient Physical Therapy Evaluation    Unit: PCU  Date:  3/2/2023  Patient Name:    Terri Gordon  Admitting diagnosis:  Acute respiratory failure with hypoxia [facility]) [J96.01]  Admit Date:  2/25/2023  Precautions/Restrictions/WB Status/ Lines/ Wounds/ Oxygen: Fall risk, Bed/chair alarm, Lines (IV and Purewick catheter), Confusion, and Telesitter, restraints recently D/C'd, per RN pt okay to be up in chair with chair alarm on and telesitter present in room. Treatment Time:  9:30 - 9:57  Treatment Number:  2  Timed Code Treatment Minutes: 27 minutes  Total Treatment Minutes:  27 minutes    Patient Stated Goals for Therapy:  none stated      Discharge Recommendations: SNF (likely transition to LTC)  DME needs for discharge: Defer to facility       Therapy recommendation for EMS Transport: requires transport by cot due to pt needs lift equipment for safe transfers and pt needs A x 2 for safe transfers    Therapy recommendations for staff:   Assist of 2 for transfers with use of JOSE E STEDY and gait belt to/from chair    History of Present Illness:   Per CHENG VASQUES H&P 2/25/23:  \"The patient is a [de-identified] y.o. male with pmhx of alzheimers dementia, CAD, DM who presented to [facility] ED with concerning for worsening altered mental status, patient apparently had some odd behavior last night, told family that he was going to kill them, they ultimately brought him in for placement, inability to care for him but he was communicating. He was medically cleared in ED and sent to inpatient madhuri psych. When I assessed patient he was unable to contribute to history, sleeping and snoring very loudly, hypoxic on RA 85%, sounded extremely congested, could only get him to respond and withdrawal from pain. Did not open his eyes or communicate. He does move all 4 extremities in response to pain. He will be transferred to medical floor. \"    Home Health S4 Level Recommendation:  NA    AM-PAC Mobility Score       AM-PAC Inpatient Mobility without Stair Climbing Raw Score : 15    Subjective  Patient lying reclined bed with family present (dtr). Pt agreeable to this PT session. Cognition    A&O Person   Able to follow 1 step commands, 50% of the time  Simple slow commands. Per dtr, pt has aphasia    Pain   No  Location:   Rating: NA /10  Pain Medicine Status: Denies need    Preadmission Environment:   Pt. Lives                                              Spouse and has required more assistance than spouse can provide; planning to discharge to facility  Home environment:                            two story home-full bath on both levels, bedroom upstairs  Steps to enter first floor:                     No steps  Steps to second floor/basement:        Full flight of 12-13 and hand rail: unilateral  Laundry:                                              unknown  Bathroom:                                           walk in shower, shower chair , and standard height toilet  Pt sleeps in a:                                     Flat bed  Equipment owned:                              none     Preadmission Status:  Pt. Able to drive:                                 No, wife drives  Pt. Fully independent with ADLs:       No, wife assists  Pt. Required assistance for:               Bathing, Dressing, Cleaning, Cooking, Laundry , and grocery shopping  Pt. independent for functional transfers and utilized No Device for mobility in home and No Device out in community-sometimes more unsteady and family assists  History of falls:                                                No  Home Health Services:                       None      Objective    Balance  Static Sitting:  Good ; SBA  Dynamic Sitting:  Good - ; CGA  Comments: in chair    Static Standing: Fair +; CGA  Dynamic Standing: Fair ; Min A   Comments: RW    Posture  Seated: Forward head and neck  Standing:  Forward head and neck    Bed Mobility   Supine to Sit:    Not Tested  Sit to Supine:   Not Tested  Rolling:   Not Tested  Scooting in sitting: CGA  Scooting in supine:  Not Tested  Bridging:  Not Tested    Transfer Training     Sit to stand:   Min A   Stand to sit:   Min A   Bed to Chair:   Not Tested with use of N/A    Gait gait completed as indicated below  Distance:      60 ft   Deviations (firm surface/linoleum):  decreased janet, increased CARINA, step through pattern, shuffles, decreased foot clearance bilaterally, and decreased step length bilaterally  Assistive Device Used:    gait belt and rolling walker (RW)  Level of Assist:    Min A   Comment: unsteady, poor use of RW, LOB x 2 occurrences when turning (amb 30 ft to BR where pt stood sinkside ~ 8 min and then amb 30 ft back to chair with no seated rest break between)    Stair Training deferred, pt unsafe/ not appropriate to complete stairs at this time  # of Steps:   N/A  Level of Assist:  Not Tested  UE Support:  NA  Assistive Device:  N/A  Pattern:   N/A  Comments:      Therapeutic Exercises Initiated   Attempted to initiate supine therex in recliner, pt unable to participate 2/2 impaired command following  Supine:  N/A    Seated:  N/A    Standing:  N/A    Activity Tolerance   During therapy session noted pt with no adverse symptoms to activity     BP (mmHg) HR (bpm) SpO2 (%) on RA  Comments   Supine at rest       Seated at EOB       Standing       End of session         Positioning Needs   Pt reclined in chair, alarm set, positioned in proper neutral alignment and pressure relief provided. Call light provided and all needs within reach  Telesitter present in room  RN aware of pt status. Other Activities  None. Patient/Family Education   Pt educated on role of inpatient PT, POC, importance of continued activity, transfer techniques and calling for assist with mobility. Assessment  Pt seen today for physical therapy Treatment.  Pt demonstrated improved ability to participate in therapy tx this date as he was cooperative and following 1 step commands better than previous tx. Pt with impaired balance and poor safety awareness, which required constant assist from PT. Recommending SNF upon discharge as patient functioning well below baseline, demonstrates good rehab potential and unable to return home due to limited or no family support, inability to negotiate stairs to enter home/bedroom/bathroom, burden of care beyond caregiver ability, home environment not conducive to patient recovery, and limited safety awareness. Goals : To be met in 3 visits:  1). Independent with LE Ex x 10 reps    To be met in 6 visits:  1). Supine to/from sit: Min A   2). Sit to/from stand: Min A   3). Bed to chair: Min A   4). Gait: Ambulate 50 ft.  with  Min A  and use of LRAD (least restrictive assistive device)  5). Tolerate B LE exercises 3 sets of 10-15 reps    Rehabilitation Potential: Fair  Strengths for achieving goals include:   PLOF and Family Support   Barriers to achieving goals include:    Impaired cognition    Plan    To be seen 3-5 x / week  while in acute care setting for therapeutic exercises, bed mobility, transfers, progressive gait training, balance training, and family/patient education. Signature: Neda Bennett, PT, DPT, OMT-C  #[id number]      If patient discharges from this facility prior to next visit, this note will serve as the Discharge Summary.

## 2023-03-02 NOTE — CARE COORDINATION
INTERDISCIPLINARY PLAN OF CARE CONFERENCE    Date/Time: 3/2/2023 4:09 PM  Completed by: Rhoda Braga RN, Case Management      Patient Name:  Beka Argueta  YOB: 1940  Admitting Diagnosis: Acute respiratory failure with hypoxia [facility]) [J96.01]     Admit Date/Time:  2/25/2023  4:43 PM    Chart reviewed. Interdisciplinary team contacted or reviewed plan related to patient progress and discharge plans. Disciplines included Case Management, Nursing, and Dietitian. Current Status: IP 02/25/2023  PT/OT recommendation for discharge plan of care: [facility] 15     Discharge Recommendations: SNF (likely transition to LTC)  DME needs for discharge: Defer to facility     Expected D/C Disposition:  Skilled nursing facility  Confirmed plan with patient's daughter, Anca July- ok to forward referral to [facility] sister facility [facility]  Discharge Plan Comments: Pt w/ advanced dementia. Now out of restraints. Behaviors controlled. Accepted for admission to [facility] secure unit. Can transfer pt to [facility] on 03/03. +[facility] Private room available now. Will plan on arranging transport for mid-morning tomorrow.      Home O2 in place on admit: No  Pt informed of need to bring portable home O2 tank on day of discharge for nursing to connect prior to leaving:  No  Verbalized agreement/Understanding:  No

## 2023-03-02 NOTE — PROGRESS NOTES
Vitals stable. Patient is sleeping but awaken with prompting. Patient remain confused but cooperative.

## 2023-03-02 NOTE — FLOWSHEET NOTE
03/02/23 1430   Vital Signs   Temp 97.6 °F (36.4 °C)   Temp Source Oral   Heart Rate 61   Heart Rate Source Monitor   Resp 15   /70   MAP (Calculated) 82   BP Location Right upper arm   Patient Position Sitting   Level of Consciousness 0   MEWS Score 1   Pain Assessment   Pain Assessment Grace-Portillo FACES   Pain Level 0   Oxygen Therapy   SpO2 95 %   O2 Device None (Room air)   Skin Assessment Clean, dry, & intact   O2 Flow Rate (L/min) 0 L/min     Afternoon vitals completed. Pt stable.     Marilee Le RN

## 2023-03-02 NOTE — FLOWSHEET NOTE
03/02/23 0200   Vital Signs   Temp 96.8 °F (36 °C)   Temp Source Axillary   Heart Rate 70   Heart Rate Source Monitor   Resp 20   /77   MAP (Calculated) 94   BP Location Right upper arm   BP Method Automatic   Patient Position Semi fowlers   Level of Consciousness 0   MEWS Score 1   Pain Assessment   Pain Assessment Grace-Portillo FACES   Pain Level 0   Oxygen Therapy   SpO2 98 %   O2 Device None (Room air)   Height and Weight   Weight 131 lb (59.4 kg)   Weight Method Actual;Bed scale   BMI (Calculated) 22.5   Vitals obtained, see above. IV in R FA was leaking and removed by this nurse. Unable to place IV, called clinical and awaiting placement of ultrasound IV. Pt has no IV medications or fluids due at this time.  Vincenzo Hall RN

## 2023-03-02 NOTE — PROGRESS NOTES
Daughter, Ghulam Chavarria updated on care plan. Addressed all nursing and family concerns. Pt stable.     Hua Jones RN

## 2023-03-02 NOTE — PLAN OF CARE
Problem: Confusion  Goal: Confusion, delirium, dementia, or psychosis is improved or at baseline  Description: INTERVENTIONS:  1. Assess for possible contributors to thought disturbance, including medications, impaired vision or hearing, underlying metabolic abnormalities, dehydration, psychiatric diagnoses, and notify attending LIP  2. Tram high risk fall precautions, as indicated  3. Provide frequent short contacts to provide reality reorientation, refocusing and direction  4. Decrease environmental stimuli, including noise as appropriate  5. Monitor and intervene to maintain adequate nutrition, hydration, elimination, sleep and activity  6. If unable to ensure safety without constant attention obtain sitter and review sitter guidelines with assigned personnel  7. Initiate Psychosocial CNS and Spiritual Care consult, as indicated  Outcome: Not Progressing     Problem: Skin/Tissue Integrity  Goal: Absence of new skin breakdown  Description: 1. Monitor for areas of redness and/or skin breakdown  2. Assess vascular access sites hourly  3. Every 4-6 hours minimum:  Change oxygen saturation probe site  4. Every 4-6 hours:  If on nasal continuous positive airway pressure, respiratory therapy assess nares and determine need for appliance change or resting period. Outcome: Progressing     Problem: Safety - Adult  Goal: Free from fall injury  Outcome: Progressing     Problem: Confusion  Goal: Confusion, delirium, dementia, or psychosis is improved or at baseline  Description: INTERVENTIONS:  1. Assess for possible contributors to thought disturbance, including medications, impaired vision or hearing, underlying metabolic abnormalities, dehydration, psychiatric diagnoses, and notify attending LIP  2. Tram high risk fall precautions, as indicated  3. Provide frequent short contacts to provide reality reorientation, refocusing and direction  4.  Decrease environmental stimuli, including noise as appropriate  5. Monitor and intervene to maintain adequate nutrition, hydration, elimination, sleep and activity  6. If unable to ensure safety without constant attention obtain sitter and review sitter guidelines with assigned personnel  7.  Initiate Psychosocial CNS and Spiritual Care consult, as indicated  Outcome: Not Progressing

## 2023-03-03 VITALS
HEART RATE: 58 BPM | WEIGHT: 131 LBS | SYSTOLIC BLOOD PRESSURE: 124 MMHG | DIASTOLIC BLOOD PRESSURE: 70 MMHG | TEMPERATURE: 97 F | OXYGEN SATURATION: 96 % | BODY MASS INDEX: 22.36 KG/M2 | HEIGHT: 64 IN | RESPIRATION RATE: 16 BRPM

## 2023-03-03 LAB
ANION GAP SERPL CALCULATED.3IONS-SCNC: 10 MMOL/L (ref 3–16)
BUN BLDV-MCNC: 27 MG/DL (ref 7–20)
CALCIUM SERPL-MCNC: 9 MG/DL (ref 8.3–10.6)
CHLORIDE BLD-SCNC: 104 MMOL/L (ref 99–110)
CO2: 27 MMOL/L (ref 21–32)
CREAT SERPL-MCNC: 1.3 MG/DL (ref 0.8–1.3)
GFR SERPL CREATININE-BSD FRML MDRD: 55 ML/MIN/{1.73_M2}
GLUCOSE BLD-MCNC: 100 MG/DL (ref 70–99)
GLUCOSE BLD-MCNC: 131 MG/DL (ref 70–99)
GLUCOSE BLD-MCNC: 244 MG/DL (ref 70–99)
PERFORMED ON: ABNORMAL
PERFORMED ON: ABNORMAL
POTASSIUM SERPL-SCNC: 4.5 MMOL/L (ref 3.5–5.1)
SODIUM BLD-SCNC: 141 MMOL/L (ref 136–145)

## 2023-03-03 PROCEDURE — 6370000000 HC RX 637 (ALT 250 FOR IP)

## 2023-03-03 PROCEDURE — 99239 HOSP IP/OBS DSCHRG MGMT >30: CPT | Performed by: INTERNAL MEDICINE

## 2023-03-03 PROCEDURE — 6360000002 HC RX W HCPCS

## 2023-03-03 PROCEDURE — 6370000000 HC RX 637 (ALT 250 FOR IP): Performed by: INTERNAL MEDICINE

## 2023-03-03 PROCEDURE — 80048 BASIC METABOLIC PNL TOTAL CA: CPT

## 2023-03-03 PROCEDURE — 2580000003 HC RX 258

## 2023-03-03 PROCEDURE — 36415 COLL VENOUS BLD VENIPUNCTURE: CPT

## 2023-03-03 RX ADMIN — CYANOCOBALAMIN TAB 500 MCG 500 MCG: 500 TAB at 08:41

## 2023-03-03 RX ADMIN — Medication 400 UNITS: at 08:42

## 2023-03-03 RX ADMIN — POTASSIUM BICARBONATE 20 MEQ: 782 TABLET, EFFERVESCENT ORAL at 08:40

## 2023-03-03 RX ADMIN — SODIUM CHLORIDE, PRESERVATIVE FREE 10 ML: 5 INJECTION INTRAVENOUS at 08:42

## 2023-03-03 RX ADMIN — AMPICILLIN SODIUM AND SULBACTAM SODIUM 3000 MG: 2; 1 INJECTION, POWDER, FOR SOLUTION INTRAMUSCULAR; INTRAVENOUS at 05:14

## 2023-03-03 RX ADMIN — ASPIRIN 81 MG: 81 TABLET, COATED ORAL at 08:41

## 2023-03-03 RX ADMIN — ENOXAPARIN SODIUM 40 MG: 100 INJECTION SUBCUTANEOUS at 08:43

## 2023-03-03 RX ADMIN — MULTIPLE VITAMINS W/ MINERALS TAB 1 TABLET: TAB at 08:41

## 2023-03-03 RX ADMIN — DIVALPROEX SODIUM 125 MG: 125 CAPSULE, COATED PELLETS ORAL at 05:08

## 2023-03-03 NOTE — PLAN OF CARE
Problem: Discharge Planning  Goal: Discharge to home or other facility with appropriate resources  Outcome: Completed     Problem: Pain  Goal: Verbalizes/displays adequate comfort level or baseline comfort level  Outcome: Completed     Problem: Chronic Conditions and Co-morbidities  Goal: Patient's chronic conditions and co-morbidity symptoms are monitored and maintained or improved  Outcome: Completed     Problem: Skin/Tissue Integrity  Goal: Absence of new skin breakdown  Description: 1. Monitor for areas of redness and/or skin breakdown  2. Assess vascular access sites hourly  3. Every 4-6 hours minimum:  Change oxygen saturation probe site  4. Every 4-6 hours:  If on nasal continuous positive airway pressure, respiratory therapy assess nares and determine need for appliance change or resting period. Outcome: Completed     Problem: Safety - Adult  Goal: Free from fall injury  Outcome: Completed     Problem: Safety - Medical Restraint  Goal: Remains free of injury from restraints (Restraint for Interference with Medical Device)  Description: INTERVENTIONS:  1. Determine that other, less restrictive measures have been tried or would not be effective before applying the restraint  2. Evaluate the patient's condition at the time of restraint application  3. Inform patient/family regarding the reason for restraint  4. Q2H: Monitor safety, psychosocial status, comfort, nutrition and hydration  Outcome: Completed     Problem: Confusion  Goal: Confusion, delirium, dementia, or psychosis is improved or at baseline  Description: INTERVENTIONS:  1. Assess for possible contributors to thought disturbance, including medications, impaired vision or hearing, underlying metabolic abnormalities, dehydration, psychiatric diagnoses, and notify attending LIP  2. [place] high risk fall precautions, as indicated  3. Provide frequent short contacts to provide reality reorientation, refocusing and direction  4.  Decrease environmental stimuli, including noise as appropriate  5. Monitor and intervene to maintain adequate nutrition, hydration, elimination, sleep and activity  6. If unable to ensure safety without constant attention obtain sitter and review sitter guidelines with assigned personnel  7.  Initiate Psychosocial CNS and Spiritual Care consult, as indicated  Outcome: Completed

## 2023-03-03 NOTE — FLOWSHEET NOTE
03/03/23 0739   Vital Signs   Temp 97 °F (36.1 °C)   Temp Source Axillary   Heart Rate 58   Heart Rate Source Monitor   Resp 16   /70   MAP (Calculated) 88   BP Location Right upper arm   Level of Consciousness 0   MEWS Score 1   Oxygen Therapy   SpO2 96 %   O2 Device None (Room air)   Pt. Resting in bed. Call light in reach. Shift assessment completed see flow sheet. Denies any needs at this time. Will continue to monitor.

## 2023-03-03 NOTE — CARE COORDINATION
DISCHARGE ORDER  Date/Time 3/3/2023 9:53 AM  Completed by: Jennifer Watson RN, Case Management    Patient Name: Ross Eckert    : 1940      Admit order Date and Status: 2023  Noted discharge order. (verify MD's last order for status of admission/Traditional Medicare 3 [place] Inpatient qualifying stay required for SNF)    Confirmed discharge plan with:              Patient:  advanced Dementia, Call placed to daughter (caregiver), Indy Peers                                  Discharge to Facility:   [address], [address] [place] [address]  Phone: [phone] RN report  Fax: [phone]   Facility phone number for staff giving report:  see above  Pre-certification completed: [address] Notification (BEATRIZ) completed: BEATRIZ   Discharge orders and Continuity of Care faxed to facility:  faxed      Transportation:               Medical Transport explained with choice list offered to pt/family. Choice: (no preference)  Agency used: [address] [place] [address] Rd up time:  12:00 PM      Pt/family/Nursing/Facility aware of  time:  Ambulance form completed: YES     Date Last IMM Given: 2023    Comments:    Pt is being d/c'd to  today. Pt's O2 sats are 96% on RA. Discharge timeout done with Nasra Self . All discharge needs and concerns addressed. Discharging nurse to complete HUAN, reconcile AVS, and place final copy with patient's discharge packet. Discharging RN to ensure that written prescriptions for  Level II medications are sent with patient to the facility as per protocol.

## 2023-03-03 NOTE — PROGRESS NOTES
Bedside report and transfer of care given to Nadia Pineda [facility]. Pt currently resting in bed with the call light within reach. Pt denies any other care needs at this time. Pt stable at this time.       Edvin Lopez RN

## 2023-03-03 NOTE — PROGRESS NOTES
Patient remains awake but calm in bed. Call light is with in reach. Patient is directable and cooperative. Will continue to monitor.  Carolina Robb RN

## 2023-03-03 NOTE — DISCHARGE SUMMARY
Name:  Karthik Saunders  Room:  /9111-20  MRN:    [id number]    Discharge Summary      This discharge summary is in conjunction with a complete physical exam done on the day of discharge. Discharging Physician: Dr. Karthik Cunha: 2/25/2023  Discharge:  03/03/23     HPI taken from admission H&P:      The patient is a [de-identified] y.o. male with pmhx of alzheimers dementia, CAD, DM who presented to [facility] ED with concerning for worsening altered mental status, patient apparently had some odd behavior last night, told family that he was going to kill them, they ultimately brought him in for placement, inability to care for him but he was communicating. He was medically cleared in ED and sent to inpatient [facility]. When I assessed patient he was unable to contribute to history, sleeping and snoring very loudly, hypoxic on RA 85%, sounded extremely congested, could only get him to respond and withdrawal from pain. Did not open his eyes or communicate. He does move all 4 extremities in response to pain. He will be transferred to medical floor.         Diagnoses this Admission and Hospital Course     #Acute encephalopathy - with chronic dementia  likely toxic with meds  -2/2 to medication given on adm, Given ativan and geodon in ED  -held depakote and other sedatives now with severe drowsiness  - improved drowsiness but remains intermittently agitated with baseline dementia issues  -had negative CT head in ED  - increased seroquel nightly to 25 mg nightly and increased depakote to 250 mg nightly and doing well , able to come off restraints and cooperative x 48 hrs   - psychiatry consulted      #Acute hypoxic respiratory failure --resolved  - sec to atelectasis vs aspiration pneumonia   -requiring 5 L O2 while sleeping initially   -CXR was neg in ED   --CT chest now with dense mucus plugging, consolidations possible aspiration or pna   -procalc, LA  elevated  -respiratory team were able to suction significant amount of mucus out   -IV unasyn done D 7 - stop today   - improved  -weaned to RA , SLP eval done   -pulm consulted      #Alzheimers dementia with behavioral disturbances   - per psychiatry team  -  resumed depakote and seroquel as above      #Elevated Cr --resolved   -Cr 1.4   -improved with IVF      #Elevated troponin   -0.13--> 0.11 >0.09  -EKG without acute ischemic changes   -on ASA, statin  -cardiology consulted and no plans for workup given severe agitation issues     #CAD   -s/p CABG   -on ASA, statin      #HTN   -on losartan and norvasc  resumed as needed        #DM type 2   -holding oral regimen   -monitor BG    -SSI     Procedures (Please Review Full Report for Details)  N/A    Consults    Psychiatry   Cardiology  Pulmonary       Physical Exam at Discharge:    /70   Pulse 58   Temp 97 °F (36.1 °C) (Axillary)   Resp 16   Ht 5' 4\" (1.626 m)   Wt 131 lb (59.4 kg)   SpO2 96%   BMI 22.49 kg/m²      General:  elderly male, confused and awake, alert   Mucous Membranes:  Pink , anicteric  Neck: No JVD, no carotid bruit, no thyromegaly  Chest:  Clear to auscultation bilaterally, diminished in bases  Cardiovascular:  RRR S1S2 heard, no murmurs or gallops  Abdomen:  Soft, undistended, non tender, no organomegaly, BS present  Extremities: No edema or cyanosis. Distal pulses well felt  Neurological : non focal but more cooperative confused      CBC:   No results for input(s): WBC, HGB, HCT, MCV, PLT in the last 72 hours.     BMP:   Recent Labs     03/02/23  0439 03/03/23  0452    141   K 3.9 4.5    104   CO2 27 27   BUN 20 27*   CREATININE 1.0 1.3     CULTURES  COVID/Influenza: not detected    Sputum Culture: ordered, not collected       RADIOLOGY  XR CHEST PORTABLE   Final Result   Bands of opacity in the lower lungs bilaterally, new when compared to the   previous exam.  These are probably atelectasis, but pneumonia or aspiration   remains a differential.               Discharge Medications     Medication List        START taking these medications      * divalproex 250 MG extended release tablet  Commonly known as: DEPAKOTE ER  Take 1 tablet by mouth at bedtime     * divalproex 125 MG DR capsule  Commonly known as: DEPAKOTE SPRINKLE  Take 1 capsule by mouth every morning (before breakfast)  Replaces: divalproex 500 MG extended release tablet     QUEtiapine 25 MG tablet  Commonly known as: SEROQUEL  Take 1 tablet by mouth nightly           * This list has 2 medication(s) that are the same as other medications prescribed for you. Read the directions carefully, and ask your doctor or other care provider to review them with you. CHANGE how you take these medications      amLODIPine 2.5 MG tablet  Commonly known as: NORVASC  Take 1 tablet by mouth at bedtime  What changed: See the new instructions. losartan 100 MG tablet  Commonly known as: COZAAR  Take 0.5 tablets by mouth daily  What changed: See the new instructions. CONTINUE taking these medications      atorvastatin 40 MG tablet  Commonly known as: LIPITOR     glimepiride 1 MG tablet  Commonly known as: AMARYL            STOP taking these medications      divalproex 500 MG extended release tablet  Commonly known as: DEPAKOTE ER  Replaced by: divalproex 125 MG DR capsule     donepezil 10 MG tablet  Commonly known as: ARICEPT               Where to Get Your Medications        Information about where to get these medications is not yet available    Ask your nurse or doctor about these medications  amLODIPine 2.5 MG tablet  divalproex 125 MG DR capsule  divalproex 250 MG extended release tablet  losartan 100 MG tablet  QUEtiapine 25 MG tablet           Discharged in stable condition to [facility]    Follow Up:   Follow up with physician at [facility], MD, 3/3/2023 8:32 AM

## 2023-03-03 NOTE — PROGRESS NOTES
Pt. Report called to Angelito Cook at the [facility]. Pt. In stable condition awaiting transport.

## 2023-03-03 NOTE — FLOWSHEET NOTE
03/02/23 2000   Vital Signs   Temp 97.9 °F (36.6 °C)   Temp Source Axillary   Heart Rate 57   Heart Rate Source Monitor   Resp 15   /63   MAP (Calculated) 84   BP Location Right upper arm   Patient Position Semi fowlers   Level of Consciousness 0   MEWS Score 1   Pain Assessment   Pain Assessment Grace-[place] FACES   Pain Level 0   Oxygen Therapy   SpO2 96 %   O2 Device None (Room air)   O2 Flow Rate (L/min) 0 L/min

## 2023-03-03 NOTE — PROGRESS NOTES
Patient is in bed, alert and oriented to person only. call light is within reach. All night medications given. Shift assessment and vitals are complete. Patient has no complaints at this time. Will continue to monitor.    Colletta Auerbach, RN

## 2023-03-06 ENCOUNTER — APPOINTMENT (OUTPATIENT)
Dept: GENERAL RADIOLOGY | Age: 83
DRG: 871 | End: 2023-03-06
Payer: MEDICARE

## 2023-03-06 ENCOUNTER — HOSPITAL ENCOUNTER (INPATIENT)
Age: 83
LOS: 2 days | Discharge: HOSPICE | DRG: 871 | End: 2023-03-08
Attending: EMERGENCY MEDICINE | Admitting: INTERNAL MEDICINE
Payer: MEDICARE

## 2023-03-06 DIAGNOSIS — J96.01 ACUTE RESPIRATORY FAILURE WITH HYPOXIA AND HYPERCAPNIA (HCC): ICD-10-CM

## 2023-03-06 DIAGNOSIS — G30.9 ALZHEIMER'S DEMENTIA, UNSPECIFIED DEMENTIA SEVERITY, UNSPECIFIED TIMING OF DEMENTIA ONSET, UNSPECIFIED WHETHER BEHAVIORAL, PSYCHOTIC, OR MOOD DISTURBANCE OR ANXIETY (HCC): ICD-10-CM

## 2023-03-06 DIAGNOSIS — F02.80 ALZHEIMER'S DEMENTIA, UNSPECIFIED DEMENTIA SEVERITY, UNSPECIFIED TIMING OF DEMENTIA ONSET, UNSPECIFIED WHETHER BEHAVIORAL, PSYCHOTIC, OR MOOD DISTURBANCE OR ANXIETY (HCC): ICD-10-CM

## 2023-03-06 DIAGNOSIS — J96.02 ACUTE RESPIRATORY FAILURE WITH HYPOXIA AND HYPERCAPNIA (HCC): ICD-10-CM

## 2023-03-06 DIAGNOSIS — J96.01 ACUTE RESPIRATORY FAILURE WITH HYPOXIA (HCC): ICD-10-CM

## 2023-03-06 DIAGNOSIS — A41.9 SEPTICEMIA (HCC): Primary | ICD-10-CM

## 2023-03-06 DIAGNOSIS — J69.0 ASPIRATION PNEUMONIA, UNSPECIFIED ASPIRATION PNEUMONIA TYPE, UNSPECIFIED LATERALITY, UNSPECIFIED PART OF LUNG (HCC): ICD-10-CM

## 2023-03-06 DIAGNOSIS — F03.918 DEMENTIA WITH BEHAVIORAL DISTURBANCE: ICD-10-CM

## 2023-03-06 PROBLEM — J96.00 ACUTE RESPIRATORY FAILURE (HCC): Status: ACTIVE | Noted: 2023-03-06

## 2023-03-06 LAB
A/G RATIO: 0.8 (ref 1.1–2.2)
ALBUMIN SERPL-MCNC: 3.3 G/DL (ref 3.4–5)
ALP BLD-CCNC: 96 U/L (ref 40–129)
ALT SERPL-CCNC: 51 U/L (ref 10–40)
ANION GAP SERPL CALCULATED.3IONS-SCNC: 14 MMOL/L (ref 3–16)
AST SERPL-CCNC: 46 U/L (ref 15–37)
BACTERIA: NORMAL /HPF
BASE EXCESS VENOUS: 0.2 MMOL/L (ref -3–3)
BASOPHILS ABSOLUTE: 0.1 K/UL (ref 0–0.2)
BASOPHILS RELATIVE PERCENT: 0.4 %
BILIRUB SERPL-MCNC: 0.8 MG/DL (ref 0–1)
BILIRUBIN URINE: NEGATIVE
BLOOD, URINE: NEGATIVE
BUN BLDV-MCNC: 39 MG/DL (ref 7–20)
CALCIUM SERPL-MCNC: 9.5 MG/DL (ref 8.3–10.6)
CARBOXYHEMOGLOBIN: 4.1 % (ref 0–1.5)
CHLORIDE BLD-SCNC: 104 MMOL/L (ref 99–110)
CLARITY: CLEAR
CO2: 25 MMOL/L (ref 21–32)
COLOR: YELLOW
CREAT SERPL-MCNC: 1.5 MG/DL (ref 0.8–1.3)
EKG ATRIAL RATE: 116 BPM
EKG DIAGNOSIS: NORMAL
EKG P AXIS: 48 DEGREES
EKG P-R INTERVAL: 132 MS
EKG Q-T INTERVAL: 312 MS
EKG QRS DURATION: 72 MS
EKG QTC CALCULATION (BAZETT): 433 MS
EKG R AXIS: 13 DEGREES
EKG T AXIS: -88 DEGREES
EKG VENTRICULAR RATE: 116 BPM
EOSINOPHILS ABSOLUTE: 0 K/UL (ref 0–0.6)
EOSINOPHILS RELATIVE PERCENT: 0.1 %
EPITHELIAL CELLS, UA: 1 /HPF (ref 0–5)
GFR SERPL CREATININE-BSD FRML MDRD: 46 ML/MIN/{1.73_M2}
GLUCOSE BLD-MCNC: 140 MG/DL (ref 70–99)
GLUCOSE BLD-MCNC: 161 MG/DL (ref 70–99)
GLUCOSE BLD-MCNC: 167 MG/DL (ref 70–99)
GLUCOSE BLD-MCNC: 196 MG/DL (ref 70–99)
GLUCOSE URINE: NEGATIVE MG/DL
HCO3 VENOUS: 26 MMOL/L (ref 23–29)
HCT VFR BLD CALC: 43.5 % (ref 40.5–52.5)
HEMOGLOBIN, VEN, REDUCED: 5 %
HEMOGLOBIN: 14.2 G/DL (ref 13.5–17.5)
HYALINE CASTS: 4 /LPF (ref 0–8)
KETONES, URINE: 15 MG/DL
LACTIC ACID, SEPSIS: 1.9 MMOL/L (ref 0.4–1.9)
LACTIC ACID, SEPSIS: 2.4 MMOL/L (ref 0.4–1.9)
LEUKOCYTE ESTERASE, URINE: NEGATIVE
LYMPHOCYTES ABSOLUTE: 0.7 K/UL (ref 1–5.1)
LYMPHOCYTES RELATIVE PERCENT: 4.7 %
MCH RBC QN AUTO: 30.5 PG (ref 26–34)
MCHC RBC AUTO-ENTMCNC: 32.7 G/DL (ref 31–36)
MCV RBC AUTO: 93.2 FL (ref 80–100)
METHEMOGLOBIN VENOUS: 0 %
MICROSCOPIC EXAMINATION: YES
MONOCYTES ABSOLUTE: 0.4 K/UL (ref 0–1.3)
MONOCYTES RELATIVE PERCENT: 2.6 %
NEUTROPHILS ABSOLUTE: 13.1 K/UL (ref 1.7–7.7)
NEUTROPHILS RELATIVE PERCENT: 92.2 %
NITRITE, URINE: NEGATIVE
O2 CONTENT, VEN: 20 VOL %
O2 SAT, VEN: 95 %
O2 THERAPY: ABNORMAL
PCO2, VEN: 45.5 MMHG (ref 40–50)
PDW BLD-RTO: 15.1 % (ref 12.4–15.4)
PERFORMED ON: ABNORMAL
PH UA: 5 (ref 5–8)
PH VENOUS: 7.37 (ref 7.35–7.45)
PLATELET # BLD: 203 K/UL (ref 135–450)
PMV BLD AUTO: 7.6 FL (ref 5–10.5)
PO2, VEN: 79.9 MMHG (ref 25–40)
POTASSIUM REFLEX MAGNESIUM: 5.6 MMOL/L (ref 3.5–5.1)
PRO-BNP: 1096 PG/ML (ref 0–449)
PROTEIN UA: 30 MG/DL
RAPID INFLUENZA  B AGN: NEGATIVE
RAPID INFLUENZA A AGN: NEGATIVE
RBC # BLD: 4.66 M/UL (ref 4.2–5.9)
RBC UA: 2 /HPF (ref 0–4)
SARS-COV-2, NAAT: NOT DETECTED
SODIUM BLD-SCNC: 143 MMOL/L (ref 136–145)
SPECIFIC GRAVITY UA: 1.02 (ref 1–1.03)
TCO2 CALC VENOUS: 62 MMOL/L
TOTAL PROTEIN: 7.7 G/DL (ref 6.4–8.2)
TROPONIN: 0.19 NG/ML
URINE REFLEX TO CULTURE: ABNORMAL
URINE TYPE: ABNORMAL
UROBILINOGEN, URINE: 1 E.U./DL
WBC # BLD: 14.2 K/UL (ref 4–11)
WBC UA: 5 /HPF (ref 0–5)

## 2023-03-06 PROCEDURE — 85025 COMPLETE CBC W/AUTO DIFF WBC: CPT

## 2023-03-06 PROCEDURE — 87635 SARS-COV-2 COVID-19 AMP PRB: CPT

## 2023-03-06 PROCEDURE — 94640 AIRWAY INHALATION TREATMENT: CPT

## 2023-03-06 PROCEDURE — 6360000002 HC RX W HCPCS: Performed by: EMERGENCY MEDICINE

## 2023-03-06 PROCEDURE — 6370000000 HC RX 637 (ALT 250 FOR IP): Performed by: INTERNAL MEDICINE

## 2023-03-06 PROCEDURE — 87040 BLOOD CULTURE FOR BACTERIA: CPT

## 2023-03-06 PROCEDURE — 6370000000 HC RX 637 (ALT 250 FOR IP): Performed by: EMERGENCY MEDICINE

## 2023-03-06 PROCEDURE — 81001 URINALYSIS AUTO W/SCOPE: CPT

## 2023-03-06 PROCEDURE — 71045 X-RAY EXAM CHEST 1 VIEW: CPT

## 2023-03-06 PROCEDURE — 92610 EVALUATE SWALLOWING FUNCTION: CPT

## 2023-03-06 PROCEDURE — 2580000003 HC RX 258: Performed by: INTERNAL MEDICINE

## 2023-03-06 PROCEDURE — 80053 COMPREHEN METABOLIC PANEL: CPT

## 2023-03-06 PROCEDURE — 83880 ASSAY OF NATRIURETIC PEPTIDE: CPT

## 2023-03-06 PROCEDURE — 96365 THER/PROPH/DIAG IV INF INIT: CPT

## 2023-03-06 PROCEDURE — 84484 ASSAY OF TROPONIN QUANT: CPT

## 2023-03-06 PROCEDURE — 51702 INSERT TEMP BLADDER CATH: CPT

## 2023-03-06 PROCEDURE — 93010 ELECTROCARDIOGRAM REPORT: CPT | Performed by: INTERNAL MEDICINE

## 2023-03-06 PROCEDURE — 1200000000 HC SEMI PRIVATE

## 2023-03-06 PROCEDURE — 82803 BLOOD GASES ANY COMBINATION: CPT

## 2023-03-06 PROCEDURE — 99285 EMERGENCY DEPT VISIT HI MDM: CPT

## 2023-03-06 PROCEDURE — 83605 ASSAY OF LACTIC ACID: CPT

## 2023-03-06 PROCEDURE — 36415 COLL VENOUS BLD VENIPUNCTURE: CPT

## 2023-03-06 PROCEDURE — 93005 ELECTROCARDIOGRAM TRACING: CPT | Performed by: EMERGENCY MEDICINE

## 2023-03-06 PROCEDURE — 6360000002 HC RX W HCPCS: Performed by: INTERNAL MEDICINE

## 2023-03-06 PROCEDURE — 2580000003 HC RX 258: Performed by: EMERGENCY MEDICINE

## 2023-03-06 PROCEDURE — 96368 THER/DIAG CONCURRENT INF: CPT

## 2023-03-06 PROCEDURE — 87804 INFLUENZA ASSAY W/OPTIC: CPT

## 2023-03-06 PROCEDURE — 2700000000 HC OXYGEN THERAPY PER DAY

## 2023-03-06 PROCEDURE — 94761 N-INVAS EAR/PLS OXIMETRY MLT: CPT

## 2023-03-06 RX ORDER — IPRATROPIUM BROMIDE AND ALBUTEROL SULFATE 2.5; .5 MG/3ML; MG/3ML
1 SOLUTION RESPIRATORY (INHALATION) ONCE
Status: COMPLETED | OUTPATIENT
Start: 2023-03-06 | End: 2023-03-06

## 2023-03-06 RX ORDER — ENOXAPARIN SODIUM 100 MG/ML
30 INJECTION SUBCUTANEOUS DAILY
Status: DISCONTINUED | OUTPATIENT
Start: 2023-03-06 | End: 2023-03-06 | Stop reason: DRUGHIGH

## 2023-03-06 RX ORDER — LOSARTAN POTASSIUM 25 MG/1
50 TABLET ORAL DAILY
Status: DISCONTINUED | OUTPATIENT
Start: 2023-03-06 | End: 2023-03-08 | Stop reason: HOSPADM

## 2023-03-06 RX ORDER — ALPRAZOLAM 0.5 MG/1
1 TABLET ORAL DAILY
Status: DISCONTINUED | OUTPATIENT
Start: 2023-03-06 | End: 2023-03-06

## 2023-03-06 RX ORDER — IPRATROPIUM BROMIDE AND ALBUTEROL SULFATE 2.5; .5 MG/3ML; MG/3ML
1 SOLUTION RESPIRATORY (INHALATION) EVERY 4 HOURS PRN
Status: DISCONTINUED | OUTPATIENT
Start: 2023-03-06 | End: 2023-03-08 | Stop reason: HOSPADM

## 2023-03-06 RX ORDER — AMLODIPINE BESYLATE 5 MG/1
2.5 TABLET ORAL NIGHTLY
Status: DISCONTINUED | OUTPATIENT
Start: 2023-03-06 | End: 2023-03-08 | Stop reason: HOSPADM

## 2023-03-06 RX ORDER — QUETIAPINE FUMARATE 25 MG/1
25 TABLET, FILM COATED ORAL NIGHTLY
Status: DISCONTINUED | OUTPATIENT
Start: 2023-03-06 | End: 2023-03-08 | Stop reason: HOSPADM

## 2023-03-06 RX ORDER — LORAZEPAM 0.5 MG/1
0.5 TABLET ORAL EVERY 4 HOURS PRN
Status: DISCONTINUED | OUTPATIENT
Start: 2023-03-06 | End: 2023-03-08 | Stop reason: HOSPADM

## 2023-03-06 RX ORDER — ALPRAZOLAM 1 MG/1
1 TABLET ORAL DAILY
Status: ON HOLD | COMMUNITY
End: 2023-03-08 | Stop reason: HOSPADM

## 2023-03-06 RX ORDER — DIVALPROEX SODIUM 125 MG/1
125 CAPSULE, COATED PELLETS ORAL
Status: DISCONTINUED | OUTPATIENT
Start: 2023-03-07 | End: 2023-03-08 | Stop reason: HOSPADM

## 2023-03-06 RX ORDER — ACETAMINOPHEN 325 MG/1
650 TABLET ORAL EVERY 4 HOURS PRN
Status: DISCONTINUED | OUTPATIENT
Start: 2023-03-06 | End: 2023-03-08 | Stop reason: HOSPADM

## 2023-03-06 RX ORDER — IPRATROPIUM BROMIDE AND ALBUTEROL SULFATE 2.5; .5 MG/3ML; MG/3ML
1 SOLUTION RESPIRATORY (INHALATION)
Status: DISCONTINUED | OUTPATIENT
Start: 2023-03-06 | End: 2023-03-06

## 2023-03-06 RX ORDER — IPRATROPIUM BROMIDE AND ALBUTEROL SULFATE 2.5; .5 MG/3ML; MG/3ML
1 SOLUTION RESPIRATORY (INHALATION) 3 TIMES DAILY
Status: DISCONTINUED | OUTPATIENT
Start: 2023-03-06 | End: 2023-03-08 | Stop reason: HOSPADM

## 2023-03-06 RX ORDER — ENOXAPARIN SODIUM 100 MG/ML
40 INJECTION SUBCUTANEOUS DAILY
Status: DISCONTINUED | OUTPATIENT
Start: 2023-03-06 | End: 2023-03-07

## 2023-03-06 RX ORDER — SODIUM CHLORIDE, SODIUM LACTATE, POTASSIUM CHLORIDE, AND CALCIUM CHLORIDE .6; .31; .03; .02 G/100ML; G/100ML; G/100ML; G/100ML
500 INJECTION, SOLUTION INTRAVENOUS ONCE
Status: COMPLETED | OUTPATIENT
Start: 2023-03-06 | End: 2023-03-06

## 2023-03-06 RX ORDER — DIVALPROEX SODIUM 250 MG/1
250 TABLET, EXTENDED RELEASE ORAL NIGHTLY
Status: DISCONTINUED | OUTPATIENT
Start: 2023-03-06 | End: 2023-03-08 | Stop reason: HOSPADM

## 2023-03-06 RX ORDER — ACETAMINOPHEN 650 MG/1
650 SUPPOSITORY RECTAL ONCE
Status: COMPLETED | OUTPATIENT
Start: 2023-03-06 | End: 2023-03-06

## 2023-03-06 RX ORDER — LORAZEPAM 2 MG/ML
0.5 INJECTION INTRAMUSCULAR EVERY 4 HOURS PRN
Status: DISCONTINUED | OUTPATIENT
Start: 2023-03-06 | End: 2023-03-08 | Stop reason: HOSPADM

## 2023-03-06 RX ORDER — SODIUM CHLORIDE 9 MG/ML
INJECTION, SOLUTION INTRAVENOUS CONTINUOUS
Status: DISCONTINUED | OUTPATIENT
Start: 2023-03-06 | End: 2023-03-08 | Stop reason: HOSPADM

## 2023-03-06 RX ORDER — ATORVASTATIN CALCIUM 40 MG/1
40 TABLET, FILM COATED ORAL NIGHTLY
Status: DISCONTINUED | OUTPATIENT
Start: 2023-03-06 | End: 2023-03-07

## 2023-03-06 RX ADMIN — SODIUM CHLORIDE: 9 INJECTION, SOLUTION INTRAVENOUS at 15:10

## 2023-03-06 RX ADMIN — IPRATROPIUM BROMIDE AND ALBUTEROL SULFATE 1 AMPULE: .5; 3 SOLUTION RESPIRATORY (INHALATION) at 21:31

## 2023-03-06 RX ADMIN — ACETAMINOPHEN 650 MG: 650 SUPPOSITORY RECTAL at 08:19

## 2023-03-06 RX ADMIN — IPRATROPIUM BROMIDE AND ALBUTEROL SULFATE 1 AMPULE: .5; 3 SOLUTION RESPIRATORY (INHALATION) at 15:54

## 2023-03-06 RX ADMIN — CEFEPIME 2000 MG: 2 INJECTION, POWDER, FOR SOLUTION INTRAVENOUS at 09:25

## 2023-03-06 RX ADMIN — ENOXAPARIN SODIUM 40 MG: 100 INJECTION SUBCUTANEOUS at 17:14

## 2023-03-06 RX ADMIN — IPRATROPIUM BROMIDE AND ALBUTEROL SULFATE 1 AMPULE: .5; 3 SOLUTION RESPIRATORY (INHALATION) at 08:03

## 2023-03-06 RX ADMIN — PIPERACILLIN AND TAZOBACTAM 3375 MG: 3; .375 INJECTION, POWDER, FOR SOLUTION INTRAVENOUS at 21:41

## 2023-03-06 RX ADMIN — SODIUM CHLORIDE, POTASSIUM CHLORIDE, SODIUM LACTATE AND CALCIUM CHLORIDE 500 ML: 600; 310; 30; 20 INJECTION, SOLUTION INTRAVENOUS at 09:30

## 2023-03-06 RX ADMIN — VANCOMYCIN HYDROCHLORIDE 1000 MG: 1 INJECTION, POWDER, LYOPHILIZED, FOR SOLUTION INTRAVENOUS at 09:59

## 2023-03-06 RX ADMIN — PIPERACILLIN AND TAZOBACTAM 4500 MG: 4; .5 INJECTION, POWDER, FOR SOLUTION INTRAVENOUS at 17:16

## 2023-03-06 ASSESSMENT — PAIN SCALES - PAIN ASSESSMENT IN ADVANCED DEMENTIA (PAINAD)
FACIALEXPRESSION: 0
CONSOLABILITY: 0
TOTALSCORE: 0
BREATHING: 0
BODYLANGUAGE: 0
NEGVOCALIZATION: 0

## 2023-03-06 ASSESSMENT — PAIN SCALES - GENERAL
PAINLEVEL_OUTOF10: 0
PAINLEVEL_OUTOF10: 0

## 2023-03-06 NOTE — RT PROTOCOL NOTE
RT Inhaler-Nebulizer Bronchodilator Protocol Note    There is a bronchodilator order in the chart from a provider indicating to follow the RT Bronchodilator Protocol and there is an Initiate RT Inhaler-Nebulizer Bronchodilator Protocol order as well (see protocol at bottom of note). CXR Findings:  XR CHEST PORTABLE    Result Date: 3/6/2023  Mild bibasilar airspace opacities. This could reflect atelectasis versus pneumonia in the appropriate clinical setting. The findings from the last RT Protocol Assessment were as follows:   History Pulmonary Disease: None or smoker <15 pack years  Respiratory Pattern: Mild dyspnea at rest, irregular pattern, or RR 21-25 bpm  Breath Sounds: Slightly diminished and/or crackles  Cough: Weak, non-productive  Indication for Bronchodilator Therapy:    Bronchodilator Assessment Score: 9    Aerosolized bronchodilator medication orders have been revised according to the RT Inhaler-Nebulizer Bronchodilator Protocol below. Respiratory Therapist to perform RT Therapy Protocol Assessment initially then follow the protocol. Repeat RT Therapy Protocol Assessment PRN for score 0-3 or on second treatment, BID, and PRN for scores above 3. No Indications - adjust the frequency to every 6 hours PRN wheezing or bronchospasm, if no treatments needed after 48 hours then discontinue using Per Protocol order mode. If indication present, adjust the RT bronchodilator orders based on the Bronchodilator Assessment Score as indicated below. Use Inhaler orders unless patient has one or more of the following: on home nebulizer, not able to hold breath for 10 seconds, is not alert and oriented, cannot activate and use MDI correctly, or respiratory rate 25 breaths per minute or more, then use the equivalent nebulizer order(s) with same Frequency and PRN reasons based on the score. If a patient is on this medication at home then do not decrease Frequency below that used at home.     0-3 - enter or revise RT bronchodilator order(s) to equivalent RT Bronchodilator order with Frequency of every 4 hours PRN for wheezing or increased work of breathing using Per Protocol order mode. 4-6 - enter or revise RT Bronchodilator order(s) to two equivalent RT bronchodilator orders with one order with BID Frequency and one order with Frequency of every 4 hours PRN wheezing or increased work of breathing using Per Protocol order mode. 7-10 - enter or revise RT Bronchodilator order(s) to two equivalent RT bronchodilator orders with one order with TID Frequency and one order with Frequency of every 4 hours PRN wheezing or increased work of breathing using Per Protocol order mode. 11-13 - enter or revise RT Bronchodilator order(s) to one equivalent RT bronchodilator order with QID Frequency and an Albuterol order with Frequency of every 4 hours PRN wheezing or increased work of breathing using Per Protocol order mode. Greater than 13 - enter or revise RT Bronchodilator order(s) to one equivalent RT bronchodilator order with every 4 hours Frequency and an Albuterol order with Frequency of every 2 hours PRN wheezing or increased work of breathing using Per Protocol order mode. RT to enter RT Home Evaluation for COPD & MDI Assessment order using Per Protocol order mode.     Electronically signed by Lora Cunha RCP on 3/6/2023 at 3:57 PM

## 2023-03-06 NOTE — CARE COORDINATION
SW reviewed pt's chart and saw pt came from [facility]. Patient was recently admitted at [facility]. Seng Allen in admissions at [facility] to confirm pt's level of care at the facility to coordinate an appropriate discharge plan. Patient has dementia so will need to contact family with any case management updates. Addendum:  Received a call back from Rommel at [facility] stating that patient was SNF at their facility. Would need new PT/OT evals to return. Met with pt's dtr, Roberta Berry, in ED who stated that her first choice for patient when he was at [facility] was [facility] because it is so close to where she and family lives. Stated they did not have beds last week which was why pt was discharged to their sister facility [facility]. Stated the facility is ok but a very far drive. Asked SW to reach out to to [facility] admissions to see if they have beds and if pt can discharge to this facility. DIONNA called Linnea Ye in admissions, [phone], and left a message.     Electronically signed by WILIAM You, LSW on 3/6/2023 at 12:36 PM

## 2023-03-06 NOTE — PROGRESS NOTES
03/06/23 1556   RT Protocol   History Pulmonary Disease 0   Respiratory pattern 4   Breath sounds 2   Cough 3   Bronchodilator Assessment Score 9

## 2023-03-06 NOTE — ED NOTES
Pt arrive via squad 0730 this am on 15L non-rebreather, audible gurgling. Per EMT pt is DNR-CC. This nurse spoke with [facility] pt is new and does not have a code status. Bora Khan (daughter) notified of pt's condition. This nurse with Dr. Haley Jurado verified verbal code status RT in suctioned pt, br tx given. DNR-CC. Pt is 99% on 8L Non-Rebreather at this time. RT in route re-access.      Dick Crews RN  03/06/23 7437

## 2023-03-06 NOTE — ED PROVIDER NOTES
EMERGENCY DEPARTMENT PROVIDER NOTE    Patient Identification  Pt Name: Joselyn Dixon  MRN: [id number]  [place] 1940  Date of evaluation: 3/6/2023  Provider: Boom Sprague DO  PCP: Frederic Hsieh    Chief Complaint  Altered Mental Status (Pt has dementia nursing home states pt is usually responsive and uses non-sense words. Pt is gurgling, 93% on 15L non-rebreather. From [facility] EMT.)      HPI  (History provided by family member daughter and EMS)  This is a 82 y.o. male with pertinent past medical history of Alzheimer's dementia, diabetes, hypertension who was brought in by EMS transportation for altered mental status and respiratory distress. Patient arrives obtunded and is unable to contribute meaningfully to history. Per EMS, patient was found by nursing home staff just prior to arrival to be minimally responsive, typically is alert and conversant. He was hypoxic in the 70s with gurgling respirations and was placed on nonrebreather. On arrival to the emergency department patient is critically ill-appearing, saturations 93% on NRB. Had recent admission at [facility] on 2/25/2023 for respiratory failure secondary to aspiration pneumonia. I have reviewed the following nursing documentation:  Allergies: Patient has no known allergies. Past medical history: No past medical history on file. Past surgical history: No past surgical history on file.     Home medications:   Previous Medications    AMLODIPINE (NORVASC) 2.5 MG TABLET    Take 1 tablet by mouth at bedtime    ATORVASTATIN (LIPITOR) 40 MG TABLET    atorvastatin 40 mg tablet   TAKE 1 TABLET BY MOUTH EVERY DAY AT BEDTIME    DIVALPROEX (DEPAKOTE ER) 250 MG EXTENDED RELEASE TABLET    Take 1 tablet by mouth at bedtime    DIVALPROEX (DEPAKOTE SPRINKLE) 125 MG DR CAPSULE    Take 1 capsule by mouth every morning (before breakfast)    GLIMEPIRIDE (AMARYL) 1 MG TABLET    TAKE 1 TABLET BY MOUTH EVERY DAY    LOSARTAN (COZAAR) 100 MG TABLET    Take 0.5 tablets by mouth daily    QUETIAPINE (SEROQUEL) 25 MG TABLET    Take 1 tablet by mouth nightly       Social history:  reports that he has never smoked. He has never used smokeless tobacco.    Family history:  No family history on file. Exam  ED Triage Vitals [03/06/23 0746]   BP Temp Temp Source Heart Rate Resp SpO2 Height Weight   124/70 (!) 100.5 °F (38.1 °C) Rectal (!) 108 30 (!) 15 % -- --     Nursing note and vitals reviewed. Constitutional: Well developed, well nourished. Toxic appearing. HENT:      Head: Normocephalic and atraumatic. Ears: External ears normal.      Nose: Nose normal.     Mouth: Membrane mucosa dry and pink. Eyes: Anicteric sclera. No discharge. Neck: Supple. Trachea midline. No meningismus. Cardiovascular: Tachycardia, regular rhythm; no murmurs, rubs, or gallops. Pulmonary/Chest: Moderate respiratory distress with tachypnea and supraclavicular retractions. No stridor. Breath sounds coarse throughout with bibasilar rales. Abdominal: Soft. No distension. No distress elicited with deep palpation all quadrants. Musculoskeletal: Moves all extremities. No gross deformity. Neurological: Somnolent, opens eyes briefly to sternal rub. Does not answer questions or follow commands. Briefly withdraws all extremities to pressure. Skin: Warm and dry. No rash. Procedures      EKG    EKG was reviewed by emergency department physician in the absence of a cardiologist    Narrow complex sinus rhythm, rate 116, normal axis, normal ID and QRS intervals, normal Qtc, no ST elevations, diffuse ST depressions in inferior, septal and lateral leads, normal t-wave morphology, impression sinus tachycardia with nonspecific ST morphology, no STEMI      Radiology  XR CHEST PORTABLE   Final Result   Mild bibasilar airspace opacities. This could reflect atelectasis versus   pneumonia in the appropriate clinical setting.              Any applicable radiology studies including x-ray, CT, MRI, and/or ultrasound, were reviewed independently by me in addition to the radiologist.  I reviewed all radiology images and reports as well from this evaluation.     Labs  Results for orders placed or performed during the hospital encounter of 03/06/23   COVID-19, Rapid    Specimen: Nasopharyngeal Swab   Result Value Ref Range    SARS-CoV-2, NAAT Not Detected Not Detected   Rapid influenza A/B antigens    Specimen: Nasopharyngeal   Result Value Ref Range    Rapid Influenza A Ag Negative Negative    Rapid Influenza B Ag Negative Negative   CBC with Auto Differential   Result Value Ref Range    WBC 14.2 (H) 4.0 - 11.0 K/uL    RBC 4.66 4.20 - 5.90 M/uL    Hemoglobin 14.2 13.5 - 17.5 g/dL    Hematocrit 43.5 40.5 - 52.5 %    MCV 93.2 80.0 - 100.0 fL    MCH 30.5 26.0 - 34.0 pg    MCHC 32.7 31.0 - 36.0 g/dL    RDW 15.1 12.4 - 15.4 %    Platelets 709 242 - 884 K/uL    MPV 7.6 5.0 - 10.5 fL    Neutrophils % 92.2 %    Lymphocytes % 4.7 %    Monocytes % 2.6 %    Eosinophils % 0.1 %    Basophils % 0.4 %    Neutrophils Absolute 13.1 (H) 1.7 - 7.7 K/uL    Lymphocytes Absolute 0.7 (L) 1.0 - 5.1 K/uL    Monocytes Absolute 0.4 0.0 - 1.3 K/uL    Eosinophils Absolute 0.0 0.0 - 0.6 K/uL    Basophils Absolute 0.1 0.0 - 0.2 K/uL   CMP w/ Reflex to MG   Result Value Ref Range    Sodium 143 136 - 145 mmol/L    Potassium reflex Magnesium 5.6 (H) 3.5 - 5.1 mmol/L    Chloride 104 99 - 110 mmol/L    CO2 25 21 - 32 mmol/L    Anion Gap 14 3 - 16    Glucose 161 (H) 70 - 99 mg/dL    BUN 39 (H) 7 - 20 mg/dL    Creatinine 1.5 (H) 0.8 - 1.3 mg/dL    Est, Glom Filt Rate 46 (A) >60    Calcium 9.5 8.3 - 10.6 mg/dL    Total Protein 7.7 6.4 - 8.2 g/dL    Albumin 3.3 (L) 3.4 - 5.0 g/dL    Albumin/Globulin Ratio 0.8 (L) 1.1 - 2.2    Total Bilirubin 0.8 0.0 - 1.0 mg/dL    Alkaline Phosphatase 96 40 - 129 U/L    ALT 51 (H) 10 - 40 U/L    AST 46 (H) 15 - 37 U/L   Lactate, Sepsis   Result Value Ref Range    Lactic Acid, Sepsis 1.9 0.4 - 1.9 mmol/L   Lactate, Sepsis   Result Value Ref Range    Lactic Acid, Sepsis 2.4 (H) 0.4 - 1.9 mmol/L   Blood Gas, Venous   Result Value Ref Range    pH, Galo 7.366 7.350 - 7.450    pCO2, Galo 45.5 40.0 - 50.0 mmHg    pO2, Galo 79.9 (H) 25.0 - 40.0 mmHg    HCO3, Venous 26.0 23.0 - 29.0 mmol/L    Base Excess, Galo 0.2 -3.0 - 3.0 mmol/L    O2 Sat, Galo 95 Not Established %    Carboxyhemoglobin 4.1 (H) 0.0 - 1.5 %    MetHgb, Galo 0.0 <1.5 %    TC02 (Calc), Galo 62 Not Established mmol/L    O2 Content, Galo 20 Not Established VOL %    O2 Therapy Unknown     Hemoglobin, Galo, Reduced 5 %   Troponin   Result Value Ref Range    Troponin 0.19 (H) <0.01 ng/mL   BNP   Result Value Ref Range    Pro-BNP 1,096 (H) 0 - 449 pg/mL   Urinalysis with Reflex to Culture    Specimen: Urine   Result Value Ref Range    Color, UA Yellow Straw/Yellow    Clarity, UA Clear Clear    Glucose, Ur Negative Negative mg/dL    Bilirubin Urine Negative Negative    Ketones, Urine 15 (A) Negative mg/dL    Specific Gravity, UA 1.020 1.005 - 1.030    Blood, Urine Negative Negative    pH, UA 5.0 5.0 - 8.0    Protein, UA 30 (A) Negative mg/dL    Urobilinogen, Urine 1.0 <2.0 E.U./dL    Nitrite, Urine Negative Negative    Leukocyte Esterase, Urine Negative Negative    Microscopic Examination YES     Urine Type NotGiven     Urine Reflex to Culture Not Indicated    Microscopic Urinalysis   Result Value Ref Range    Bacteria, UA None Seen None Seen /HPF    Hyaline Casts, UA 4 0 - 8 /LPF    WBC, UA 5 0 - 5 /HPF    RBC, UA 2 0 - 4 /HPF    Epithelial Cells, UA 1 0 - 5 /HPF   EKG 12 Lead   Result Value Ref Range    Ventricular Rate 116 BPM    Atrial Rate 116 BPM    P-R Interval 132 ms    QRS Duration 72 ms    Q-T Interval 312 ms    QTc Calculation (Bazett) 433 ms    P Axis 48 degrees    R Axis 13 degrees    T Axis -88 degrees    Diagnosis       Sinus tachycardiaNonspecific ST and T wave abnormality       Screenings           Is this patient to be included in the SEP-1 Core Measure due  to severe sepsis or septic shock? No   Exclusion criteria - the patient is NOT to be included for SEP-1 Core Measure due to:  May have criteria for sepsis, but does not meet criteria for severe sepsis or septic shock      MDM and ED Course    Patient arrives minimally responsive and toxic appearing. He demonstrates continued respiratory distress despite hypoxia corrected by nonrebreather. Not hypotensive, not in shock. No hypoglycemia. Shortly after patient's arrival to the emergency department, I held discussion with his daughter and Daniel Chand over the phone. Daughter states that she is in the process of transitioning patient to DNR and wishes to enact DNR status at this time. Daughter states that patient has advanced Alzheimer's dementia with poor quality of life and and is no longer able to recognize even close family members. Daughter does not want CPR, defibrillation/cardioversion, intubation or invasive procedures such as central lines to be performed under any circumstances. Daughter understands that without these interventions patient may progress to natural death. She does wish for continued medical treatment with peripheral IV access, fluids and intravenous medications as necessary. Patient underwent suctioning with copious sputum production by RT with improvement in his respiratory status. EKG no STEMI, troponin modestly elevated however not significantly increased from baseline, doubt ACS. No malignant dysrhythmia. CXR with bibasilar opacities, given patient's fevers and leukocytosis my concern for aspiration pneumonia is very high. Pulmonary embolism was considered however this is not the most likely diagnosis. Very low suspicion for CNS infection. Patient received cefepime and vancomycin. On my reevaluation he remained somnolent, however would briefly open eyes spontaneously and his work of breathing was much improved. Patient remains critically ill.   He does meet criteria for sepsis, however lactic acid normal, not hypotensive, not in shock. I discussed case with Dr. Joya Edge for internal medicine team and will plan for admission. CODE STATUS changed to DNR in accordance with family's wishes. During the patient's ED course, the patient was given:  Medications   cefepime (MAXIPIME) 2,000 mg in sodium chloride 0.9 % 50 mL IVPB (mini-bag) (2,000 mg IntraVENous New Bag 3/6/23 0925)   vancomycin 1000 mg IVPB in 250 mL NS addavial (has no administration in time range)   lactated ringers bolus (500 mLs IntraVENous New Bag 3/6/23 0930)   ipratropium-albuterol (DUONEB) nebulizer solution 1 ampule (1 ampule Inhalation Given 3/6/23 0803)   acetaminophen (TYLENOL) suppository 650 mg (650 mg Rectal Given 3/6/23 0819)        Consults:  None        History obtained from: Family (daughter) and EMS    Pertinent social determinants of health: Mental disability (acquired or congenital)    Chronic conditions potentially affecting care:  Alzheimer's dementia    Review of other records:  Doyce Smoke from outside hospital admission from 2/25/23    Reassessment:  See MDM for details of medications given and reassessment      I Dr. Armando Adler am the primary clinician of record. Critical Care Time    Upon my evaluation, this patient had a high probability of imminent or life-threatening deterioration due to acute encephalopathy, acute hypoxic respiratory failure which required my direct attention, intervention, and personal management. Extensive discussion held with daughter regarding direct patient care and CODE STATUS. The total critical care time personally spent while evaluating and treating this patient was 35 minutes exclusive of any time spent doing separately billable procedures. This includes time at the bedside, data interpretation, medication management, monitoring for potential decompensation and physician consultation.   Specifics of interventions taken and potentially life-threatening diagnostic considerations are listed above in the medical decision making. Final Impression  1. Septicemia ([facility]    2. Acute respiratory failure with hypoxia (HCC)    3. Aspiration pneumonia, unspecified aspiration pneumonia type, unspecified laterality, unspecified part of lung ([facility]    4. Alzheimer's dementia, unspecified dementia severity, unspecified timing of dementia onset, unspecified whether behavioral, psychotic, or mood disturbance or anxiety (HCC)        Blood pressure 124/70, pulse (!) 108, temperature (!) 100.5 °F (38.1 °C), temperature source Rectal, resp. rate 26, SpO2 (!) 15 %. Disposition:  DISPOSITION Decision To Admit 03/06/2023 09:53:20 AM      Patient Referrals:  No follow-up provider specified. Discharge Medications:  New Prescriptions    No medications on file       Discontinued Medications:  Discontinued Medications    No medications on file       This chart was generated using the [address] [place] [address] [address] dictation system. I created this record but it may contain dictation errors given the limitations of this technology.     Esperanza Nam DO (electronically signed)  Attending Emergency Physician       Esperanza Nam DO  03/06/23 9222

## 2023-03-06 NOTE — PROGRESS NOTES
Patient seen in ED, room 11. Admission completed using paperwork provided by [facility] and per daughter Becky Durandonna the POA. RN will need to complete the other required items in addition to the 4 Eyes Assessment, Immunizations, Covid Vaccines, Rights and Responsibilities, orientation to room, Plan of Care, Education/Learning Assessment, Education Plan, white board, height and weight, pain assessment and head to toe assessment. Patient is sleeping and calm. Patient is from [facility] and is being admitted for acute respiratory failure. Home Medication Reconciliation has been completed per pharmacy. Daughter is at bedside POA: Becky Richter and she assisted with admission questions. Patient spouse Catrachita Xiao is also POA but went home for the day. Spoke with Millie Cunningham the  to inform that the POA-daughter is at bedside and daughter needs information on next steps/placement after discharge.

## 2023-03-06 NOTE — PROGRESS NOTES
Patient very sleepy and lethargic since arrival to unit, VSS, daughter at bedside. Patient NPO, very congested, wheezing, suction as needed. IV fluids running per MAR. Melchor running QS, family is requesting to talk to palliative care.  Will continue to monitor

## 2023-03-06 NOTE — PROGRESS NOTES
Facility/Department: [address] NURSING  Speech Language Pathology  DYSPHAGIA BEDSIDE SWALLOW EVALUATION     Patient: Amparo Fabian   : 1940   MRN: [id number]      Evaluation Date: 3/6/2023   Admitting Diagnosis: Acute respiratory failure ([address] [J96.00]  Septicemia ([address] [A41.9]  Acute respiratory failure with hypoxia ([address] [J96.01]  Aspiration pneumonia, unspecified aspiration pneumonia type, unspecified laterality, unspecified part of lung ([address] [J69.0]  Alzheimer's dementia, unspecified dementia severity, unspecified timing of dementia onset, unspecified whether behavioral, psychotic, or mood disturbance or anxiety ([facility] [facility] [G30.9, F02.80]  Pain: Unable to state                                                       H&P: See MD's note     Imaging:  Chest X-ray:   Impression   Mild bibasilar airspace opacities. This could reflect atelectasis versus   pneumonia in the appropriate clinical setting. None found on file. History/Prior Level of Function:   Living Status: Was living with his wife up until previous admission to [facility] then d/c'd to [facility] at the SNF. Prior Dysphagia History: Pt's daughter present for the evaluation stating pt was on a regular texture diet up until most recent admission to [facility] for [address] (suspect aspiration pna). Per daughter report pt has been increasingly more lethargic since coming into the hospital thinking it was due to most recent med changes. States he has been consuming < 50% on his current recommended diet of puree and thins. Per chart review most recent SLP notes from [facility] recommended puree diet with thin liquids on 3/1/23. Pt's daughter reports ongoing cognitive decline in recent months.    Reason for referral: SLP evaluation orders received due to prior hx of dysphagia , pt/family reports of trouble swallowing , suspected aspiration event , new diagnosis of PNA , and cognitive state     Dysphagia Impressions/Diagnosis: Oropharyngeal Dysphagia   Pt mostly lethargic on attempt to complete a clinical swallow evaluation. Daughter present at bedside providing supplemental hx for pt. Pt currently on 6 L of Hi Flow NC with SPO2 in mid 90's. Pt minimally responsive to verbal/ tactile stimulation (sternal/ shoulder rub) and oral stimulation with use of mouth swab. Pt's vocal quality appeared extremely wet and gugrly not able to manage secretions but attempting to clear with ongoing reflexive throat clearing, no indication of reflexive swallows apparent. Placed pt's HOB up higher due to pt unable to manage secretions with clinical s/s correlating with aspiration of secretions. Pt with dried oral secretions on lingual surface and roof of mouth that SLP removed via oral swab. Discussed with pt's daughter and RN that a recommendation should be made for pt to be strictly NPO until SLP re-evaluation when pt is more alert/ able to participate. Discussed with pt's daughter course of swallowing deterioration as Alzheimer's disease progresses. Pt's daughter receptive and aware but also stating pt did not like the pureed diet. Verbalized that pt is unsafe to accept any po at this time and SLP recommended to connect Younker suction to utilize to assist pt in clearing secretions. Both pt's daughter/ RN receptive to recommendations. Pt remains at a HIGH risk of aspiration and a HIGH risk for developing aspiration PNA due to cognitive state, lethargy and suspect significant oropharyngeal dysphagia. Will continue to f/u and re-evaluate as pt is appropriate and is able to participate.      Recommended Diet and Intervention 3/6/2023:  Diet Solids Recommendation:  NPO  Liquid Consistency Recommendation:  NPO Recommended form of Meds: Meds via alternative means  (pt with IV access)      Pt not appropriate for an instrumental assessment at this time due to lethargy     Compensatory Swallowing Strategies:  N/a (ensure thorough oral care is completed multiple times daily - use toothbrush / toothbrush with suction as able)     SHORT TERM DYSPHAGIA GOALS/PLAN OF CARE: Speech therapy for dysphagia tx 3-5 times per week during acute care stay. Pt will functionally tolerate ongoing assessment of swallow function with diet to be determined as indicated. Pt's family will demonstrate understanding of aspiration risk and precautions via education/demonstration with occasional prompting.     Dysphagia Therapeutic Intervention:  Patient/Family Education , To be determined  pending pt's ability to participate/ ability to complete an instrumental assessment     Discharge Recommendations: Discharge recommendations to be determined pending ongoing follow-up during acute care stay    Patient Positioning: Upright in bed  (reclined slightly back in bed)    Current Diet Level (prior to evaluation): Regular texture diet  Thin liquids      Respiratory Status:   []Room Air   [x]O2 via nasal cannula (hi flow 6 L)    []Other:    Dentition:  []Adequate  []Dentures   [x]Missing Many Teeth  []Edentulous  []Other:    Baseline Vocal Quality:  []Normal  []Dysphonic   []Aphonic   []Hoarse  [x]Wet  []Weak  [x]Other: Unable to assess due to reduced alertness     Volitional Cough:  Not Elicited     Volitional Swallow:   [x]Absent   []Delayed     []Adequate     []Required use of drink     Oral Mechanism Exam:  []WFL []Mild   [] Moderate  []Severe  [x]To be assessed / ASHLEY   Impaired:   []Left side      []Right side    []Labial ROM/Coordination    []Labial Symmetry   []Lingual ROM/Coordination   []Lingual Symmetry  []Gag  []Other:     Oral Phase: []WFL []Mild   [] Moderate  []Severe  [x]To be assessed / ASHLEY   []Impaired/Prolonged Mastication:   []Oral Holding:   []Spillage Left:   []Spillage Right:  []Pocketing Left:   []Pocketing Right:   []Decreased Anterior to Posterior Transit:   []Suspected Premature Bolus Loss:   []Lingual/Palatal Residue:   []Other:     Pharyngeal Phase: []WFL []Mild   [] Moderate  []Severe  [x]To be assessed / ASHLEY   []Delayed Swallow:   []Suspected Pharyngeal Pooling:   []Decreased Laryngeal Elevation:   []Absent Swallow:  []Wet Vocal Quality:   [x]Throat Clearing-Immediate: due to suspected pooling of secretions with pt unable to clear    []Throat Clearing-Delayed:   []Cough-Immediate:   []Cough-Delayed:  []Change in Vital Signs:  []Suspected Delayed Pharyngeal Clearing:  []Other:     Eating Assistance:  []Independent  []Setup or clean-up assistance   [] Supervision or touching assistance   [] Partial or moderate assistance   [] Substantial or maximal assistance  [] Dependent  - ASHLEY or determine     EDUCATION:   Provided education regarding role of SLP, results of assessment, recommendations and general speech pathology plan of care. [] Pt verbalized understanding and agreement   [] Pt requires ongoing learning   [x] No evidence of comprehension     If patient discharges prior to next visit, this note will serve as discharge. Treatment time:  Timed Code Treatment Minutes: 0  Total Treatment time: 15    Electronically signed by:    Klarissa HENNESSY CCC-SLP #[id number] 3/6/2023 3:57 PM  Speech-Language Pathologist

## 2023-03-06 NOTE — PROGRESS NOTES
Pharmacy Note - Extended Infusion Beta-Lactam Adjustment    Piperacillin/Tazobactam 3375mg Q8h for treatment of Aspiration Pneumonia. Per [facility] Extended Infusion Beta-Lactam Policy, piperacillin/tazobactam will be changed to 4500mg loading dose followed by 3375mg Q8h extended infusion    Estimated Creatinine Clearance: Estimated Creatinine Clearance: 32 mL/min (A) (based on SCr of 1.5 mg/dL (H)). BMI: There is no height or weight on file to calculate BMI. Please call with any questions.     Thank you,    Suzanne Mercer, [facility]

## 2023-03-06 NOTE — CARE COORDINATION
SW received a call back from [facility] stating that they do not have any beds available in their dementia unit at this time and they do not anticipate having a bed anytime in the next few weeks. Called dtr, Janie Rich, and informed this information. She asked we look into another sister facility of [facility] which is in [facility]. SW found out it was [facility], admissions person is Karl Reece [phone]. Called Karl Reece and left a message asking about bed availability at their facility for SNF or LTC. Waiting on a call back. Addendum:  Received a call back from admissions at [facility] stating they also do not have any beds available in their dementia unit and don't anticipate having any in the near future. Dtr informed of this.       Electronically signed by WILIAM Johnson, LSW on 3/6/2023 at 2:30 PM

## 2023-03-06 NOTE — PROGRESS NOTES
Pharmacist Review and Automatic Dose Adjustment of Prophylactic Enoxaparin    Reviewed reason(s) for admission/hospital problem list    The reviewing pharmacist has made an adjustment to the ordered enoxaparin dose or converted to UFH per the approved [facility] protocol and table as identified below. Suyapa Nuñez is a 82 y.o. male. Recent Labs     03/06/23  0758   CREATININE 1.5*       Estimated Creatinine Clearance: 32 mL/min (A) (based on SCr of 1.5 mg/dL (H)). Recent Labs     03/06/23  0758   HGB 14.2   HCT 43.5        No results for input(s): INR in the last 72 hours.     Height:   Ht Readings from Last 1 Encounters:   02/25/23 5' 4\" (1.626 m)     Weight:  Wt Readings from Last 1 Encounters:   03/03/23 131 lb (59.4 kg)               Plan: Based upon the patient's weight and renal function    Ordered: Enoxaparin 30mg SUBQ Daily    Changed/converted to    New Order: Enoxaparin 40mg SUBQ Daily      Thank you,  Jevon Caceres [facility]  3/6/2023, 2:44 PM

## 2023-03-06 NOTE — CARE COORDINATION
03/06/23 1324   Readmission Assessment   Number of Days since last admission? 1-7 days   Previous Disposition SNF   Who is being Chaya Mcfadden   (Dtr Allison Bingham)   What was the patient's/caregiver's perception as to why they think they needed to return back to the hospital? Other (Comment)  (Altered Mental Status at facility and increase in O2 needs.)   Did you visit your Primary Care Physician after you left the hospital, before you returned this time? No  (n/a - was at SNF)   Why weren't you able to visit your PCP?   (was at SNF)   Did you see a specialist, such as Cardiac, Pulmonary, Orthopedic Physician, etc. after you left the hospital? No   Who advised the patient to return to the hospital? Skilled Unit   Does the patient report anything that got in the way of taking their medications? No   In our efforts to provide the best possible care to you and others like you, can you think of anything that we could have done to help you after you left the hospital the first time, so that you might not have needed to return so soon? Other (Comment)  (No case mgmt needs that could have prevented readmission. Dtr ok w/[facility] facility.   Would like to try and get pt into [facility] which SW will look into this admission.)     Electronically signed by WILIAM Jones, BETITO on 3/6/2023 at 1:26 PM

## 2023-03-06 NOTE — PROGRESS NOTES
Pharmacy Home Medication Reconciliation Note    A medication reconciliation has been completed for Blaire Moore 1940    Pharmacy: [facility], [address] [place] [address]., [facility]  Information provided by: facility    The patient's home medication list is as follows: No current facility-administered medications on file prior to encounter. Current Outpatient Medications on File Prior to Encounter   Medication Sig Dispense Refill    ALPRAZolam (XANAX) 1 MG tablet Take 1 mg by mouth daily. amLODIPine (NORVASC) 2.5 MG tablet Take 1 tablet by mouth at bedtime 30 tablet 3    losartan (COZAAR) 100 MG tablet Take 0.5 tablets by mouth daily 30 tablet 3    QUEtiapine (SEROQUEL) 25 MG tablet Take 1 tablet by mouth nightly 60 tablet 3    divalproex (DEPAKOTE SPRINKLE) 125 MG DR capsule Take 1 capsule by mouth every morning (before breakfast) 60 capsule 3    divalproex (DEPAKOTE ER) 250 MG extended release tablet Take 1 tablet by mouth at bedtime 30 tablet 3    glimepiride (AMARYL) 1 MG tablet TAKE 1 TABLET BY MOUTH EVERY DAY      atorvastatin (LIPITOR) 40 MG tablet atorvastatin 40 mg tablet   TAKE 1 TABLET BY MOUTH EVERY DAY AT BEDTIME       Of note, per facility patient takes Alprazolam 1mg daily for agitation. Timing of last doses updated. Thank you,  Becky Cortez

## 2023-03-07 LAB
ANION GAP SERPL CALCULATED.3IONS-SCNC: 9 MMOL/L (ref 3–16)
BASOPHILS ABSOLUTE: 0.1 K/UL (ref 0–0.2)
BASOPHILS RELATIVE PERCENT: 0.4 %
BUN BLDV-MCNC: 44 MG/DL (ref 7–20)
CALCIUM SERPL-MCNC: 8.6 MG/DL (ref 8.3–10.6)
CHLORIDE BLD-SCNC: 113 MMOL/L (ref 99–110)
CO2: 24 MMOL/L (ref 21–32)
CREAT SERPL-MCNC: 1.8 MG/DL (ref 0.8–1.3)
EOSINOPHILS ABSOLUTE: 0 K/UL (ref 0–0.6)
EOSINOPHILS RELATIVE PERCENT: 0.1 %
GFR SERPL CREATININE-BSD FRML MDRD: 37 ML/MIN/{1.73_M2}
GLUCOSE BLD-MCNC: 138 MG/DL (ref 70–99)
GLUCOSE BLD-MCNC: 139 MG/DL (ref 70–99)
GLUCOSE BLD-MCNC: 146 MG/DL (ref 70–99)
GLUCOSE BLD-MCNC: 154 MG/DL (ref 70–99)
HCT VFR BLD CALC: 35.2 % (ref 40.5–52.5)
HEMOGLOBIN: 11.6 G/DL (ref 13.5–17.5)
LYMPHOCYTES ABSOLUTE: 0.6 K/UL (ref 1–5.1)
LYMPHOCYTES RELATIVE PERCENT: 5.4 %
MCH RBC QN AUTO: 30.6 PG (ref 26–34)
MCHC RBC AUTO-ENTMCNC: 32.9 G/DL (ref 31–36)
MCV RBC AUTO: 93 FL (ref 80–100)
MONOCYTES ABSOLUTE: 0.3 K/UL (ref 0–1.3)
MONOCYTES RELATIVE PERCENT: 2.6 %
NEUTROPHILS ABSOLUTE: 10.5 K/UL (ref 1.7–7.7)
NEUTROPHILS RELATIVE PERCENT: 91.5 %
PDW BLD-RTO: 15.3 % (ref 12.4–15.4)
PERFORMED ON: ABNORMAL
PLATELET # BLD: 143 K/UL (ref 135–450)
PMV BLD AUTO: 7.3 FL (ref 5–10.5)
POTASSIUM SERPL-SCNC: 5 MMOL/L (ref 3.5–5.1)
RBC # BLD: 3.79 M/UL (ref 4.2–5.9)
SODIUM BLD-SCNC: 146 MMOL/L (ref 136–145)
WBC # BLD: 11.5 K/UL (ref 4–11)

## 2023-03-07 PROCEDURE — 80048 BASIC METABOLIC PNL TOTAL CA: CPT

## 2023-03-07 PROCEDURE — 1200000000 HC SEMI PRIVATE

## 2023-03-07 PROCEDURE — 6370000000 HC RX 637 (ALT 250 FOR IP): Performed by: INTERNAL MEDICINE

## 2023-03-07 PROCEDURE — 94761 N-INVAS EAR/PLS OXIMETRY MLT: CPT

## 2023-03-07 PROCEDURE — 6360000002 HC RX W HCPCS: Performed by: INTERNAL MEDICINE

## 2023-03-07 PROCEDURE — 94640 AIRWAY INHALATION TREATMENT: CPT

## 2023-03-07 PROCEDURE — 2580000003 HC RX 258: Performed by: INTERNAL MEDICINE

## 2023-03-07 PROCEDURE — 85025 COMPLETE CBC W/AUTO DIFF WBC: CPT

## 2023-03-07 PROCEDURE — 2700000000 HC OXYGEN THERAPY PER DAY

## 2023-03-07 PROCEDURE — 36415 COLL VENOUS BLD VENIPUNCTURE: CPT

## 2023-03-07 RX ORDER — ACETAMINOPHEN 650 MG/1
650 SUPPOSITORY RECTAL EVERY 4 HOURS PRN
Status: DISCONTINUED | OUTPATIENT
Start: 2023-03-07 | End: 2023-03-08 | Stop reason: HOSPADM

## 2023-03-07 RX ADMIN — ENOXAPARIN SODIUM 40 MG: 100 INJECTION SUBCUTANEOUS at 08:04

## 2023-03-07 RX ADMIN — SODIUM CHLORIDE: 9 INJECTION, SOLUTION INTRAVENOUS at 05:56

## 2023-03-07 RX ADMIN — ACETAMINOPHEN 650 MG: 650 SUPPOSITORY RECTAL at 12:27

## 2023-03-07 RX ADMIN — PIPERACILLIN AND TAZOBACTAM 3375 MG: 3; .375 INJECTION, POWDER, FOR SOLUTION INTRAVENOUS at 20:44

## 2023-03-07 RX ADMIN — LORAZEPAM 0.5 MG: 2 INJECTION INTRAMUSCULAR; INTRAVENOUS at 12:27

## 2023-03-07 RX ADMIN — IPRATROPIUM BROMIDE AND ALBUTEROL SULFATE 1 AMPULE: .5; 3 SOLUTION RESPIRATORY (INHALATION) at 20:23

## 2023-03-07 RX ADMIN — PIPERACILLIN AND TAZOBACTAM 3375 MG: 3; .375 INJECTION, POWDER, FOR SOLUTION INTRAVENOUS at 11:52

## 2023-03-07 RX ADMIN — PIPERACILLIN AND TAZOBACTAM 3375 MG: 3; .375 INJECTION, POWDER, FOR SOLUTION INTRAVENOUS at 05:58

## 2023-03-07 RX ADMIN — LORAZEPAM 0.5 MG: 2 INJECTION INTRAMUSCULAR; INTRAVENOUS at 23:47

## 2023-03-07 RX ADMIN — SODIUM CHLORIDE: 9 INJECTION, SOLUTION INTRAVENOUS at 20:41

## 2023-03-07 RX ADMIN — IPRATROPIUM BROMIDE AND ALBUTEROL SULFATE 1 AMPULE: .5; 3 SOLUTION RESPIRATORY (INHALATION) at 07:50

## 2023-03-07 RX ADMIN — IPRATROPIUM BROMIDE AND ALBUTEROL SULFATE 1 AMPULE: .5; 3 SOLUTION RESPIRATORY (INHALATION) at 12:47

## 2023-03-07 ASSESSMENT — PAIN SCALES - PAIN ASSESSMENT IN ADVANCED DEMENTIA (PAINAD)
NEGVOCALIZATION: 0
FACIALEXPRESSION: 0
CONSOLABILITY: 0
BREATHING: 0
BREATHING: 0
CONSOLABILITY: 0
BREATHING: 0
NEGVOCALIZATION: 0
TOTALSCORE: 0
BODYLANGUAGE: 0
TOTALSCORE: 0
BODYLANGUAGE: 0
TOTALSCORE: 0
BODYLANGUAGE: 0
NEGVOCALIZATION: 0
CONSOLABILITY: 0
CONSOLABILITY: 0
BREATHING: 0
BREATHING: 0
BODYLANGUAGE: 0
BODYLANGUAGE: 0
FACIALEXPRESSION: 0
TOTALSCORE: 0
TOTALSCORE: 0
NEGVOCALIZATION: 0
FACIALEXPRESSION: 0
CONSOLABILITY: 0
NEGVOCALIZATION: 0

## 2023-03-07 ASSESSMENT — PAIN SCALES - GENERAL
PAINLEVEL_OUTOF10: 0
PAINLEVEL_OUTOF10: 0

## 2023-03-07 ASSESSMENT — PAIN SCALES - WONG BAKER: WONGBAKER_NUMERICALRESPONSE: 0

## 2023-03-07 NOTE — PROGRESS NOTES
Physician Progress Note      PATIENT:               Simona Solano  CSN #:                  [id number]  :                       1940  ADMIT DATE:       3/6/2023 7:28 AM  DISCH DATE:  RESPONDING  PROVIDER #:        Cecil Ewing MD          QUERY TEXT:    Pt admitted with aspiration pneumonia. Noted documentation of septicemia in   ED note. If possible, please document in progress notes and discharge   summary:      The medical record reflects the following:  Risk Factors: aspiration Pneumonia, PNA  Clinical Indicators: ED provider note documented \"septicemia\"; Aspiration PNA, PNA, WBC 14.2, Temp 100.5 rectal, Lactic sepsis 2.4, RR 33, HR   108  Treatment: blood culture, IV Zosyn, IV Vancomycin, IV Cefepime  Options provided:  -- Sepsis confirmed present on admission  -- Sepsis ruled out  -- Other - I will add my own diagnosis  -- Disagree - Not applicable / Not valid  -- Disagree - Clinically unable to determine / Unknown  -- Refer to Clinical Documentation Reviewer    PROVIDER RESPONSE TEXT:    The diagnosis of sepsis was confirmed as present on admission. Query created by: Gil Milligan on 3/7/2023 10:19 AM      Electronically signed by:   Cecil Ewing MD 3/7/2023 12:47 PM

## 2023-03-07 NOTE — CARE COORDINATION
Discharge Planning:     (CM) received a call from [facility] staff, April ([phone]), who reported that they will be coming to the hospital tomorrow at 10:00am to meet the family at patient's bedside to discuss hospice.       Colt SWAIN, MANASA, [facility] -   [phone]    Electronically signed by WILIAM Tomas on 3/7/2023 at 2:26 PM

## 2023-03-07 NOTE — PROGRESS NOTES
Nutrition Note    RECOMMENDATIONS  PO Diet: Diet per SLP  ONS: Will order appropriate ONS once diet is able to be resumed     NUTRITION ASSESSMENT   Pt triggered for positive nutrition screen indicating wt loss and poor po intake. Hx of dementia, ASHLEY orientation; information obtained from chart review. Made NPO per SLP yesterday. Wt hx in EMR indicates wt of 131 lb on 3/3, wt today of 117 lb. If accurate, indicates significant wt loss. RD will continue to monitor for po diet to be resumed and will order appropriate ONS to offer additional nutrition. RD will continue to monitor. Nutrition Related Findings: NS at 100 mL/hr. +1.5L. Na 146. HbA1c of 6.4% on 2/26. Glucose 140-196 yesterday, 138 today. No BM documented, BS hypoactive. No edema noted. Wounds: None  Nutrition Education:  Education not indicated   Nutrition Goals: Initiate PO diet, by next RD assessment     MALNUTRITION ASSESSMENT   Acute Illness  Malnutrition Status: Insufficient data    NUTRITION DIAGNOSIS   Inadequate oral intake related to inadequate protein-energy intake as evidenced by NPO or clear liquid status due to medical condition, weight loss    CURRENT NUTRITION THERAPIES  Diet NPO     PO Intake: NPO   PO Supplement Intake:NPO    ANTHROPOMETRICS  Current Height: 5' 4\" (162.6 cm)  Current Weight: 117 lb 12.8 oz (53.4 kg)    Admission weight: 117 lb (53.1 kg) (bed wt)  Ideal Body Weight (IBW): 130 lbs  (59 kg)        BMI: 20.1    COMPARATIVE STANDARDS  Energy (kcal):  4742-3200     Protein (g):         Fluid (mL/day):  7504-0999    The patient will be monitored per nutrition standards of care. Consult dietitian if additional nutrition interventions are needed prior to RD reassessment.      Clay Hauser MS, RD, LD    Contact: [phone]

## 2023-03-07 NOTE — PROGRESS NOTES
Speech Language Pathology  Attempt/Hold  Hillary Gamboa  1940    10:45AM- SLP attempted to complete dysphagia treatment follow up with the pt. Pt's chart reviewed and consultation completed with pt's RN. Pt's RN reports the pt to be very lethargic at this time and not appropriate for PO intake. Will re-attempt as schedule allows and pt appropriate. 14:32PM- SLP attempted to complete dysphagia treatment follow up with the pt. Pt's chart reviewed and consultation completed with pt's RN. Pt's RN reports that the pt continues to demonstrate increased fatigue, requests to hold therapy until tomorrow. Will re-attempt as schedule allows and pt appropriate. Patt Thapa M.S. [facility] [id number] [facility]

## 2023-03-07 NOTE — PROGRESS NOTES
Hospital Problems             Last Modified POA    * (Principal) Acute respiratory failure ([facility] [address] 3/6/2023 Yes    Alzheimer's disease ([facility] [address] 3/7/2023 Yes    Hypertension 3/7/2023 Yes    Type 2 diabetes mellitus ([facility] [address] 3/7/2023 Yes    Altered mental status, unspecified 3/7/2023 Yes    [facility] 3/7/2023 Yes   H&P dictated

## 2023-03-07 NOTE — CONSULTS
PALLIATIVE MEDICINE CONSULTATION     Patient name:Eliceo Patton   [facility]:[id number]    :1940  Room/Bed:[facility]3303/3303-01   LOS: 1 day         Date of consult:3/7/2023    Consult Information  Palliative Medicine Consult performed by: SUZANNE Jurado CNP, CNP    Inpatient consult to Palliative Care  Consult performed by: SUZANNE Jurado CNP  Consult ordered by: Darnell Alvarenga MD  Reason for consult: GOC and code status             ASSESSMENT/RECOMMENDATIONS     82 y.o. male with AMS and dysphagia with end stage Alzheimers      Symptom Management:  AMS- pt difficult to arouse today he has been getting Ativan for agitation  Dysphagia- pt has not been safe to swallow in several days and is not arousing for SLP  Goals of Care-  talked to pts des Khan at bedside spouse is hoping pt can go to [facility] the goal at this point is pts comfort. Specifics of an end-of-life/comfort-focused treatment plan were discussed with the family, including (but not limited to) discontinuation of labs, non-palliative medications, and routine vitals, along with the benefits of hospice enrollment. Family asked multiple appropriate questions, all of which were addressed. Family is unanimous in transitioning to intensive comfort treatment. Pt is DNRCC at baseline. Referral faxed and called to Hospice for [facility] they will contact family and hopefully can come tomorrow and evaluate pt for IPU. Patient/Family Goals of Care :    talked to pts des Khan at bedside spouse is hoping pt can go to [facility] the goal at this point is pts comfort. Specifics of an end-of-life/comfort-focused treatment plan were discussed with the family, including (but not limited to) discontinuation of labs, non-palliative medications, and routine vitals, along with the benefits of hospice enrollment. Family asked multiple appropriate questions, all of which were addressed.  Family is unanimous in transitioning to intensive comfort treatment. Pt is DNRCC at baseline. Referral faxed and called to Hospice for [facility] they will contact family and hopefully can come tomorrow and evaluate pt for IPU. Disposition/Discharge Plan:   pending    Advance Directives: The patient has the following advanced directives on file:  [address] [facility] Will ACP-Advance Directive ACP-Power of     Not on File Filed on 03/06/23 Filed Not on File            The patient has appointed the following active healthcare agents:    Primary Decision Maker: [facility] - [phone]    Secondary Decision Maker: Lilly Uribe - [phone]    The Patient has the following current code status:    Code Status: DNR-CC      Case discussed with: patient, floor RN  Thank you for allowing us to participate in the care of this patient. HISTORY     CC: AMS  HPI: The patient is a 82 y.o. male with past medical history of Alzheimer's dementia, diabetes, hypertension who was brought in by EMS transportation for altered mental status and respiratory distress. Patient arrives obtunded and is unable to contribute meaningfully to history. Per EMS, patient was found by nursing home staff just prior to arrival to be minimally responsive, typically is alert and conversant. He was hypoxic in the 70s with gurgling respirations and was placed on nonrebreather. Palliative Medicine SymptomScreening/ROS:    Review of Systems   Unable to perform ROS: Patient nonverbal     Patient unable to complete full ROS due to current cognitive status. Information that is obtained from nursing and chart. Palliative Performance Scale:     [] 60%  Amb reduced; Sig dz. Can't do hobbies/housework; Intake normal or reduced, Occasional assist; LOC full/confusion   [] 50%  Mainly sit/lie; Extensive disease. Mainly assist, Intake normal or reduced;  Occasional assist; LOC full/confusion   [x] 40%  Mainly in bed; Extensive disease; Mainly assist; Intake normal or reduced; Occasional assist; LOC full/confusion   [] 30%  Bed bound, Extensive disease; Total care; Intake reduced; LOC full/confusion   [] 20%  Bed bound; Extensive disease; Total care; Intake minimal; Drowsy/coma   [] 10%  Bed bound; Extensive disease; Total care; Mouth care only; Drowsy/coma   []  0%   Death       Home med list and hospital medications reviewed in chart as of 3/7/2023     EXAM     Vitals:    03/07/23 1257   BP:    Pulse:    Resp:    Temp:    SpO2: 95%       Physical Exam  Constitutional:       Appearance: He is ill-appearing. HENT:      Head: Normocephalic and atraumatic. Nose: Nose normal.      Mouth/Throat:      Mouth: Mucous membranes are dry. Eyes:      Pupils: Pupils are equal, round, and reactive to light. Cardiovascular:      Rate and Rhythm: Normal rate. Pulses: Normal pulses. Heart sounds: No murmur heard. No gallop. Pulmonary:      Effort: Pulmonary effort is normal. No respiratory distress. Abdominal:      General: Abdomen is flat. Musculoskeletal:      Cervical back: Neck supple. Right lower leg: Edema present. Left lower leg: Edema present. Skin:     General: Skin is dry. Coloration: Skin is pale. Neurological:      Comments: Non-verbal limited responsiveness              OBJECTIVE   BP (!) 146/76   Pulse 78   Temp 98.7 °F (37.1 °C) (Axillary)   Resp 16   Ht 5' 4\" (1.626 m)   Wt 117 lb 12.8 oz (53.4 kg)   SpO2 95%   BMI 20.22 kg/m²   I/O last 3 completed shifts: In: 1962.4 [I.V.:1004.4; IV Piggyback:958]  Out: 450 [Urine:450]  I/O this shift: In: 87.6 [I.V.:44.8;  IV Piggyback:42.7]  Out: -       Palliative Medicine Interventions:    patient/family support  Goals of Care discussions with patient/surrogate  Spiritual Interventions:  referral              DATA:  Current labs in the epic chart reviewed as of 3/7/2023   Review of previous notes, admits, labs, radiology and testing relevant to this consult done in this chart today 3/7/2023    Data Reviewed related to this consultation:    Review of prior external note(s) from each unique source relevant to today's visit: Hospitalist, Case management  Discussion of management or test with external physician/qualified health care professional: Hospitalist, Case management  Unique test results reviewed: CBC and BMP      I have spent a total of 75 minutes on: Performing a medical appropriate examination and/or evaluation    Counseling and educating the patient/family/caregiver  Preparing to see the patient (e.g., review of tests)  Referring / communicating with other healthcare professionals including care coordination (not separately reported):  Hospitalist, Case management  Documenting clinical information in the electronic health record              Signed By: Electronically signed by SUZANNE Guerrero CNP on 3/7/2023 at 2:28 PM  Palliative Medicine     March 7, 2023

## 2023-03-07 NOTE — ACP (ADVANCE CARE PLANNING)
Advance Care Planning   Healthcare Decision Maker:    Primary Decision Maker: Sarah Shaffer - [phone]    Secondary Decision Maker: Rojas Uribe - [phone]    Documented Decision Maker(s) consistent with ACP documents on file, signed and notarized 12/21/2022.

## 2023-03-07 NOTE — H&P
[facility]                     [facility], [address]                              HISTORY AND PHYSICAL    PATIENT NAME: Ko Craig                         :        1940  MED REC NO:   [id number]                          ROOM:       200  ACCOUNT NO:   [de-identified]                           ADMIT DATE: 2023  PROVIDER:     Emma Medina MD    HISTORY OF PRESENT ILLNESS:  The patient is an 79-year-old demented  elderly gentleman, who came to the emergency room from a nursing home  with history of diminished responsiveness, increased oxygen  desaturation. The patient was on 15 liters non-rebreather and still  only at 93%. Nonverbal, unable to provide any communication, which  would be meaningful, somewhat increased agitated and also exhibited air  hunger without any angina pectoris. No unusual leg swelling. PAST MEDICAL HISTORY:  Pertinent for acute respiratory failure with  hypoxia, altered mental status, Alzheimer disease, dementia,  hyperlipidemia, hypertension, generalized muscle weakness, aspiration  pneumonitis, recently type 2 diabetes mellitus. PAST SURGICAL HISTORY:  Pertinent for aortocoronary bypass graft. FAMILY HISTORY:  Both the parents are . Mother had emphysema. Father had atherosclerotic heart disease. MEDICATIONS:  Acetaminophen, amlodipine, Lipitor, Depakote, DuoNeb,  losartan, Seroquel. ALLERGIES:  No known allergies. SOCIAL HISTORY:  He is a  man. Wife lives at home. They have  been  for 60+ years with five children. He was always a  nonsmoker, always a nondrinker. He used to be a  for  [employer]. No history of substance abuse. REVIEW OF SYSTEM:  Negative for convulsions. Does have intermittent  agitation. Does have some somnolence resulting from recent Xanax use. There is some dysphagia and overall failure to thrive with a BMI of  22.49. No exertional angina. No orthopnea or paroxysmal nocturnal  dyspnea. No abdominal pain. No hematemesis or melena. Does have some  air hunger. No intermittent claudication. PHYSICAL EXAMINATION:  GENERAL:  Lethargic, incoherent 82-year-old irritated white man, looking  much older than his stated age. VITAL SIGNS:  His temperature is 100.5, blood pressure 124/70,  respirations 30, heart rate is 108. O2 sat 97% on 7 and 8 liters. HEENT:  Oral mucosa dry. SKIN:  Warm and dry. NECK:  Supple. Mild jugular venous distention. Accessory muscles of  respiration in use. O2 sat requirement is now down to 5 liters after  initial stabilization. LUNGS:  Bronchovesicular breathing pattern with few coarse crackles  without any wheezing. HEART:  Regular rate and rhythm. S1, S2, Tachycardic. ABDOMEN:  Soft, nontender. Bowel sounds present. EXTREMITIES:  Show trace edema. NEUROLOGIC:  The patient appears grossly intact, appears to be moving  all the four extremities without any coordination. LABORATORY DATA:  Shows sodium 143, potassium 5.6, chloride 104, CO2 of  25, BUN 39, creatinine 1.5. Lactic acid level is 2.4. Blood sugar is  167, ProBNP level is 1096. Albumin 3.3, alkaline phosphatase 96,  ammonia level is pending at this time. White blood cell count 14.2,  hemoglobin/hematocrit 14.2 and 43.5, platelet count is 139. Blood  cultures have been drawn. Rapid influenza test A and B are negative. COVID-19 is not detected. Urinalysis, no evidence of urinary tract  infection. ABG shows pH of 7.36, pCO2 of 45.5, pO2 of 79.9, bicarbonate  26. Chest x-ray showed mild bibasilar airspace opacity that could  reflect atelectasis versus pneumonia in the appropriate clinical  setting. EKG:  Sinus tachycardia. ASSESSMENT:  Acute hypoxemic hypercapnic respiratory failure; pneumonia,  possibly due to aspiration; advanced dementia; hypertension. PLAN:  The patient is DNR comfort care. Daughter is present in the  room.   Does not want any extraordinary measures to be done on her  father, is a DNR comfort care. We will give IV Zosyn, nebulized  aerosol, supplemental oxygen, DVT prophylaxis, maintain hydration for  dehydration.         Keely Arora MD    D: 03/07/2023 0:04:43       T: 03/07/2023 0:08:17     [id number]  Job#: [id number]     Doc#: [id number]    CC:

## 2023-03-08 VITALS
OXYGEN SATURATION: 98 % | HEIGHT: 64 IN | RESPIRATION RATE: 18 BRPM | WEIGHT: 119.3 LBS | SYSTOLIC BLOOD PRESSURE: 125 MMHG | HEART RATE: 86 BPM | DIASTOLIC BLOOD PRESSURE: 48 MMHG | TEMPERATURE: 98.3 F | BODY MASS INDEX: 20.37 KG/M2

## 2023-03-08 PROCEDURE — 6360000002 HC RX W HCPCS: Performed by: INTERNAL MEDICINE

## 2023-03-08 PROCEDURE — 2580000003 HC RX 258: Performed by: INTERNAL MEDICINE

## 2023-03-08 RX ORDER — MORPHINE SULFATE 20 MG/ML
5 SOLUTION ORAL EVERY 4 HOURS PRN
Qty: 30 ML | Refills: 0 | Status: SHIPPED | OUTPATIENT
Start: 2023-03-08 | End: 2023-03-11

## 2023-03-08 RX ORDER — IPRATROPIUM BROMIDE AND ALBUTEROL SULFATE 2.5; .5 MG/3ML; MG/3ML
3 SOLUTION RESPIRATORY (INHALATION) 3 TIMES DAILY
Qty: 360 ML | Refills: 0 | Status: SHIPPED | OUTPATIENT
Start: 2023-03-08

## 2023-03-08 RX ORDER — LORAZEPAM 2 MG/ML
1 CONCENTRATE ORAL EVERY 8 HOURS PRN
Qty: 30 ML | Refills: 0 | Status: SHIPPED | OUTPATIENT
Start: 2023-03-08 | End: 2023-03-22

## 2023-03-08 RX ORDER — MORPHINE SULFATE 20 MG/ML
5 SOLUTION ORAL EVERY 4 HOURS PRN
Status: DISCONTINUED | OUTPATIENT
Start: 2023-03-08 | End: 2023-03-08 | Stop reason: HOSPADM

## 2023-03-08 RX ORDER — LORAZEPAM 2 MG/ML
1 CONCENTRATE ORAL EVERY 8 HOURS PRN
Status: DISCONTINUED | OUTPATIENT
Start: 2023-03-08 | End: 2023-03-08 | Stop reason: HOSPADM

## 2023-03-08 RX ADMIN — PIPERACILLIN AND TAZOBACTAM 3375 MG: 3; .375 INJECTION, POWDER, FOR SOLUTION INTRAVENOUS at 04:06

## 2023-03-08 RX ADMIN — LORAZEPAM 0.5 MG: 2 INJECTION INTRAMUSCULAR; INTRAVENOUS at 04:02

## 2023-03-08 RX ADMIN — SODIUM CHLORIDE: 9 INJECTION, SOLUTION INTRAVENOUS at 06:59

## 2023-03-08 RX ADMIN — LORAZEPAM 0.5 MG: 2 INJECTION INTRAMUSCULAR; INTRAVENOUS at 11:39

## 2023-03-08 ASSESSMENT — PAIN SCALES - GENERAL
PAINLEVEL_OUTOF10: 0
PAINLEVEL_OUTOF10: 0

## 2023-03-08 ASSESSMENT — PAIN SCALES - WONG BAKER
WONGBAKER_NUMERICALRESPONSE: 0
WONGBAKER_NUMERICALRESPONSE: 0

## 2023-03-08 NOTE — PROGRESS NOTES
PALLIATIVE MEDICINE PROGRESS NOTE     Patient name:Eliceo Nolan    YHY:[id number] :1940  Room/Bed:[facility]303/3303-    LOS: 2 days        ASSESSMENT/RECOMMENDATIONS   82 y.o. male with AMS and dysphagia with end stage Alzheimers        Symptom Management:  AMS- pt arouses to painful stimulation only   Dysphagia- pt has not been safe to swallow in several days and is not arousing for SLP  Goals of Care-  [facility]. Pt somnolent today daughter at bedside plan to transfer to [address]. Family has no additional questions. Hospice RN on the unit     Patient/Family Goals of Care :    talked to pts daughter Hildred Speaker at bedside spouse is hoping pt can go to [facility] the goal at this point is pts comfort. Specifics of an end-of-life/comfort-focused treatment plan were discussed with the family, including (but not limited to) discontinuation of labs, non-palliative medications, and routine vitals, along with the benefits of hospice enrollment. Family asked multiple appropriate questions, all of which were addressed. Family is unanimous in transitioning to intensive comfort treatment. Pt is DNRCC at baseline. Referral faxed and called to Hospice for [facility] they will contact family and hopefully can come tomorrow and evaluate pt for IPU. Disposition/Discharge Plan:   pending     Advance Directives:  Code status:  DNR-CC  Decision Maker: Gaye-spouse    Case Discussed with:  patient, floor RN    Thank you for allowing us to participate in the care of this patient. SUBJECTIVE     Chief Complaint: AMS    Last 24 hours:   Pt with increased AMS today planning to DC to [address] today    ROS:    Review of Systems   Unable to perform ROS: Patient nonverbal                 Physical Exam  Constitutional:       Appearance: He is ill-appearing. HENT:      Head: Normocephalic and atraumatic. Nose: Nose normal.      Mouth/Throat:      Mouth: Mucous membranes are dry.    Eyes:      Pupils: Pupils are equal, round, and reactive to light. Cardiovascular:      Rate and Rhythm: Normal rate. Pulses: Normal pulses. Heart sounds: No murmur heard. No gallop. Pulmonary:      Effort: Pulmonary effort is normal. No respiratory distress. Abdominal:      General: Abdomen is flat. Musculoskeletal:      Cervical back: Neck supple. Right lower leg: Edema present. Left lower leg: Edema present. Skin:     General: Skin is dry. Coloration: Skin is pale. Neurological:      Comments: Non-verbal limited responsiveness         Palliative Medicine Interventions:    patient/family support  Goals of Care discussions with patient/surrogate  Spiritual Interventions: none         DATA:  Current labs in the epic chart reviewed as of 3/8/2023   Review of previous notes, admits, labs, radiology and testing relevant to this consult done in this chart today 3/8/2023    Data Reviewed related to this consultation:    Review of prior external note(s) from each unique source relevant to today's visit: Hospitalist, Case management  Discussion of management or test with external physician/qualified health care professional: Hospitalist, Case management  Unique test results reviewed: CBC and BMP      I have spent a total of 35 minutes on: Performing a medical appropriate examination and/or evaluation    Counseling and educating the patient/family/caregiver  Preparing to see the patient (e.g., review of tests)  Referring / communicating with other healthcare professionals including care coordination (not separately reported):  Hospitalist Case management  Documenting clinical information in the electronic health record     Signed By: Electronically signed by SUZANNE Celestin CNP on 3/8/2023 at 12:32 PM   Palliative Medicine     March 8, 2023

## 2023-03-08 NOTE — PROGRESS NOTES
Department of Internal Medicine  General Internal Medicine   Progress Note      SUBJECTIVE:  nonverbal currently lethargic  steady decline in PO intake remains at a high aspiration risk     History obtained from chart review, the patient'sdaughter , and nurses  General ROS: positive for  - fatigue, malaise, sleep disturbance, and weight loss  negative for - chills, fever, or night sweats  Psychological ROS: positive for - anxiety, disorientation, irritability, and memory difficulties  negative for - behavioral disorder, hallucinations, hostility, or mood swings  Ophthalmic ROS: negative  Respiratory ROS: positive for - cough, shortness of breath, tachypnea, and wheezing  negative for - hemoptysis or wheezing  Cardiovascular ROS: positive for - dyspnea on exertion and shortness of breath  negative for - chest pain  Gastrointestinal ROS: no abdominal pain, change in bowel habits, or black or bloody stools  Genito-Urinary ROS: incontinent   Musculoskeletal ROS: chronic pain   Neurological ROS: generalized extensive weakness and lack of co-ordination   Dermatological ROS: negative    OBJECTIVE      Medications      Current Facility-Administered Medications: acetaminophen (TYLENOL) suppository 650 mg, 650 mg, Rectal, Q4H PRN  amLODIPine (NORVASC) tablet 2.5 mg, 2.5 mg, Oral, Nightly  divalproex (DEPAKOTE ER) extended release tablet 250 mg, 250 mg, Oral, Nightly  divalproex (DEPAKOTE SPRINKLE) DR capsule 125 mg, 125 mg, Oral, QAM AC  losartan (COZAAR) tablet 50 mg, 50 mg, Oral, Daily  QUEtiapine (SEROQUEL) tablet 25 mg, 25 mg, Oral, Nightly  0.9 % sodium chloride infusion, , IntraVENous, Continuous  acetaminophen (TYLENOL) tablet 650 mg, 650 mg, Oral, Q4H PRN  [COMPLETED] piperacillin-tazobactam (ZOSYN) 4,500 mg in sodium chloride 0.9 % 100 mL IVPB (mini-bag), 4,500 mg, IntraVENous, Once **FOLLOWED BY** piperacillin-tazobactam (ZOSYN) 3,375 mg in sodium chloride 0.9 % 50 mL IVPB (mini-bag), 3,375 mg, IntraVENous,  Q8H  ipratropium-albuterol (DUONEB) nebulizer solution 1 ampule, 1 ampule, Inhalation, TID  ipratropium-albuterol (DUONEB) nebulizer solution 1 ampule, 1 ampule, Inhalation, Q4H PRN  LORazepam (ATIVAN) tablet 0.5 mg, 0.5 mg, Oral, Q4H PRN  LORazepam (ATIVAN) injection 0.5 mg, 0.5 mg, IntraVENous, Q4H PRN    Physical      Vitals: /61   Pulse 65   Temp 97.7 °F (36.5 °C) (Axillary)   Resp 18   Ht 5' 4\" (1.626 m)   Wt 119 lb 4.8 oz (54.1 kg)   SpO2 100%   BMI 20.48 kg/m²   Temp: Temp: 97.7 °F (36.5 °C)  Max: Temp  Av.6 °F (36.4 °C)  Min: 97.5 °F (36.4 °C)  Max: 97.7 °F (36.5 °C)  Respiration range:  Resp  Av.7  Min: 16  Max: 18  Pulse Range:  Pulse  Av.8  Min: 63  Max: 79  Blood pressure range:  Systolic (86JAZ), NQI:525 , Min:121 , VSF:879   , Diastolic (21FGG), BEM:83, Min:61, Max:81    SpO2  Av.2 %  Min: 94 %  Max: 100 %    Intake/Output Summary (Last 24 hours) at 3/8/2023 0945  Last data filed at 3/8/2023 8760  Gross per 24 hour   Intake 1777.54 ml   Output 1270 ml   Net 507.54 ml       Vent settings:  Pulse  Av.6  Min: 52  Max: 108  Resp  Av.7  Min: 14  Max: 33  SpO2  Av.6 %  Min: 15 %  Max: 100 %    CONSTITUTIONAL:  fatigued, somnolent, uncooperative, distracted, severe distress, appears older than stated age, and thin  EYES:  unremarkable   NECK:  no JVD  and supple, symmetrical, trachea midline  BACK:  symmetric and no curvature  LUNGS:  tachypneic, Moderate respiratory distress, decreased air exchange, no retractions, and crackles diffuse, wheeze diffuse  CARDIOVASCULAR:  normal apical pulses, tachycardic with regular rhythm, normal S1 and S2, no S3, and no S4  MUSCULOSKELETAL:  no edema   NEUROLOGIC:  extensive weakness can not participate in sensory motor eval babinski absent   Mental Status Exam:  Level of Alertness:   somnolent  Orientation:   person, place, time, absent   Memory:   abnormal - total impairment except some remote   SKIN:  warm dry pale  and no bruising or bleeding    Data      Recent Results (from the past 96 hour(s))   EKG 12 Lead    Collection Time: 03/06/23  7:33 AM   Result Value Ref Range    Ventricular Rate 116 BPM    Atrial Rate 116 BPM    P-R Interval 132 ms    QRS Duration 72 ms    Q-T Interval 312 ms    QTc Calculation (Bazett) 433 ms    P Axis 48 degrees    R Axis 13 degrees    T Axis -88 degrees    Diagnosis       Sinus tachycardiaST and T abnormality, consider inferolateral ischemiaConfirmed by BREANNA Brian MD ([id number]) on 3/6/2023 10:27:30 AM   CBC with Auto Differential    Collection Time: 03/06/23  7:58 AM   Result Value Ref Range    WBC 14.2 (H) 4.0 - 11.0 K/uL    RBC 4.66 4.20 - 5.90 M/uL    Hemoglobin 14.2 13.5 - 17.5 g/dL    Hematocrit 43.5 40.5 - 52.5 %    MCV 93.2 80.0 - 100.0 fL    MCH 30.5 26.0 - 34.0 pg    MCHC 32.7 31.0 - 36.0 g/dL    RDW 15.1 12.4 - 15.4 %    Platelets 907 583 - 687 K/uL    MPV 7.6 5.0 - 10.5 fL    Neutrophils % 92.2 %    Lymphocytes % 4.7 %    Monocytes % 2.6 %    Eosinophils % 0.1 %    Basophils % 0.4 %    Neutrophils Absolute 13.1 (H) 1.7 - 7.7 K/uL    Lymphocytes Absolute 0.7 (L) 1.0 - 5.1 K/uL    Monocytes Absolute 0.4 0.0 - 1.3 K/uL    Eosinophils Absolute 0.0 0.0 - 0.6 K/uL    Basophils Absolute 0.1 0.0 - 0.2 K/uL   CMP w/ Reflex to MG    Collection Time: 03/06/23  7:58 AM   Result Value Ref Range    Sodium 143 136 - 145 mmol/L    Potassium reflex Magnesium 5.6 (H) 3.5 - 5.1 mmol/L    Chloride 104 99 - 110 mmol/L    CO2 25 21 - 32 mmol/L    Anion Gap 14 3 - 16    Glucose 161 (H) 70 - 99 mg/dL    BUN 39 (H) 7 - 20 mg/dL    Creatinine 1.5 (H) 0.8 - 1.3 mg/dL    Est, Glom Filt Rate 46 (A) >60    Calcium 9.5 8.3 - 10.6 mg/dL    Total Protein 7.7 6.4 - 8.2 g/dL    Albumin 3.3 (L) 3.4 - 5.0 g/dL    Albumin/Globulin Ratio 0.8 (L) 1.1 - 2.2    Total Bilirubin 0.8 0.0 - 1.0 mg/dL    Alkaline Phosphatase 96 40 - 129 U/L    ALT 51 (H) 10 - 40 U/L    AST 46 (H) 15 - 37 U/L   Lactate, Sepsis    Collection Time: 03/06/23  7:58 AM   Result Value Ref Range    Lactic Acid, Sepsis 1.9 0.4 - 1.9 mmol/L   Blood Gas, Venous    Collection Time: 03/06/23  7:58 AM   Result Value Ref Range    pH, Galo 7.366 7.350 - 7.450    pCO2, Galo 45.5 40.0 - 50.0 mmHg    pO2, Galo 79.9 (H) 25.0 - 40.0 mmHg    HCO3, Venous 26.0 23.0 - 29.0 mmol/L    Base Excess, Galo 0.2 -3.0 - 3.0 mmol/L    O2 Sat, Galo 95 Not Established %    Carboxyhemoglobin 4.1 (H) 0.0 - 1.5 %    MetHgb, Galo 0.0 <1.5 %    TC02 (Calc), Galo 62 Not Established mmol/L    O2 Content, Galo 20 Not Established VOL %    O2 Therapy Unknown     Hemoglobin, Galo, Reduced 5 %   Troponin    Collection Time: 03/06/23  7:58 AM   Result Value Ref Range    Troponin 0.19 (H) <0.01 ng/mL   BNP    Collection Time: 03/06/23  7:58 AM   Result Value Ref Range    Pro-BNP 1,096 (H) 0 - 449 pg/mL   COVID-19, Rapid    Collection Time: 03/06/23  7:58 AM    Specimen: Nasopharyngeal Swab   Result Value Ref Range    SARS-CoV-2, NAAT Not Detected Not Detected   Rapid influenza A/B antigens    Collection Time: 03/06/23  7:58 AM    Specimen: Nasopharyngeal   Result Value Ref Range    Rapid Influenza A Ag Negative Negative    Rapid Influenza B Ag Negative Negative   Urinalysis with Reflex to Culture    Collection Time: 03/06/23  8:40 AM    Specimen: Urine   Result Value Ref Range    Color, UA Yellow Straw/Yellow    Clarity, UA Clear Clear    Glucose, Ur Negative Negative mg/dL    Bilirubin Urine Negative Negative    Ketones, Urine 15 (A) Negative mg/dL    Specific Gravity, UA 1.020 1.005 - 1.030    Blood, Urine Negative Negative    pH, UA 5.0 5.0 - 8.0    Protein, UA 30 (A) Negative mg/dL    Urobilinogen, Urine 1.0 <2.0 E.U./dL    Nitrite, Urine Negative Negative    Leukocyte Esterase, Urine Negative Negative    Microscopic Examination YES     Urine Type NotGiven     Urine Reflex to Culture Not Indicated    Microscopic Urinalysis    Collection Time: 03/06/23  8:40 AM   Result Value Ref Range    Bacteria, UA None Seen None Seen /HPF    Hyaline Casts, UA 4 0 - 8 /LPF    WBC, UA 5 0 - 5 /HPF    RBC, UA 2 0 - 4 /HPF    Epithelial Cells, UA 1 0 - 5 /HPF   Blood Culture 1    Collection Time: 03/06/23  8:58 AM    Specimen: Blood   Result Value Ref Range    Blood Culture, Routine       No Growth to date. Any change in status will be called. Blood Culture 2    Collection Time: 03/06/23  8:58 AM    Specimen: Blood   Result Value Ref Range    Culture, Blood 2       No Growth to date. Any change in status will be called.    Lactate, Sepsis    Collection Time: 03/06/23  8:58 AM   Result Value Ref Range    Lactic Acid, Sepsis 2.4 (H) 0.4 - 1.9 mmol/L   POCT Glucose    Collection Time: 03/06/23  3:17 PM   Result Value Ref Range    POC Glucose 167 (H) 70 - 99 mg/dl    Performed on ACCU-CHEK    POCT Glucose    Collection Time: 03/06/23  4:27 PM   Result Value Ref Range    POC Glucose 140 (H) 70 - 99 mg/dl    Performed on ACCU-CHEK    POCT Glucose    Collection Time: 03/06/23  8:38 PM   Result Value Ref Range    POC Glucose 196 (H) 70 - 99 mg/dl    Performed on ACCU-CHEK    Basic Metabolic Panel    Collection Time: 03/07/23  5:52 AM   Result Value Ref Range    Sodium 146 (H) 136 - 145 mmol/L    Potassium 5.0 3.5 - 5.1 mmol/L    Chloride 113 (H) 99 - 110 mmol/L    CO2 24 21 - 32 mmol/L    Anion Gap 9 3 - 16    Glucose 154 (H) 70 - 99 mg/dL    BUN 44 (H) 7 - 20 mg/dL    Creatinine 1.8 (H) 0.8 - 1.3 mg/dL    Est, Glom Filt Rate 37 (A) >60    Calcium 8.6 8.3 - 10.6 mg/dL   CBC with Auto Differential    Collection Time: 03/07/23  5:52 AM   Result Value Ref Range    WBC 11.5 (H) 4.0 - 11.0 K/uL    RBC 3.79 (L) 4.20 - 5.90 M/uL    Hemoglobin 11.6 (L) 13.5 - 17.5 g/dL    Hematocrit 35.2 (L) 40.5 - 52.5 %    MCV 93.0 80.0 - 100.0 fL    MCH 30.6 26.0 - 34.0 pg    MCHC 32.9 31.0 - 36.0 g/dL    RDW 15.3 12.4 - 15.4 %    Platelets 981 830 - 243 K/uL    MPV 7.3 5.0 - 10.5 fL    Neutrophils % 91.5 %    Lymphocytes % 5.4 %    Monocytes % 2.6 %    Eosinophils % 0.1 %    Basophils % 0.4 %    Neutrophils Absolute 10.5 (H) 1.7 - 7.7 K/uL    Lymphocytes Absolute 0.6 (L) 1.0 - 5.1 K/uL    Monocytes Absolute 0.3 0.0 - 1.3 K/uL    Eosinophils Absolute 0.0 0.0 - 0.6 K/uL    Basophils Absolute 0.1 0.0 - 0.2 K/uL   POCT Glucose    Collection Time: 03/07/23  7:35 AM   Result Value Ref Range    POC Glucose 138 (H) 70 - 99 mg/dl    Performed on ACCU-CHEK    POCT Glucose    Collection Time: 03/07/23 11:39 AM   Result Value Ref Range    POC Glucose 139 (H) 70 - 99 mg/dl    Performed on ACCU-CHEK    POCT Glucose    Collection Time: 03/07/23  4:32 PM   Result Value Ref Range    POC Glucose 146 (H) 70 - 99 mg/dl    Performed on [address] [place] [address] Problems             Last Modified POA    * (Principal) Acute respiratory failure ([address] 3/6/2023 Yes    Alzheimer's disease ([address] 3/7/2023 Yes    Hypertension 3/7/2023 Yes    Type 2 diabetes mellitus ([address] 3/7/2023 Yes    Altered mental status, unspecified 3/7/2023 Yes    Aspiration pneumonia ([address] 3/7/2023 Yes     IV antibiotics  Nebulizer and O2    Daughter who is an RN present in the room    She is totally on board with Palliative care and hospice consult

## 2023-03-08 NOTE — CARE COORDINATION
SW informed pt will be discharging today at 12:30pm with [facility] in [place], [place]. All needs met per case management.     Electronically signed by WILIAM Lew, BETITO on 3/8/2023 at 11:53 AM

## 2023-03-08 NOTE — PROGRESS NOTES
Shift assessment completed. Patient very lethargic. VSS with exception of bradycardia at rest. Daughter in room with patient. Daughter Analisa Maciel refused AM labs. Patient discharging to [facility]. Call light in place. Bed in lowest position. Bed alarm on. Care continues.

## 2023-03-08 NOTE — PLAN OF CARE
Problem: Skin/Tissue Integrity  Goal: Absence of new skin breakdown  Description: 1. Monitor for areas of redness and/or skin breakdown  2. Assess vascular access sites hourly  3. Every 4-6 hours minimum:  Change oxygen saturation probe site  4. Every 4-6 hours:  If on nasal continuous positive airway pressure, respiratory therapy assess nares and determine need for appliance change or resting period.   Outcome: Progressing     Problem: Discharge Planning  Goal: Discharge to home or other facility with appropriate resources  Outcome: Progressing     Problem: Pain  Goal: Verbalizes/displays adequate comfort level or baseline comfort level  Outcome: Progressing     Problem: Safety - Adult  Goal: Free from fall injury  Outcome: Progressing     Problem: Chronic Conditions and Co-morbidities  Goal: Patient's chronic conditions and co-morbidity symptoms are monitored and maintained or improved  Outcome: Progressing     Problem: Nutrition Deficit:  Goal: Optimize nutritional status  Outcome: Progressing

## 2023-03-08 NOTE — PROGRESS NOTES
Patients head to toe assessment completed. Vital signs WNL. Bed alarm engaged, call light within reach. Scheduled medications given per MAR. Patient congested at this time, mouth suctioned. Patient does not seem to be in distress. Patient resting in bed. Will continue to monitor.

## 2023-03-08 NOTE — DISCHARGE INSTR - COC
Continuity of Care Form    Patient Name: Ganesh Davila   :  1940  MRN:  [id number]    Admit date:  3/6/2023  Discharge date:  2023    Code Status Order: DNR-CC   Advance Directives:     Admitting Physician:  Emma Medina MD  PCP: Angeol Kirk    Discharging Nurse: [facility] Unit/Room#: [id number]  Discharging Unit Phone Number: [phone]    Emergency Contact:   Extended Emergency Contact Information  Primary Emergency Contact: Kristen Marcelino Phone: [phone]  Relation: Spouse  Secondary Emergency Contact: Yosef herrera  Home Phone: [phone]  Mobile Phone: [phone]  Relation: Child    Past Surgical History:  Past Surgical History:   Procedure Laterality Date    OTHER SURGICAL HISTORY      cardiac bypass graft       Immunization History: There is no immunization history on file for this patient.     Active Problems:  Patient Active Problem List   Diagnosis Code    Dementia with behavioral disturbance F03.918    Acute respiratory failure with hypoxia ([facility]) J96.01    Aspiration into airway T17.908A    Alzheimer's disease (HCC) G30.9, F02.80    Hypertension I10    Type 2 diabetes mellitus ([facility] E11.9    Altered mental status, unspecified R41.82    Aspiration pneumonia ([facility] J69.0    Acute respiratory failure ([facility]) J96.00       Isolation/Infection:   Isolation            No Isolation          Patient Infection Status       Infection Onset Added Last Indicated Last Indicated By Review Planned Expiration Resolved Resolved By    None active    Resolved    COVID-19 (Rule Out) 23 COVID-19, Rapid (Ordered)   23 Rule-Out Test Resulted    COVID-19 (Rule Out) 23 COVID-19 & Influenza Combo (Ordered)   23 Rule-Out Test Resulted            Nurse Assessment:  Last Vital Signs: /61   Pulse 65   Temp 97.7 °F (36.5 °C) (Axillary)   Resp 18   Ht 5' 4\" (1.626 m)   Wt 119 lb 4.8 oz (54.1 kg)   SpO2 100% BMI 20.48 kg/m²     Last documented pain score (0-10 scale): Pain Level: 0  Last Weight:   Wt Readings from Last 1 Encounters:   03/08/23 119 lb 4.8 oz (54.1 kg)     Mental Status:   lethergic    IV Access:  - None    Nursing Mobility/ADLs:  Walking   Dependent  Transfer  Dependent  Bathing  Dependent  Dressing  Dependent  Toileting  Dependent  Feeding  Dependent  Med Admin  Dependent  Med Delivery    NPO    Wound Care Documentation and Therapy:        Elimination:  Continence: Bowel: No  Bladder: No  Urinary Catheter: Insertion Date: 03/06/2023    Colostomy/Ileostomy/Ileal Conduit: No       Date of Last BM: 3/1/2022    Intake/Output Summary (Last 24 hours) at 3/8/2023 1055  Last data filed at 3/8/2023 9721  Gross per 24 hour   Intake 1689.99 ml   Output 1270 ml   Net 419.99 ml     I/O last 3 completed shifts: In: 3739.9 [I.V.:2610.6; IV Piggyback:1129.3]  Out: 1720 [Urine:1720]    Safety Concerns:     Aspiration Risk    Impairments/Disabilities:      Unable to assess    Nutrition Therapy:  Current Nutrition Therapy:   - NPO    Routes of Feeding: None  Liquids: NPO  Daily Fluid Restriction: no  Last Modified Barium Swallow with Video (Video Swallowing Test): not done    Treatments at the Time of Hospital Discharge:   Respiratory Treatments: duoneb  Oxygen Therapy:  is on oxygen at 3 L/min per nasal cannula.   Ventilator:    - No ventilator support    Rehab Therapies: n/a  Weight Bearing Status/Restrictions: No weight bearing restrictions  Other Medical Equipment (for information only, NOT a DME order):  hospital bed  Other Treatments: n/a    Patient's personal belongings (please select all that are sent with patient):  Dentures upper and lower    RN SIGNATURE:  Electronically signed by Violet Magallanes RN on 3/8/23 at 11:18 AM EST    CASE MANAGEMENT/SOCIAL WORK SECTION    Inpatient Status Date: ***    Readmission Risk Assessment Score:  Readmission Risk              Risk of Unplanned Readmission:  20 Discharging to Facility/ Agency   Name:   Address:  Phone:  Fax:    Dialysis Facility (if applicable)   Name:  Address:  Dialysis Schedule:  Phone:  Fax:    / signature: {Esignature:[id number]}    PHYSICIAN SECTION    Prognosis: Poor    Condition at Discharge: Stable    Rehab Potential (if transferring to Rehab): Poor    Recommended Labs or Other Treatments After Discharge: terminal comfort care     Physician Certification: I certify the above information and transfer of Westley Lomas  is necessary for the continuing treatment of the diagnosis listed and that he requires Hospice for less 30 days.      Update Admission H&P: No change in H&P    PHYSICIAN SIGNATURE:  Electronically signed by Angeles Sanabria MD on 3/8/23 at 10:56 AM EST

## 2023-03-08 NOTE — PLAN OF CARE
Problem: Skin/Tissue Integrity  Goal: Absence of new skin breakdown  Description: 1. Monitor for areas of redness and/or skin breakdown  2. Assess vascular access sites hourly  3. Every 4-6 hours minimum:  Change oxygen saturation probe site  4. Every 4-6 hours:  If on nasal continuous positive airway pressure, respiratory therapy assess nares and determine need for appliance change or resting period.   3/8/2023 1147 by Maple Blizzard, RN  Outcome: Completed  3/8/2023 0053 by Lashawn Parada RN  Outcome: Progressing

## 2023-03-08 NOTE — PROGRESS NOTES
Patient very restless and confused, keeps pulling on lines and trying to get up from bed. PRN ativan given. Patient repositioned in bed. Unable to get a camera in patient room.

## 2023-03-10 LAB
BLOOD CULTURE, ROUTINE: NORMAL
CULTURE, BLOOD 2: NORMAL

## 2024-03-29 NOTE — PROGRESS NOTES
Physical Therapy/Occupational Therapy    Order received and chart reviewed. Per RN, pt very agitated and combative at this time. Hold therapy eval. Will follow-up as PT/OT schedule and pt status permit.    No Charge.    Bela Bowden, PT, DPT, OMT-C # [id number]  Kelley Haynes, OTR/L [id number]       rest